# Patient Record
Sex: MALE | Race: WHITE | NOT HISPANIC OR LATINO | Employment: FULL TIME | URBAN - METROPOLITAN AREA
[De-identification: names, ages, dates, MRNs, and addresses within clinical notes are randomized per-mention and may not be internally consistent; named-entity substitution may affect disease eponyms.]

---

## 2017-03-01 RX ORDER — INDOMETHACIN 50 MG/1
50 CAPSULE ORAL 3 TIMES DAILY
Qty: 45 CAP | Refills: 1 | Status: SHIPPED | OUTPATIENT
Start: 2017-03-01 | End: 2017-05-23 | Stop reason: SDUPTHER

## 2017-03-02 NOTE — TELEPHONE ENCOUNTER
Requested Prescriptions     Pending Prescriptions Disp Refills   • indomethacin (INDOCIN) 50 MG Cap 45 Cap 1     Sig: Take 1 Cap by mouth 3 times a day. For gout, stop when symptoms decrease  Indications: Acute Joint Inflammation in Gout     DOE Finnegan.

## 2017-03-30 ENCOUNTER — OFFICE VISIT (OUTPATIENT)
Dept: MEDICAL GROUP | Facility: PHYSICIAN GROUP | Age: 52
End: 2017-03-30
Payer: COMMERCIAL

## 2017-03-30 ENCOUNTER — HOSPITAL ENCOUNTER (OUTPATIENT)
Dept: LAB | Facility: MEDICAL CENTER | Age: 52
End: 2017-03-30
Attending: NURSE PRACTITIONER
Payer: COMMERCIAL

## 2017-03-30 VITALS
TEMPERATURE: 97.9 F | HEIGHT: 72 IN | SYSTOLIC BLOOD PRESSURE: 136 MMHG | DIASTOLIC BLOOD PRESSURE: 88 MMHG | HEART RATE: 76 BPM | WEIGHT: 315 LBS | BODY MASS INDEX: 42.66 KG/M2 | OXYGEN SATURATION: 94 % | RESPIRATION RATE: 14 BRPM

## 2017-03-30 DIAGNOSIS — M1A.0720 IDIOPATHIC CHRONIC GOUT OF LEFT FOOT WITHOUT TOPHUS: ICD-10-CM

## 2017-03-30 DIAGNOSIS — E11.9 TYPE 2 DIABETES MELLITUS WITHOUT COMPLICATION, WITHOUT LONG-TERM CURRENT USE OF INSULIN (HCC): ICD-10-CM

## 2017-03-30 LAB
HBA1C MFR BLD: 7.9 % (ref ?–5.8)
INT CON NEG: NEGATIVE
INT CON POS: POSITIVE
URATE SERPL-MCNC: 4.4 MG/DL (ref 2.5–8.3)

## 2017-03-30 PROCEDURE — 99214 OFFICE O/P EST MOD 30 MIN: CPT | Performed by: NURSE PRACTITIONER

## 2017-03-30 PROCEDURE — 36415 COLL VENOUS BLD VENIPUNCTURE: CPT

## 2017-03-30 PROCEDURE — 84550 ASSAY OF BLOOD/URIC ACID: CPT

## 2017-03-30 PROCEDURE — 83036 HEMOGLOBIN GLYCOSYLATED A1C: CPT | Performed by: NURSE PRACTITIONER

## 2017-03-30 NOTE — ASSESSMENT & PLAN NOTE
Patient is currently working nights and this is making it difficult for him to manage sleep and food. He is taking his medications and reports almost 100% compliance. Today's A1c in the clinic is 7.9.

## 2017-03-30 NOTE — ASSESSMENT & PLAN NOTE
Having flares currently.  Taking indomethacin frequently.  Allopurinol daily.  No recent uric acid level.

## 2017-03-30 NOTE — ASSESSMENT & PLAN NOTE
Patient is down 4 pounds from our last visit in December. Patient is now working full-time nights and this is making it difficult to sleep and eat regular.

## 2017-03-30 NOTE — PROGRESS NOTES
Chief Complaint   Patient presents with   • Follow-Up   • Medication Refill       Subjective:   Moe Car is a 51 y.o. white male here today for evaluation and management of:    Chronic idiopathic gout of left foot  Having flares currently.  Taking indomethacin frequently.  Allopurinol daily.  No recent uric acid level.     Body mass index (BMI) of 45.0-49.9 in adult  Patient is down 4 pounds from our last visit in December. Patient is now working full-time nights and this is making it difficult to sleep and eat regular.    Type 2 diabetes mellitus without complication (CMS-HCC)  Patient is currently working nights and this is making it difficult for him to manage sleep and food. He is taking his medications and reports almost 100% compliance. Today's A1c in the clinic is 7.9.         Current medicines (including changes today)  Current Outpatient Prescriptions   Medication Sig Dispense Refill   • Empagliflozin 25 MG Tab Take 25 mg by mouth every day. 21 Tab 0   • Empagliflozin 25 MG Tab Take 25 mg by mouth every day. 90 Tab 2   • allopurinol (ZYLOPRIM) 300 MG Tab Take 1 Tab by mouth every day. 30 Tab 3   • Telmisartan-Amlodipine 40-10 MG Tab TAKE 1 TABLET EVERY DAY FOR BLOOD PRESSURE 90 Tab 3   • pravastatin (PRAVACHOL) 80 MG tablet TAKE 1 TAB BY MOUTH EVERY DAY. 90 Tab 3   • metformin (GLUCOPHAGE) 1000 MG tablet Take 1 Tab by mouth 2 times a day, with meals. 40 Tab 1   • hydrochlorothiazide (MICROZIDE) 12.5 MG capsule Take 1 Cap by mouth every day. 90 Cap 3   • metoprolol (TOPROL-XL) 200 MG XL tablet Take 1 Tab by mouth every day. TAKE 1 TABLET DAILY FOR HIGH BLOOD PRESSURE 90 Tab 3   • Cholecalciferol (VITAMIN D-3) 5000 UNITS TABS Take  by mouth.     • Coenzyme Q10 (CO Q 10 PO) Take  by mouth.     • indomethacin (INDOCIN) 50 MG Cap Take 1 Cap by mouth 3 times a day. For gout, stop when symptoms decrease  Indications: Acute Joint Inflammation in Gout 45 Cap 1   • pravastatin (PRAVACHOL) 80 MG tablet Take  1 Tab by mouth every day. 90 Tab 3   • Telmisartan-Amlodipine 40-10 MG Tab Take 1 Tab by mouth every day. TAKE 1 TABLET DAILY FOR HIGH BLOOD PRESSURE 90 Tab 3   • glucose blood (FREESTYLE INSULINX TEST) strip 1 Strip by Other route as needed. 100 Strip 3   • acyclovir (ZOVIRAX) 5 % OINT Apply  to affected area(s) every 3 hours. Apply after cleansing 60 g 3   • Blood Glucose Monitoring Suppl (FREESTYLE FREEDOM LITE) W/DEVICE KIT by Does not apply route. Test QD 1 Each prn   • glucose blood VI test strips (FREESTYLE LITE) strip by In Vitro route as needed. Test  Strip 3     No current facility-administered medications for this visit.     He  has a past medical history of Hypertension (10/21/2009); Hyperlipidemia (10/21/2009); and Obstructive sleep apnea (12/2/2009).    ROS as stated in history of present illness.  No chest pain, no shortness of breath, no abdominal pain       Objective:     Blood pressure 136/88, pulse 76, temperature 36.6 °C (97.9 °F), resp. rate 14, height 1.829 m (6'), weight 161.481 kg (356 lb), SpO2 94 %. Body mass index is 48.27 kg/(m^2). down 4 pounds from last visit.  Physical Exam:  Constitutional: Alert, no distress.  Skin: Warm, dry, good turgor,no cyanosis, no rashes in visible areas.  Eye: Equal, round and reactive, conjunctiva clear, lids normal.  Ears: No tenderness, no discharge.  External canals are without any significant edema or erythema.  Gross auditory acuity is intact.  Nose: symmetrical without tenderness, no discharge.  Mouth/Throat: lips without lesion.  Oropharynx clear.    Neck: Trachea midline, no masses, no obvious thyroid enlargement.. . Range of motion within normal limits.  Neuro: Cranial nerves 2-12 grossly intact.  No sensory deficit.  Respiratory: Unlabored respiratory effort, lungs clear to auscultation, no wheezes, no ronchi.  Cardiovascular: Normal S1, S2, no murmur, no edema.  Abdomen: Soft, morbidly obese non-tender, no masses, no guarding,  no  hepatosplenomegaly.  Monofilament testing with a 10 gram force: sensation intact: decreased on left  Visual Inspection: Feet without maceration, ulcers, fissures.  Pedal pulses: intact bilaterally  Psych: Alert and oriented x3, normal affect and mood and judgement.        Assessment and Plan:   The following treatment plan was discussed    1. Type 2 diabetes mellitus without complication, without long-term current use of insulin (CMS-HCC)  Chronic. Ongoing. Hemoglobin A1c today was 7.9 which is up from 6.8. We will add chart he has 25 mg by mouth daily. And recheck A1c in 3 months. We discussed continued efforts with his diet and exercise. He is down 4 pounds.  - POCT  A1C  - MICROALBUMIN CREAT RATIO URINE; Future  - COMP METABOLIC PANEL; Future  - LIPID PROFILE; Future  - HEMOGLOBIN A1C; Future    2. Idiopathic chronic gout of left foot without tophus  Chronic. Ongoing. Patient has had recent flares where he has had to take the I indomethacin daily. Patient states that over the last 6 weeks this has improved. We will check a uric acid level and monitor. Continue with allopurinol 300 mg daily. Discussed diet including red meat, nitrates, beer.  - URIC ACID; Future    3. Body mass index (BMI) of 45.0-49.9 in adult (CMS-HCC)  Chronic. Ongoing. Patient is down 4 pounds. We discussed the importance of keeping his gout under control in terms of helping him with walking and using a treadmill. Patient's erratic work schedule and working nights makes this a very difficult and challenging problem for him. We will recheck in 3 months.      Followup: Return in about 3 months (around 6/30/2017) for Diabetes, weight management.

## 2017-03-30 NOTE — MR AVS SNAPSHOT
Moe Car   3/30/2017 7:20 AM   Office Visit   MRN: [id number]    Department:  [facility]   Dept Phone:  [phone]    Description:  Male : 1965   Provider:  PATTI Finnegan           Reason for Visit     Follow-Up     Medication Refill           Allergies as of 3/30/2017     Allergen Noted Reactions    Crestor [Rosuvastatin Calcium] 10/21/2009       Muscle aches       You were diagnosed with     Type 2 diabetes mellitus without complication, without long-term current use of insulin (CMS-HCC)   [1293033]       Idiopathic chronic gout of left foot without tophus   [7735768]         Vital Signs     Blood Pressure Pulse Temperature Respirations Height Weight    136/88 mmHg 76 36.6 °C (97.9 °F) 14 1.829 m (6') 161.481 kg (356 lb)    Body Mass Index Oxygen Saturation Smoking Status             48.27 kg/m2 94% Never Smoker          Basic Information     Date Of Birth Sex Race Ethnicity Preferred Language    1965 Male White Non- English      Your appointments     2017  7:20 AM   Established Patient with PATTI Finnegan   [facility] ([place])    [address] [place] [address] [phone]           You will be receiving a confirmation call a few days before your appointment from our automated call confirmation system.              Problem List              ICD-10-CM Priority Class Noted - Resolved    Hyperlipidemia E78.5   10/21/2009 - Present    Obstructive sleep apnea G47.33   2009 - Present    Hyperglycemia R73.9   1/15/2010 - Present    Hypertension I10   3/2/2011 - Present    Vitamin D deficiency E55.9   2011 - Present    Body mass index (BMI) of 45.0-49.9 in adult (CMS-HCC) Z68.42   2012 - Present    Type 2 diabetes mellitus without complication (CMS-HCC) E11.9   10/13/2016 - Present    Chronic idiopathic gout of left foot M1A.0720   2016 - Present      Health Maintenance        Date Due Completion Dates    COLON CANCER SCREENING ANNUAL FIT 1965 ---    IMM HEP B VACCINE (1 of 3 - Primary Series) 1965 ---    DIABETES MONOFILAMENT / LE EXAM 1/7/1965 ---    RETINAL SCREENING 7/7/1982 ---    IMM PNEUMOCOCCAL 19-64 (ADULT) MEDIUM RISK SERIES (1 of 1 - PPSV23) 7/7/1983 ---    URINE ACR / MICROALBUMIN 5/27/2012 5/27/2011    A1C SCREENING 6/19/2017 12/19/2016, 10/8/2016, 5/6/2015, 11/18/2014, 3/8/2012, 10/14/2011, 7/29/2011, 3/17/2010    FASTING LIPID PROFILE 10/8/2017 10/8/2016, 12/17/2015, 5/6/2015, 11/18/2014, 9/10/2014, 3/11/2014, 6/27/2013, 5/2/2013, 7/2/2012, 3/8/2012, 10/14/2011, 7/29/2011, 5/27/2011, 3/2/2011, 3/17/2010, 10/8/2009, 3/26/2009, 11/7/2008, 7/3/2008, 5/15/2008, 6/7/2007, 3/29/2007, 7/19/2006    SERUM CREATININE 10/8/2017 10/8/2016, 12/17/2015, 5/6/2015, 11/18/2014, 9/10/2014, 3/11/2014, 6/27/2013, 5/2/2013, 7/2/2012, 3/8/2012, 10/14/2011, 7/29/2011, 5/27/2011, 3/2/2011, 3/17/2010, 10/8/2009, 3/26/2009, 11/7/2008, 7/3/2008, 5/15/2008, 6/7/2007, 3/29/2007, 7/19/2006    IMM DTaP/Tdap/Td Vaccine (2 - Td) 3/2/2021 3/2/2011            Results     POCT  A1C      Component    Glycohemoglobin    7.9    Internal Control Negative    Negative    Internal Control Positive    Positive                        Current Immunizations     Influenza Vaccine Pediatric 3/2/2011    Influenza Vaccine Quad Inj (Pf) 12/19/2016    Influenza Vaccine Quad Inj (Preserved) 11/18/2015, 11/18/2014    Tdap Vaccine 3/2/2011      Below and/or attached are the medications your provider expects you to take. Review all of your home medications and newly ordered medications with your provider and/or pharmacist. Follow medication instructions as directed by your provider and/or pharmacist. Please keep your medication list with you and share with your provider. Update the information when medications are discontinued, doses are changed, or new medications (including over-the-counter products) are added; and carry medication information at  all times in the event of emergency situations     Allergies:  CRESTOR - (reactions not documented)               Medications  Valid as of: March 30, 2017 -  7:50 AM    Generic Name Brand Name Tablet Size Instructions for use    Acyclovir (Ointment) ZOVIRAX 5 % Apply  to affected area(s) every 3 hours. Apply after cleansing        Allopurinol (Tab) ZYLOPRIM 300 MG Take 1 Tab by mouth every day.        Blood Glucose Monitoring Suppl (Kit) FREESTYLE FREEDOM LITE W/DEVICE by Does not apply route. Test QD        Cholecalciferol (Tab) Vitamin D-3 5000 UNITS Take  by mouth.        Coenzyme Q10   Take  by mouth.        Empagliflozin (Tab) Empagliflozin 25 MG Take 25 mg by mouth every day.        Empagliflozin (Tab) Empagliflozin 25 MG Take 25 mg by mouth every day.        Glucose Blood (Strip) glucose blood  by In Vitro route as needed. Test QD        Glucose Blood (Strip) glucose blood  1 Strip by Other route as needed.        HydroCHLOROthiazide (Cap) MICROZIDE 12.5 MG Take 1 Cap by mouth every day.        Indomethacin (Cap) INDOCIN 50 MG Take 1 Cap by mouth 3 times a day. For gout, stop when symptoms decrease  Indications: Acute Joint Inflammation in Gout        MetFORMIN HCl (Tab) GLUCOPHAGE 1000 MG Take 1 Tab by mouth 2 times a day, with meals.        Metoprolol Succinate (TABLET SR 24 HR) TOPROL- MG Take 1 Tab by mouth every day. TAKE 1 TABLET DAILY FOR HIGH BLOOD PRESSURE        Pravastatin Sodium (Tab) PRAVACHOL 80 MG Take 1 Tab by mouth every day.        Pravastatin Sodium (Tab) PRAVACHOL 80 MG TAKE 1 TAB BY MOUTH EVERY DAY.        Telmisartan-Amlodipine (Tab) Telmisartan-Amlodipine 40-10 MG Take 1 Tab by mouth every day. TAKE 1 TABLET DAILY FOR HIGH BLOOD PRESSURE        Telmisartan-Amlodipine (Tab) Telmisartan-Amlodipine 40-10 MG TAKE 1 TABLET EVERY DAY FOR BLOOD PRESSURE        .                 Medicines prescribed today were sent to:     [facility] - [facility] [place]  [address] [place] [address]  ROAD    [address] [facility] [address]    Phone: [phone] Fax: [phone]    Open 24 Hours?: No    [id number] [place] [place] [facility] [address] [place] [address] [address] [place] [address]    Phone: [phone] Fax: [phone]    Open 24 Hours?: No      Medication refill instructions:       If your prescription bottle indicates you have medication refills left, it is not necessary to call your provider’s office. Please contact your pharmacy and they will refill your medication.    If your prescription bottle indicates you do not have any refills left, you may request refills at any time through one of the following ways: The online [employer] system (except Urgent Care), by calling your provider’s office, or by asking your pharmacy to contact your provider’s office with a refill request. Medication refills are processed only during regular business hours and may not be available until the next business day. Your provider may request additional information or to have a follow-up visit with you prior to refilling your medication.   *Please Note: Medication refills are assigned a new Rx number when refilled electronically. Your pharmacy may indicate that no refills were authorized even though a new prescription for the same medication is available at the pharmacy. Please request the medicine by name with the pharmacy before contacting your provider for a refill.        Your To Do List     Future Labs/Procedures Complete By Expires    COMP METABOLIC PANEL  As directed 3/31/2018    HEMOGLOBIN A1C  As directed 3/31/2018    LIPID PROFILE  As directed 3/31/2018    MICROALBUMIN CREAT RATIO URINE  As directed 3/31/2018    URIC ACID  As directed 3/31/2018         [employer] Status: Patient Declined

## 2017-03-31 ENCOUNTER — TELEPHONE (OUTPATIENT)
Dept: MEDICAL GROUP | Facility: PHYSICIAN GROUP | Age: 52
End: 2017-03-31

## 2017-03-31 NOTE — TELEPHONE ENCOUNTER
----- Message from PATTI Finnegan sent at 3/30/2017  5:05 PM PDT -----  Please let Moe know that his Uric Acid level was in the normal range at 4.4.  Continue to take the Allopurinol daily.   Take care.  PATTI Finnegan

## 2017-04-06 RX ORDER — ALLOPURINOL 300 MG/1
300 TABLET ORAL DAILY
Qty: 90 TAB | Refills: 3 | Status: SHIPPED | OUTPATIENT
Start: 2017-04-06 | End: 2018-03-07 | Stop reason: SDUPTHER

## 2017-04-06 NOTE — TELEPHONE ENCOUNTER
Requested Prescriptions     Signed Prescriptions Disp Refills   • allopurinol (ZYLOPRIM) 300 MG Tab 90 Tab 3     Sig: Take 1 Tab by mouth every day.     Authorizing Provider: REGULO SLAUGHTER A.P.R.N.

## 2017-05-24 RX ORDER — INDOMETHACIN 50 MG/1
50 CAPSULE ORAL 3 TIMES DAILY
Qty: 270 CAP | Refills: 1 | Status: SHIPPED | OUTPATIENT
Start: 2017-05-24 | End: 2021-08-12 | Stop reason: SDUPTHER

## 2017-05-24 NOTE — TELEPHONE ENCOUNTER
Requested Prescriptions     Signed Prescriptions Disp Refills   • indomethacin (INDOCIN) 50 MG Cap 270 Cap 1     Sig: Take 1 Cap by mouth 3 times a day. For gout, stop when symptoms decrease  Indications: Acute Joint Inflammation in Gout     Authorizing Provider: REGULO SLAUGHTER A.P.R.N.

## 2017-07-18 ENCOUNTER — HOSPITAL ENCOUNTER (OUTPATIENT)
Dept: LAB | Facility: MEDICAL CENTER | Age: 52
End: 2017-07-18
Attending: NURSE PRACTITIONER
Payer: COMMERCIAL

## 2017-07-18 DIAGNOSIS — E11.9 TYPE 2 DIABETES MELLITUS WITHOUT COMPLICATION, WITHOUT LONG-TERM CURRENT USE OF INSULIN (HCC): ICD-10-CM

## 2017-07-18 LAB
ALBUMIN SERPL BCP-MCNC: 4.2 G/DL (ref 3.2–4.9)
ALBUMIN/GLOB SERPL: 1.5 G/DL
ALP SERPL-CCNC: 76 U/L (ref 30–99)
ALT SERPL-CCNC: 56 U/L (ref 2–50)
ANION GAP SERPL CALC-SCNC: 11 MMOL/L (ref 0–11.9)
AST SERPL-CCNC: 34 U/L (ref 12–45)
BILIRUB SERPL-MCNC: 0.8 MG/DL (ref 0.1–1.5)
BUN SERPL-MCNC: 14 MG/DL (ref 8–22)
CALCIUM SERPL-MCNC: 9.5 MG/DL (ref 8.5–10.5)
CHLORIDE SERPL-SCNC: 104 MMOL/L (ref 96–112)
CHOLEST SERPL-MCNC: 142 MG/DL (ref 100–199)
CO2 SERPL-SCNC: 23 MMOL/L (ref 20–33)
CREAT SERPL-MCNC: 0.77 MG/DL (ref 0.5–1.4)
EST. AVERAGE GLUCOSE BLD GHB EST-MCNC: 140 MG/DL
GFR SERPL CREATININE-BSD FRML MDRD: >60 ML/MIN/1.73 M 2
GLOBULIN SER CALC-MCNC: 2.8 G/DL (ref 1.9–3.5)
GLUCOSE SERPL-MCNC: 142 MG/DL (ref 65–99)
HBA1C MFR BLD: 6.5 % (ref 0–5.6)
HDLC SERPL-MCNC: 45 MG/DL
LDLC SERPL CALC-MCNC: 65 MG/DL
POTASSIUM SERPL-SCNC: 3.8 MMOL/L (ref 3.6–5.5)
PROT SERPL-MCNC: 7 G/DL (ref 6–8.2)
SODIUM SERPL-SCNC: 138 MMOL/L (ref 135–145)
TRIGL SERPL-MCNC: 159 MG/DL (ref 0–149)

## 2017-07-18 PROCEDURE — 83036 HEMOGLOBIN GLYCOSYLATED A1C: CPT

## 2017-07-18 PROCEDURE — 36415 COLL VENOUS BLD VENIPUNCTURE: CPT

## 2017-07-18 PROCEDURE — 80061 LIPID PANEL: CPT

## 2017-07-18 PROCEDURE — 80053 COMPREHEN METABOLIC PANEL: CPT

## 2017-07-19 ENCOUNTER — OFFICE VISIT (OUTPATIENT)
Dept: MEDICAL GROUP | Facility: PHYSICIAN GROUP | Age: 52
End: 2017-07-19
Payer: COMMERCIAL

## 2017-07-19 VITALS
WEIGHT: 315 LBS | DIASTOLIC BLOOD PRESSURE: 80 MMHG | OXYGEN SATURATION: 94 % | BODY MASS INDEX: 42.66 KG/M2 | HEIGHT: 72 IN | TEMPERATURE: 97.3 F | HEART RATE: 61 BPM | RESPIRATION RATE: 16 BRPM | SYSTOLIC BLOOD PRESSURE: 128 MMHG

## 2017-07-19 DIAGNOSIS — E11.9 TYPE 2 DIABETES MELLITUS WITHOUT COMPLICATION, WITHOUT LONG-TERM CURRENT USE OF INSULIN (HCC): ICD-10-CM

## 2017-07-19 DIAGNOSIS — I10 ESSENTIAL HYPERTENSION: ICD-10-CM

## 2017-07-19 DIAGNOSIS — E78.5 HYPERLIPIDEMIA, UNSPECIFIED HYPERLIPIDEMIA TYPE: ICD-10-CM

## 2017-07-19 DIAGNOSIS — Z12.11 SCREEN FOR COLON CANCER: ICD-10-CM

## 2017-07-19 DIAGNOSIS — M1A.0720 IDIOPATHIC CHRONIC GOUT OF LEFT FOOT WITHOUT TOPHUS: ICD-10-CM

## 2017-07-19 PROCEDURE — 99214 OFFICE O/P EST MOD 30 MIN: CPT | Performed by: NURSE PRACTITIONER

## 2017-07-19 NOTE — ASSESSMENT & PLAN NOTE
A1c 6.5 yesterday.  Is down 5 pounds from last visit. Taking metformin and Jardiance. States he feels much better since he started on the jardiance  Due for monofilament test today. Denies any changes to his feet lesions or sores numbness or tingling that is not associated with his gout symptoms.

## 2017-07-19 NOTE — MR AVS SNAPSHOT
Moe Car   2017 7:20 AM   Office Visit   MRN: [id number]    Department:  [facility]   Dept Phone:  [phone]    Description:  Male : 1965   Provider:  PATTI Finnegan           Reason for Visit     Follow-Up           Allergies as of 2017     Allergen Noted Reactions    Crestor [Rosuvastatin Calcium] 10/21/2009       Muscle aches       You were diagnosed with     Type 2 diabetes mellitus without complication, without long-term current use of insulin (CMS-HCC)   [5472068]       Screen for colon cancer   [910046]       Body mass index (BMI) of 45.0-49.9 in adult (CMS-HCC)   [283732]         Vital Signs     Blood Pressure Pulse Temperature Respirations Height Weight    128/80 mmHg 61 36.3 °C (97.3 °F) 16 1.829 m (6') 159.213 kg (351 lb)    Body Mass Index Oxygen Saturation Smoking Status             47.59 kg/m2 94% Never Smoker          Basic Information     Date Of Birth Sex Race Ethnicity Preferred Language    1965 Male White Non- English      Your appointments     2018  7:20 AM   Established Patient with PATTI Finnegan   [facility] ([address] [address] [phone]           You will be receiving a confirmation call a few days before your appointment from our automated call confirmation system.              Problem List              ICD-10-CM Priority Class Noted - Resolved    Hyperlipidemia E78.5   10/21/2009 - Present    Obstructive sleep apnea G47.33   2009 - Present    Hyperglycemia R73.9   1/15/2010 - Present    Hypertension I10   3/2/2011 - Present    Vitamin D deficiency E55.9   2011 - Present    Body mass index (BMI) of 45.0-49.9 in adult (CMS-HCC) Z68.42   2012 - Present    Type 2 diabetes mellitus without complication (CMS-HCC) E11.9   10/13/2016 - Present    Chronic idiopathic gout of left foot M1A.0720   2016 - Present      Health Maintenance        Date Due  Completion Dates    IMM HEP B VACCINE (1 of 3 - Primary Series) 1965 ---    DIABETES MONOFILAMENT / LE EXAM 1/7/1965 ---    RETINAL SCREENING 7/7/1982 ---    IMM PNEUMOCOCCAL 19-64 (ADULT) MEDIUM RISK SERIES (1 of 1 - PPSV23) 7/7/1983 ---    URINE ACR / MICROALBUMIN 5/27/2012 5/27/2011    COLON CANCER SCREENING ANNUAL FIT 7/7/2014 ---    COLONOSCOPY 7/7/2014 ---    IMM INFLUENZA (1) 9/1/2017 12/19/2016, 11/18/2015, 11/18/2014, 3/2/2011    A1C SCREENING 1/18/2018 7/18/2017, 3/30/2017, 12/19/2016, 10/8/2016, 5/6/2015, 11/18/2014, 3/8/2012, 10/14/2011, 7/29/2011, 3/17/2010    FASTING LIPID PROFILE 7/18/2018 7/18/2017, 10/8/2016, 12/17/2015, 5/6/2015, 11/18/2014, 9/10/2014, 3/11/2014, 6/27/2013, 5/2/2013, 7/2/2012, 3/8/2012, 10/14/2011, 7/29/2011, 5/27/2011, 3/2/2011, 3/17/2010, 10/8/2009, 3/26/2009, 11/7/2008, 7/3/2008, 5/15/2008, 6/7/2007, 3/29/2007, 7/19/2006    SERUM CREATININE 7/18/2018 7/18/2017, 10/8/2016, 12/17/2015, 5/6/2015, 11/18/2014, 9/10/2014, 3/11/2014, 6/27/2013, 5/2/2013, 7/2/2012, 3/8/2012, 10/14/2011, 7/29/2011, 5/27/2011, 3/2/2011, 3/17/2010, 10/8/2009, 3/26/2009, 11/7/2008, 7/3/2008, 5/15/2008, 6/7/2007, 3/29/2007, 7/19/2006    IMM DTaP/Tdap/Td Vaccine (2 - Td) 3/2/2021 3/2/2011            Current Immunizations     Influenza Vaccine Pediatric 3/2/2011    Influenza Vaccine Quad Inj (Pf) 12/19/2016    Influenza Vaccine Quad Inj (Preserved) 11/18/2015, 11/18/2014    Tdap Vaccine 3/2/2011      Below and/or attached are the medications your provider expects you to take. Review all of your home medications and newly ordered medications with your provider and/or pharmacist. Follow medication instructions as directed by your provider and/or pharmacist. Please keep your medication list with you and share with your provider. Update the information when medications are discontinued, doses are changed, or new medications (including over-the-counter products) are added; and carry medication information at  all times in the event of emergency situations     Allergies:  CRESTOR - (reactions not documented)               Medications  Valid as of: July 19, 2017 -  7:53 AM    Generic Name Brand Name Tablet Size Instructions for use    Acyclovir (Ointment) ZOVIRAX 5 % Apply  to affected area(s) every 3 hours. Apply after cleansing        Allopurinol (Tab) ZYLOPRIM 300 MG Take 1 Tab by mouth every day.        Blood Glucose Monitoring Suppl (Kit) FREESTYLE FREEDOM LITE W/DEVICE by Does not apply route. Test QD        Cholecalciferol (Tab) Vitamin D-3 5000 UNITS Take  by mouth.        Coenzyme Q10   Take  by mouth.        Empagliflozin (Tab) Empagliflozin 25 MG Take 25 mg by mouth every day.        Empagliflozin (Tab) Empagliflozin 25 MG Take 25 mg by mouth every day.        Glucose Blood (Strip) glucose blood  by In Vitro route as needed. Test QD        Glucose Blood (Strip) glucose blood  1 Strip by Other route as needed.        HydroCHLOROthiazide (Cap) MICROZIDE 12.5 MG Take 1 Cap by mouth every day.        Indomethacin (Cap) INDOCIN 50 MG Take 1 Cap by mouth 3 times a day. For gout, stop when symptoms decrease  Indications: Acute Joint Inflammation in Gout        MetFORMIN HCl (Tab) GLUCOPHAGE 1000 MG Take 1 Tab by mouth 2 times a day, with meals.        Metoprolol Succinate (TABLET SR 24 HR) TOPROL- MG Take 1 Tab by mouth every day. TAKE 1 TABLET DAILY FOR HIGH BLOOD PRESSURE        Pravastatin Sodium (Tab) PRAVACHOL 80 MG Take 1 Tab by mouth every day.        Pravastatin Sodium (Tab) PRAVACHOL 80 MG TAKE 1 TAB BY MOUTH EVERY DAY.        Telmisartan-Amlodipine (Tab) Telmisartan-Amlodipine 40-10 MG Take 1 Tab by mouth every day. TAKE 1 TABLET DAILY FOR HIGH BLOOD PRESSURE        Telmisartan-Amlodipine (Tab) Telmisartan-Amlodipine 40-10 MG TAKE 1 TABLET EVERY DAY FOR BLOOD PRESSURE        .                 Medicines prescribed today were sent to:     [facility] - [facility]  [address] [place] [address]  ROAD    [address] [facility] [address]    Phone: [phone] Fax: [phone]    Open 24 Hours?: No    [id number] - [place], [place] - [address] [place] [address] [place] [address]    Phone: [phone] Fax: [phone]    Open 24 Hours?: No      Medication refill instructions:       If your prescription bottle indicates you have medication refills left, it is not necessary to call your provider’s office. Please contact your pharmacy and they will refill your medication.    If your prescription bottle indicates you do not have any refills left, you may request refills at any time through one of the following ways: The online [employer] system (except Urgent Care), by calling your provider’s office, or by asking your pharmacy to contact your provider’s office with a refill request. Medication refills are processed only during regular business hours and may not be available until the next business day. Your provider may request additional information or to have a follow-up visit with you prior to refilling your medication.   *Please Note: Medication refills are assigned a new Rx number when refilled electronically. Your pharmacy may indicate that no refills were authorized even though a new prescription for the same medication is available at the pharmacy. Please request the medicine by name with the pharmacy before contacting your provider for a refill.        Your To Do List     Future Labs/Procedures Complete By Expires    OCCULT BLOOD FECES IMMUNOASSAY  As directed 7/20/2018         [employer] Status: Patient Declined

## 2017-07-19 NOTE — PROGRESS NOTES
Chief Complaint   Patient presents with   • Follow-Up       Subjective:   Israel Car is a 52 y.o. male here today for evaluation and management of:    Type 2 diabetes mellitus without complication (CMS-[facility])  A1c 6.5 yesterday.  Is down 5 pounds from last visit. Taking metformin and Jardiance. States he feels much better since he started on the jardiance  Due for monofilament test today. Denies any changes to his feet lesions or sores numbness or tingling that is not associated with his gout symptoms.    Body mass index (BMI) of 45.0-49.9 in adult  Down 5 pounds.  Watching diet. More movement.  Feeling better.    Chronic idiopathic gout of left foot   taking Alupent all daily. Taking occasional indomethacin for flares. Last flare 3 weeks ago ago.    Hypertension  Blood pressure 128/80 today. Denies chest pain shortness of breath headaches visual disturbances.    Hyperlipidemia  Last cholesterol was within normal limits. Triglycerides have decreased by over 100 points in the last 8 months.    Results for ISRAEL CAR (MRN [id number]) as of 7/19/2017 07:54   Ref. Range 7/18/2017 09:58   Cholesterol,Tot Latest Ref Range: 100-199 mg/dL 142   Triglycerides Latest Ref Range: 0-149 mg/dL 159 (H)   HDL Latest Ref Range: >=40 mg/dL 45   LDL Latest Ref Range: <100 mg/dL 65          Current medicines (including changes today)  Current Outpatient Prescriptions   Medication Sig Dispense Refill   • indomethacin (INDOCIN) 50 MG Cap Take 1 Cap by mouth 3 times a day. For gout, stop when symptoms decrease  Indications: Acute Joint Inflammation in Gout 270 Cap 1   • allopurinol (ZYLOPRIM) 300 MG Tab Take 1 Tab by mouth every day. 90 Tab 3   • Empagliflozin 25 MG Tab Take 25 mg by mouth every day. 90 Tab 2   • Telmisartan-Amlodipine 40-10 MG Tab TAKE 1 TABLET EVERY DAY FOR BLOOD PRESSURE 90 Tab 3   • pravastatin (PRAVACHOL) 80 MG tablet TAKE 1 TAB BY MOUTH EVERY DAY. 90 Tab 3   • metformin (GLUCOPHAGE) 1000 MG tablet Take 1  Tab by mouth 2 times a day, with meals. 40 Tab 1   • hydrochlorothiazide (MICROZIDE) 12.5 MG capsule Take 1 Cap by mouth every day. 90 Cap 3   • metoprolol (TOPROL-XL) 200 MG XL tablet Take 1 Tab by mouth every day. TAKE 1 TABLET DAILY FOR HIGH BLOOD PRESSURE 90 Tab 3   • Cholecalciferol (VITAMIN D-3) 5000 UNITS TABS Take  by mouth.     • Coenzyme Q10 (CO Q 10 PO) Take  by mouth.     • Empagliflozin 25 MG Tab Take 25 mg by mouth every day. 21 Tab 0   • pravastatin (PRAVACHOL) 80 MG tablet Take 1 Tab by mouth every day. 90 Tab 3   • Telmisartan-Amlodipine 40-10 MG Tab Take 1 Tab by mouth every day. TAKE 1 TABLET DAILY FOR HIGH BLOOD PRESSURE 90 Tab 3   • glucose blood (FREESTYLE INSULINX TEST) strip 1 Strip by Other route as needed. 100 Strip 3   • acyclovir (ZOVIRAX) 5 % OINT Apply  to affected area(s) every 3 hours. Apply after cleansing 60 g 3   • Blood Glucose Monitoring Suppl (FREESTYLE FREEDOM LITE) W/DEVICE KIT by Does not apply route. Test QD 1 Each prn   • glucose blood VI test strips (FREESTYLE LITE) strip by In Vitro route as needed. Test  Strip 3     No current facility-administered medications for this visit.     He  has a past medical history of Hypertension (10/21/2009); Hyperlipidemia (10/21/2009); and Obstructive sleep apnea (12/2/2009).    ROS as stated in history of present illness.  No chest pain, no shortness of breath, no abdominal pain       Objective:     Blood pressure 128/80, pulse 61, temperature 36.3 °C (97.3 °F), resp. rate 16, height 1.829 m (6'), weight 159.213 kg (351 lb), SpO2 94 %. Body mass index is 47.59 kg/(m^2). Down 5 pounds.  Physical Exam:  Constitutional: Alert, no distress.  Skin: Warm, dry, good turgor,no cyanosis, no rashes in visible areas.  Eye: Equal, round and reactive, conjunctiva clear, lids normal.  Ears: No tenderness, no discharge.  External canals are without any significant edema or erythema.  .  Gross auditory acuity is intact.  Nose: symmetrical without  tenderness, no discharge.  Mouth/Throat: lips without lesion.  Oropharynx clear.    Neck: Trachea midline, no masses, no obvious thyroid enlargement.. No cervical or supraclavicular lymphadenopathy. Range of motion within normal limits.  Neuro: Cranial nerves 2-12 grossly intact.  No sensory deficit.  Respiratory: Unlabored respiratory effort, lungs clear to auscultation, no wheezes, no ronchi.  Cardiovascular: Normal S1, S2, no murmur, no edema.  Abdomen: Soft, non-tender, no masses, no guarding,  no hepatosplenomegaly.  Monofilament testing with a 10 gram force: sensation intact: intact bilaterally  Visual Inspection: Feet without maceration, ulcers, fissures.  Pedal pulses: intact bilaterally    Psych: Alert and oriented x3, normal affect and mood and judgement.        Assessment and Plan:   The following treatment plan was discussed    1. Type 2 diabetes mellitus without complication, without long-term current use of insulin (CMS-HCC)  Chronic. Improving. A1c down to 6.5. Weight is down 5 pounds. Continue with metformin and Jardiance. Continue to watch diet continue to exercise. Follow-up in 6 months. Reviewed with patient the need for a retinal exam. Patient will make an appointment.  - Diabetic Monofilament LE Exam    2. Screen for colon cancer  Overdue for colon cancer screening. That test provided. We'll monitor results.  - OCCULT BLOOD FECES IMMUNOASSAY; Future    3. Body mass index (BMI) of 45.0-49.9 in adult (CMS-HCC)  Chronic. Improving. Patient down 5 pounds since our last visit. Continue with diet and exercise. Repeat check weight in 6 months.    4. Idiopathic chronic gout of left foot without tophus  Chronic. Stable. Continue with Alupent when necessary I'll and indomethacin when necessary. Continue to watch dietary issues especially red meat, PA or, nitrates.    5. Essential hypertension  Chronic. Stable. Blood pressure:. Continue with amlodipine hydrochlorothiazide and metoprolol. Monitor and  follow.    6. Hyperlipidemia, unspecified hyperlipidemia type  Chronic. Improving. Continue pravastatin. Continue diet and exercise modifications. Recheck in 6 months.      Followup: Return in about 6 months (around 1/19/2018) for Diabetes, weight management.

## 2017-07-19 NOTE — ASSESSMENT & PLAN NOTE
Last cholesterol was within normal limits. Triglycerides have decreased by over 100 points in the last 8 months.    Results for ISRAEL CALDERA (MRN [id number]) as of 7/19/2017 07:54   Ref. Range 7/18/2017 09:58   Cholesterol,Tot Latest Ref Range: 100-199 mg/dL 142   Triglycerides Latest Ref Range: 0-149 mg/dL 159 (H)   HDL Latest Ref Range: >=40 mg/dL 45   LDL Latest Ref Range: <100 mg/dL 65

## 2017-09-05 DIAGNOSIS — I10 ESSENTIAL HYPERTENSION: ICD-10-CM

## 2017-09-05 RX ORDER — TELMISARTAN AND AMLODIPINE 10; 40 MG/1; MG/1
1 TABLET ORAL DAILY
Qty: 90 TAB | Refills: 2 | Status: SHIPPED | OUTPATIENT
Start: 2017-09-05 | End: 2018-01-22

## 2017-09-05 RX ORDER — METOPROLOL SUCCINATE 200 MG/1
200 TABLET, EXTENDED RELEASE ORAL DAILY
Qty: 90 TAB | Refills: 2 | Status: SHIPPED | OUTPATIENT
Start: 2017-09-05 | End: 2018-06-14 | Stop reason: SDUPTHER

## 2017-09-05 RX ORDER — HYDROCHLOROTHIAZIDE 12.5 MG/1
12.5 CAPSULE, GELATIN COATED ORAL DAILY
Qty: 90 CAP | Refills: 2 | Status: SHIPPED | OUTPATIENT
Start: 2017-09-05 | End: 2018-06-14 | Stop reason: SDUPTHER

## 2017-09-05 RX ORDER — PRAVASTATIN SODIUM 80 MG/1
80 TABLET ORAL DAILY
Qty: 90 TAB | Refills: 2 | Status: SHIPPED | OUTPATIENT
Start: 2017-09-05 | End: 2018-01-22

## 2017-09-05 NOTE — TELEPHONE ENCOUNTER
Requested Prescriptions     Signed Prescriptions Disp Refills   • hydrochlorothiazide (MICROZIDE) 12.5 MG capsule 90 Cap 2     Sig: Take 1 Cap by mouth every day.     Authorizing Provider: REGULO SLAUGHTER   • metformin (GLUCOPHAGE) 1000 MG tablet 180 Tab 2     Sig: Take 1 Tab by mouth 2 times a day, with meals.     Authorizing Provider: REGULO SLAUGHTER   • metoprolol (TOPROL-XL) 200 MG XL tablet 90 Tab 2     Sig: Take 1 Tab by mouth every day. TAKE 1 TABLET DAILY FOR HIGH BLOOD PRESSURE     Authorizing Provider: REGULO SLAUGHTER   • pravastatin (PRAVACHOL) 80 MG tablet 90 Tab 2     Sig: Take 1 Tab by mouth every day.     Authorizing Provider: REGULO SLAUGHTER   • Telmisartan-Amlodipine 40-10 MG Tab 90 Tab 2     Sig: Take 1 Tab by mouth every day. TAKE 1 TABLET DAILY FOR HIGH BLOOD PRESSURE     Authorizing Provider: REGULO SLAUGHTER A.P.R.N.

## 2017-11-28 RX ORDER — EMPAGLIFLOZIN 25 MG/1
TABLET, FILM COATED ORAL
Qty: 90 TAB | Refills: 3 | Status: SHIPPED | OUTPATIENT
Start: 2017-11-28 | End: 2018-11-26 | Stop reason: SDUPTHER

## 2017-11-28 NOTE — TELEPHONE ENCOUNTER
Requested Prescriptions     Signed Prescriptions Disp Refills   • JARDIANCE 25 MG Tab 90 Tab 3     Sig: TAKE 1 TABLET DAILY     Authorizing Provider: REGULO SLAUGHTER A.P.R.N.

## 2018-01-22 ENCOUNTER — HOSPITAL ENCOUNTER (OUTPATIENT)
Dept: LAB | Facility: MEDICAL CENTER | Age: 53
End: 2018-01-22
Attending: NURSE PRACTITIONER
Payer: COMMERCIAL

## 2018-01-22 ENCOUNTER — OFFICE VISIT (OUTPATIENT)
Dept: MEDICAL GROUP | Facility: PHYSICIAN GROUP | Age: 53
End: 2018-01-22
Payer: COMMERCIAL

## 2018-01-22 VITALS
BODY MASS INDEX: 42.66 KG/M2 | HEIGHT: 72 IN | WEIGHT: 315 LBS | SYSTOLIC BLOOD PRESSURE: 122 MMHG | DIASTOLIC BLOOD PRESSURE: 84 MMHG | RESPIRATION RATE: 16 BRPM | HEART RATE: 66 BPM | TEMPERATURE: 97.5 F | OXYGEN SATURATION: 92 %

## 2018-01-22 DIAGNOSIS — E11.9 TYPE 2 DIABETES MELLITUS WITHOUT COMPLICATION, WITHOUT LONG-TERM CURRENT USE OF INSULIN (HCC): ICD-10-CM

## 2018-01-22 DIAGNOSIS — G47.33 OBSTRUCTIVE SLEEP APNEA: ICD-10-CM

## 2018-01-22 DIAGNOSIS — Z12.11 SCREEN FOR COLON CANCER: ICD-10-CM

## 2018-01-22 DIAGNOSIS — I10 ESSENTIAL HYPERTENSION: ICD-10-CM

## 2018-01-22 DIAGNOSIS — M1A.0720 IDIOPATHIC CHRONIC GOUT OF LEFT FOOT WITHOUT TOPHUS: ICD-10-CM

## 2018-01-22 DIAGNOSIS — E78.5 HYPERLIPIDEMIA, UNSPECIFIED HYPERLIPIDEMIA TYPE: ICD-10-CM

## 2018-01-22 LAB
ALBUMIN SERPL BCP-MCNC: 4.9 G/DL (ref 3.2–4.9)
ALBUMIN/GLOB SERPL: 1.8 G/DL
ALP SERPL-CCNC: 74 U/L (ref 30–99)
ALT SERPL-CCNC: 69 U/L (ref 2–50)
ANION GAP SERPL CALC-SCNC: 8 MMOL/L (ref 0–11.9)
AST SERPL-CCNC: 35 U/L (ref 12–45)
BILIRUB SERPL-MCNC: 0.7 MG/DL (ref 0.1–1.5)
BUN SERPL-MCNC: 11 MG/DL (ref 8–22)
CALCIUM SERPL-MCNC: 9.9 MG/DL (ref 8.5–10.5)
CHLORIDE SERPL-SCNC: 102 MMOL/L (ref 96–112)
CHOLEST SERPL-MCNC: 188 MG/DL (ref 100–199)
CO2 SERPL-SCNC: 29 MMOL/L (ref 20–33)
CREAT SERPL-MCNC: 0.81 MG/DL (ref 0.5–1.4)
CREAT UR-MCNC: 78.2 MG/DL
EST. AVERAGE GLUCOSE BLD GHB EST-MCNC: 151 MG/DL
GLOBULIN SER CALC-MCNC: 2.8 G/DL (ref 1.9–3.5)
GLUCOSE SERPL-MCNC: 117 MG/DL (ref 65–99)
HBA1C MFR BLD: 6.9 % (ref 0–5.6)
HDLC SERPL-MCNC: 42 MG/DL
LDLC SERPL CALC-MCNC: 93 MG/DL
MICROALBUMIN UR-MCNC: 1.1 MG/DL
MICROALBUMIN/CREAT UR: 14 MG/G (ref 0–30)
POTASSIUM SERPL-SCNC: 4.2 MMOL/L (ref 3.6–5.5)
PROT SERPL-MCNC: 7.7 G/DL (ref 6–8.2)
SODIUM SERPL-SCNC: 139 MMOL/L (ref 135–145)
TRIGL SERPL-MCNC: 263 MG/DL (ref 0–149)

## 2018-01-22 PROCEDURE — 83036 HEMOGLOBIN GLYCOSYLATED A1C: CPT

## 2018-01-22 PROCEDURE — 99214 OFFICE O/P EST MOD 30 MIN: CPT | Performed by: NURSE PRACTITIONER

## 2018-01-22 PROCEDURE — 80053 COMPREHEN METABOLIC PANEL: CPT

## 2018-01-22 PROCEDURE — 82043 UR ALBUMIN QUANTITATIVE: CPT

## 2018-01-22 PROCEDURE — 92250 FUNDUS PHOTOGRAPHY W/I&R: CPT | Mod: TC | Performed by: NURSE PRACTITIONER

## 2018-01-22 PROCEDURE — 82570 ASSAY OF URINE CREATININE: CPT

## 2018-01-22 PROCEDURE — 36415 COLL VENOUS BLD VENIPUNCTURE: CPT

## 2018-01-22 PROCEDURE — 80061 LIPID PANEL: CPT

## 2018-01-22 ASSESSMENT — PAIN SCALES - GENERAL: PAINLEVEL: NO PAIN

## 2018-01-22 ASSESSMENT — PATIENT HEALTH QUESTIONNAIRE - PHQ9: CLINICAL INTERPRETATION OF PHQ2 SCORE: 0

## 2018-01-22 NOTE — ASSESSMENT & PLAN NOTE
Needs referral to pulmonology for new CPAP.  States his work place is frustrating because there is some retatlitation around pulmonolgy.

## 2018-01-22 NOTE — PROGRESS NOTES
Chief Complaint   Patient presents with   • Diabetes Mellitus     6 month follow up       Subjective:   Moe Car is a 52 y.o. white male here today for evaluation and management of:    Body mass index (BMI) of 45.0-49.9 in adult  Weight is down 10 pounds this visit.   Watching what he is eating.     Hypertension  /84 . No chest pain, shortness of breath reported.     Chronic idiopathic gout of left foot  No recent flare ups.  Taking allopurinol daily without flares.     Type 2 diabetes mellitus without complication (CMS-[facility])  Taking Jardiance, metformin, daily    Obstructive sleep apnea  Needs referral to pulmonology for new CPAP.  States his work place is frustrating because there is some retatlitation around pulmonolgy.     Hyperlipidemia  Taking pravastatin daily.          Current medicines (including changes today)  Current Outpatient Prescriptions   Medication Sig Dispense Refill   • JARDIANCE 25 MG Tab TAKE 1 TABLET DAILY 90 Tab 3   • hydrochlorothiazide (MICROZIDE) 12.5 MG capsule Take 1 Cap by mouth every day. 90 Cap 2   • metformin (GLUCOPHAGE) 1000 MG tablet Take 1 Tab by mouth 2 times a day, with meals. 180 Tab 2   • metoprolol (TOPROL-XL) 200 MG XL tablet Take 1 Tab by mouth every day. TAKE 1 TABLET DAILY FOR HIGH BLOOD PRESSURE 90 Tab 2   • indomethacin (INDOCIN) 50 MG Cap Take 1 Cap by mouth 3 times a day. For gout, stop when symptoms decrease  Indications: Acute Joint Inflammation in Gout 270 Cap 1   • allopurinol (ZYLOPRIM) 300 MG Tab Take 1 Tab by mouth every day. 90 Tab 3   • Telmisartan-Amlodipine 40-10 MG Tab TAKE 1 TABLET EVERY DAY FOR BLOOD PRESSURE 90 Tab 3   • pravastatin (PRAVACHOL) 80 MG tablet TAKE 1 TAB BY MOUTH EVERY DAY. 90 Tab 3   • Cholecalciferol (VITAMIN D-3) 5000 UNITS TABS Take  by mouth.     • Coenzyme Q10 (CO Q 10 PO) Take  by mouth.     • glucose blood (FREESTYLE INSULINX TEST) strip 1 Strip by Other route as needed. 100 Strip 3   • Blood Glucose Monitoring  Suppl (FREESTYLE FREEDOM LITE) W/DEVICE KIT by Does not apply route. Test QD 1 Each prn   • glucose blood VI test strips (FREESTYLE LITE) strip by In Vitro route as needed. Test  Strip 3   • acyclovir (ZOVIRAX) 5 % OINT Apply  to affected area(s) every 3 hours. Apply after cleansing 60 g 3     No current facility-administered medications for this visit.      He  has a past medical history of Hyperlipidemia (10/21/2009); Hypertension (10/21/2009); and Obstructive sleep apnea (12/2/2009).    ROS as stated in hpi  No chest pain, no shortness of breath, no abdominal pain       Objective:     Blood pressure 122/84, pulse 66, temperature 36.4 °C (97.5 °F), resp. rate 16, height 1.829 m (6'), weight (!) 154.7 kg (341 lb), SpO2 92 %. Body mass index is 46.25 kg/m². down 10 pounds since last visit.   Physical Exam:  Constitutional: Alert, no distress.  Skin: Warm, dry, good turgor,no cyanosis, no rashes in visible areas.  Eye: Equal, round and reactive, conjunctiva clear, lids normal.  Ears: No tenderness, no discharge.  External canals are without any significant edema or erythema.    Gross auditory acuity is intact.  Nose: symmetrical without tenderness, no discharge.  Mouth/Throat: lips without lesion.  Oropharynx clear.   Neck: Trachea midline, no masses, no obvious thyroid enlargement.. No cervical or supraclavicular lymphadenopathy. Range of motion within normal limits.  Neuro: Cranial nerves 2-12 grossly intact.  No sensory deficit.  Respiratory: Unlabored respiratory effort, lungs clear to auscultation, no wheezes, no ronchi.  Cardiovascular: Normal S1, S2, no murmur, no edema.  Abdomen: Soft morbidly obese, non-tender, no masses, no guarding,  no hepatosplenomegaly.  Psych: Alert and oriented x3, normal affect and mood and judgement.        Assessment and Plan:   The following treatment plan was discussed    1. Body mass index (BMI) of 45.0-49.9 in adult (CMS-HCC)  Chronic issue, unstable.  Referral to [facility] for  weight management.  Monitor and follow  - REFERRAL TO [facility] (HIP) Services Requested: Medical Weight Management Program; Reason for Referral? BMI>30; Reason for Visit: Overweight/Obesity; Other: diabetic    2. Essential hypertension  Chronic ongoing ,stable.  Continue with medication plan.  Monitor BP 2-3 times/week.  Diet and exercise.     3. Type 2 diabetes mellitus without complication, without long-term current use of insulin (CMS-HCC)  Chronic ongoing issue. Patient is not checking blood sugars regularly. Last A1c was 6.5. He is due for A1c today. We'll monitor and follow his A1c also referral to health improvement programs for weight loss. Discussed diet and exercise. Monitor and follow.  - REFERRAL TO [facility] (HIP) Services Requested: Medical Weight Management Program; Reason for Referral? BMI>30; Reason for Visit: Overweight/Obesity; Other: diabetic  - HEMOGLOBIN A1C; Future  - MICROALBUMIN CREAT RATIO URINE; Future  - LIPID PROFILE; Future  - COMP METABOLIC PANEL; Future  - POCT Retinal Eye Exam    4. Screen for colon cancer  Patient overdue for colon cancer screening. Referral placed. Monitor.  - REFERRAL TO GASTROENTEROLOGY    5. Idiopathic chronic gout of left foot without tophus  Chronic ongoing issue. Improved with the alley Purinol. Continue daily use. Discussed diet.    6. Obstructive sleep apnea  Chronic. Unstable. Patient is not using CPAP. Referral to pulmonology. Discussed at length the risks of not treating obstructive sleep apnea. Patient verbalizes understanding. Monitor and follow.  - REFERRAL TO PULMONOLOGY    7. Hyperlipidemia, unspecified hyperlipidemia type  Chronic ongoing issue. Last cholesterol was within normal limits. Continue pravastatin daily. Monitor and follow.      Followup: Return in about 6 months (around 7/22/2018) for Diabetes, weight management.

## 2018-01-23 ENCOUNTER — TELEPHONE (OUTPATIENT)
Dept: MEDICAL GROUP | Facility: PHYSICIAN GROUP | Age: 53
End: 2018-01-23

## 2018-01-23 NOTE — TELEPHONE ENCOUNTER
----- Message from PATTI Finnegan sent at 1/23/2018  7:11 AM PST -----  Please let patient know that labs are back.  Thanks for getting these done so promptly.  A1c was 6.9 up a bit from 6 months ago (6.5).  Kidney function looks good.  Cholesterol good at 188.  Triglycerides elevated to 263 which is up from 159 last time.  Those are those sweets/treats, etc.  I know that the weight management program will be a good support for you.  Have a great day  PATTI Finnegan

## 2018-01-23 NOTE — LETTER
January 23, 2018         Moe Joey Car  [address] [place] [address]        Dear Moe:      Below are the results from your recent visit:    Please let patient know that labs are back.  Thanks for getting these done so promptly.   A1c was 6.9 up a bit from 6 months ago (6.5).  Kidney function looks good.  Cholesterol good at 188.  Triglycerides elevated to 263 which is up from 159 last time.  Those are those sweets/treats, etc.   I know that the weight management program will be a good support for you.  Have a great day   PATTI Finnegan     Resulted Orders   HEMOGLOBIN A1C   Result Value Ref Range    Glycohemoglobin 6.9 (H) 0.0 - 5.6 %      Comment:      Increased risk for diabetes:  5.7 -6.4%  Diabetes:  >6.4%  Glycemic control for adults with diabetes:  <7.0%  The above interpretations are per ADA guidelines.  Diagnosis  of diabetes mellitus on the basis of elevated Hemoglobin A1c  should be confirmed by repeating the Hb A1c test.      Est Avg Glucose 151 mg/dL      Comment:      The eAG calculation is based on the A1c-Derived Daily Glucose  (ADAG) study.  See the ADA's website for additional information.      Narrative    Request patient fasting?->Yes   MICROALBUMIN CREAT RATIO URINE   Result Value Ref Range    Creatinine, Urine 78.20 mg/dL    Microalbumin, Urine Random 1.1 mg/dL    Micro Alb Creat Ratio 14 0 - 30 mg/g   LIPID PROFILE   Result Value Ref Range    Cholesterol,Tot 188 100 - 199 mg/dL    Triglycerides 263 (H) 0 - 149 mg/dL    HDL 42 >=40 mg/dL    LDL 93 <100 mg/dL    Narrative    Request patient fasting?->Yes   COMP METABOLIC PANEL   Result Value Ref Range    Sodium 139 135 - 145 mmol/L    Potassium 4.2 3.6 - 5.5 mmol/L    Chloride 102 96 - 112 mmol/L    Co2 29 20 - 33 mmol/L    Anion Gap 8.0 0.0 - 11.9    Glucose 117 (H) 65 - 99 mg/dL    Bun 11 8 - 22 mg/dL    Creatinine 0.81 0.50 - 1.40 mg/dL    Calcium 9.9 8.5 - 10.5 mg/dL    AST(SGOT) 35 12 - 45 U/L    ALT(SGPT) 69 (H) 2 -  50 U/L    Alkaline Phosphatase 74 30 - 99 U/L    Total Bilirubin 0.7 0.1 - 1.5 mg/dL    Albumin 4.9 3.2 - 4.9 g/dL    Total Protein 7.7 6.0 - 8.2 g/dL    Globulin 2.8 1.9 - 3.5 g/dL    A-G Ratio 1.8 g/dL    Narrative    Request patient fasting?->Yes   ESTIMATED GFR   Result Value Ref Range    GFR If African American >60 >60 mL/min/1.73 m 2    GFR If Non African American >60 >60 mL/min/1.73 m 2    Narrative    Request patient fasting?->Yes     If you have any questions or concerns, please don't hesitate to call.    Electronically Signed

## 2018-01-31 LAB — RETINAL SCREEN: NEGATIVE

## 2018-03-07 RX ORDER — ALLOPURINOL 300 MG/1
TABLET ORAL
Qty: 90 TAB | Refills: 3 | Status: SHIPPED | OUTPATIENT
Start: 2018-03-07 | End: 2019-03-04 | Stop reason: SDUPTHER

## 2018-03-08 ENCOUNTER — OFFICE VISIT (OUTPATIENT)
Dept: HEALTH INFORMATION MANAGEMENT | Facility: MEDICAL CENTER | Age: 53
End: 2018-03-08
Payer: COMMERCIAL

## 2018-03-08 VITALS
SYSTOLIC BLOOD PRESSURE: 138 MMHG | TEMPERATURE: 98.3 F | HEART RATE: 65 BPM | WEIGHT: 315 LBS | DIASTOLIC BLOOD PRESSURE: 84 MMHG | BODY MASS INDEX: 42.66 KG/M2 | OXYGEN SATURATION: 94 % | HEIGHT: 72 IN

## 2018-03-08 VITALS — BODY MASS INDEX: 42.66 KG/M2 | HEIGHT: 72 IN | WEIGHT: 315 LBS

## 2018-03-08 DIAGNOSIS — E11.9 TYPE 2 DIABETES MELLITUS WITHOUT COMPLICATION, WITHOUT LONG-TERM CURRENT USE OF INSULIN (HCC): ICD-10-CM

## 2018-03-08 DIAGNOSIS — E78.5 HYPERLIPIDEMIA, UNSPECIFIED HYPERLIPIDEMIA TYPE: ICD-10-CM

## 2018-03-08 DIAGNOSIS — E55.9 VITAMIN D DEFICIENCY: ICD-10-CM

## 2018-03-08 DIAGNOSIS — G47.33 OBSTRUCTIVE SLEEP APNEA: ICD-10-CM

## 2018-03-08 PROCEDURE — 93000 ELECTROCARDIOGRAM COMPLETE: CPT | Performed by: INTERNAL MEDICINE

## 2018-03-08 PROCEDURE — 99205 OFFICE O/P NEW HI 60 MIN: CPT | Mod: 25 | Performed by: INTERNAL MEDICINE

## 2018-03-08 PROCEDURE — 97802 MEDICAL NUTRITION INDIV IN: CPT | Performed by: DIETITIAN, REGISTERED

## 2018-03-08 RX ORDER — ACETAMINOPHEN 500 MG
500-1000 TABLET ORAL EVERY 6 HOURS PRN
COMMUNITY
End: 2024-01-24

## 2018-03-08 NOTE — PATIENT INSTRUCTIONS
Reduce carbs and increase protein as directed  Avoid sweetened drinks  Nguyen MOJICA in am  64+ oz water per day  Cut milk intake by 50%, try for 1% instead of full milk  Plate method for portion size

## 2018-03-08 NOTE — PROGRESS NOTES
"3/8/2018   Referring Provider: PATTI Finnegan       Time in/out: 11:45-12:12    Anthropometrics/Objective  Today's weight: 336.4#  Height: 6'0\"  BMI: 45.6   Stated Goal Weight: 210#  See comprehensive patient history form for further information     Subjective:  Pt is here today for the initial screening visit for the medical weight management program.   - work schedule varies, on-call, works for [employer] system  - [place]  - drinks a gallon of milk a day, has tried to cut down in the past with smaller portions  - struggles with portion control  - family and events revolve around food  - skips meals sometimes because if he eats too much he's not hungry or will want to go back to sleep  - has started eliminating some carbohydrates, leaner protein and filling up on cabbage, has lost he thinks ~9#  - enjoys cooking    See Medical Questionnaire for more detailed diet history     Nutrition Diagnosis (PES Statement)  · Obesity related to excessive energy intake and inadequate energy expenditure as evidenced by BMI 45.6     Client history:  Condition(s) associated with a diagnosis or treatment (specify) including gout, type 2 diabetes mellitus,  hypertension, obstructive sleep apnea, hyperlipidemia.    Biochemical data, medical test and procedures  Lab Results   Component Value Date/Time    HBA1C 6.9 (H) 01/22/2018 09:28 AM   @  Lab Results   Component Value Date/Time    POCGLUCOSE 125 01/15/2010 08:03 AM     Lab Results   Component Value Date/Time    CHOLSTRLTOT 188 01/22/2018 09:28 AM    LDL 93 01/22/2018 09:28 AM    HDL 42 01/22/2018 09:28 AM    TRIGLYCERIDE 263 (H) 01/22/2018 09:28 AM         Nutrition Intervention  Nutrition Prescription  Recommended Daily Kcals: 2624 Kcal based on REE      Meal Plan Recommendation   Recommend 1 meal replacement (2 PB bars) with 2 grocery meals and 2 snacks per day    Comprehensive Nutrition Education Instruction or training leading to " in-depth nutrition related knowledge about:  Benefits to following meal plan, Meal timing and spacing, Menu Planning, Portion control, Increasing/Decreasing PO intake and Handouts provided regarding topics discussed     Monitoring & Evaluation Plan    Behavioral-Environmental:  Physical activity: Not discussed on today's visit     Food / Nutrient Intake:  Food intake: Follow meal plan as discussed (see above). Avoid concentrated sweets and processed carbs.  Follow plate guide for planning meals: 5-6 ounces protein, 1/2 plate non-starchy vegetables, 1 fist of carbohydrate per meal. Choose 1-2 oz protein + NS vegetable for snacks. Monitor portion of nuts.   Fluid intake: Consume at least 64 oz water per day. Avoid all unsweetened beverages. Limit coffee to 1 cup a day (ideally no cream or sugar). Avoid alcohol. Cut milk intake by 50% (1/2 gallon a day to start).     Physical Signs / Symptoms:  HbA1c profiles within ADA guidelines, Lipid profiles WNL and Weight change: 5% wt loss at one month after start (pt goal weight is 210 lb)    Assessment Notes:  Moe struggles most with portion control. He has already started making some changes to his meal patterns by limiting his bread and pasta intake and choosing leaner meats and more non-starchy vegetables. He consumes a gallon of milk a day which he is going to start by limiting to 1/2 gallon. He plans to focus on changing his idea of how plate should look by rearranging the carbohydrate as a side and not the main meal. He is going to use 2 of the [employer] peanut butter bars for a breakfast meal replacement and work on adjusting some of his snack portions. Pt was accompanied by his wife today who is also seeing Dr. Adkins for medical weight management. Pt may benefit from keeping a written food journal on his next visit. We will start with small goals and continue to work up from there.     Follow up 2 weeks

## 2018-03-08 NOTE — PROGRESS NOTES
Bariatric Medicine H&P  Chief Complaint   Patient presents with   • Hyperglycemia       Referred by: Pearl BUCIO    History of Present Illness:   Moe Car is a 52 y.o.  male who presents for weight management and to help address co-morbidities related to overweight, including gout, type 2 diabetes mellitus, vitamin D deficiency, hypertension, obstructive sleep apnea, hyperlipidemia.    The patient is having trouble keeping up with his grandkids. He notices on pictures that he has become very obese. He wants to improve his energy, health. Over last 10 years his weight has increased significantly. 5 weeks ago, when his doctor put in the referral to come here, he started eating better, including a lot of steamed vegetables and less fast food. He has lost 9 pounds. [place] so was used to large portion sizes. He works on a railroad, driving locomotive, eats a lot of fast food typically at work, but has cut active recently as above.    Blood glucose is uncontrolled on current Jardiance, metformin. Blood glucose is running about 120s to 130s before breakfast which he checks once a week. On vitamin D repletion. Fair blood pressure control on current antihypertensives including metoprolol and hydrochlorothiazide. On allopurinol for gout. Not using CPAP for WILBERT.    Was on Fen/Phen for two years, many years ago. Worked very well for him in the past, would consider another weight loss medication now.    The patient presents with his wife today, together they would like to start weight loss program.    Behavior-Related History:  Binge eating screen: Negative       Exercise:   None      Review of Systems   Positive for vision loss, skin dryness.  Sleep apnea screen: Positive, not using CPAP. Pending new device in the next 30 days.   All other ROS were reviewed with patient today and are negative.      PMH/PSH:  I have reviewed the patient's medical, social and family history, allergies,  and medications today.  Prior records reviewed.  FHx Obesity: Positive  Personal Hx of Bariatric Surgery: No  , used to be , grew up on a farm. .      Physical Exam:   /84   Pulse 65   Temp 36.8 °C (98.3 °F)   Ht 1.829 m (6')   Wt (!) 152.6 kg (336 lb 6.4 oz)   SpO2 94%   BMI 45.62 kg/m²   Waist: 94 in  Body fat % 43  REE 2624 kcal/day    Constitutional: Oriented to person, place, and time and well-developed, well-nourished, and in no distress.    HENT: Mild facial plethora.  No Cushingoid features.  No scalloped tongue.  No dental erosions.  No swollen parotids.  Head: Normocephalic.   Eyes: EOM are normal. Pupils are equal, round, and reactive to light. No periorbital edema.  No lateral thinning of eyebrows.  No vertical nystagmus.  Neck: Normal range of motion. Neck supple. No thyromegaly present. No buffalo hump.  Cardiovascular: Normal rate and regular rhythm.  No murmur heard.  Pulmonary/Chest: Effort normal and breath sounds normal. No wheezes.   Abdominal: Soft. Bowel sounds are normal. No pannus but marked distended abdomen.  No ascites.  No palpable hepatosplenomegaly but difficult exam.  No red striae.  Musculoskeletal: Normal range of motion. No edema.   Neurological: Alert and oriented to person, place, and time. Normal reflexes. No cranial nerve deficit. No muscle weakness.  Gait normal.   Skin: Warm and dry. Not diaphoretic. No hirsuitism.  No acanthosis nigricans.  Not excessively dry, scaly.  No acne.  No bruising/ecchymosis.  No hyperpigmentation.  No xanthomas or acrochordon.  No violaceous striae.  No keratosis pilaris.  Psychiatric: Mood, memory, affect and judgment normal.     Laboratory:   Prior labs reviewed. 1/22/18 glucose 117, ALT 69, glycohemoglobin 6.9, triglycerides 263  EKG: Sinus, rate 68, no significant ST-T abnormalities, corrected QT 0.413.  Ordered and reviewed by me today.    Dietitian Assessment: I have reviewed the Dietitian's  assessment related to this encounter.       ASSESSMENT/PLAN:  Body mass index is 45.62 kg/m².   Obesity Stage ([place]) 2; Class 3    1. Body mass index (BMI) of 45.0-49.9 in adult (CMS-HCC)     2. Type 2 diabetes mellitus without complication, without long-term current use of insulin (CMS-HCC)     3. Vitamin D deficiency     4. Obstructive sleep apnea     5. Hyperlipidemia, unspecified hyperlipidemia type       The patient's diabetes, obstructive sleep apnea and hyperlipidemia would improve significantly with reducing carbohydrates and portion sizes. Morning meal replacement should help, along with protein intake throughout the day. May need weight loss medications, pending course.    The patient and I have discussed at length and agree to the following recommendations, which are all addressing the above diagnoses:    Weight Goal: 5% wt loss at one month after start (pt goal weight is 210 lb)  Diet:   Reduce carbs and increase protein as directed  Avoid sweetened drinks  Nguyen colon in am  64+ oz water per day  Cut milk intake by 50%, try for 1% instead of full milk  Plate method for portion size  Physical Activity: Discuss further next visit, consider walking daily  Risk level for moderate/vigorous exercise program: Low  New Rx: None. Consider weight loss medication going forward.  Side Effects: Will review consent if applicable.  Behavior change: Better planning, mindful eating  Follow-up: 2 weeks    Face to face time spent 60 minutes,  with >50% of time devoted to one on one counseling on weight management issues, as documented above.      Thank you for your referral!

## 2018-04-04 ENCOUNTER — APPOINTMENT (OUTPATIENT)
Dept: HEALTH INFORMATION MANAGEMENT | Facility: MEDICAL CENTER | Age: 53
End: 2018-04-04
Payer: COMMERCIAL

## 2018-05-14 ENCOUNTER — APPOINTMENT (OUTPATIENT)
Dept: SLEEP MEDICINE | Facility: MEDICAL CENTER | Age: 53
End: 2018-05-14
Payer: COMMERCIAL

## 2018-06-14 DIAGNOSIS — I10 ESSENTIAL HYPERTENSION: ICD-10-CM

## 2018-06-14 RX ORDER — HYDROCHLOROTHIAZIDE 12.5 MG/1
CAPSULE, GELATIN COATED ORAL
Qty: 90 CAP | Refills: 2 | Status: SHIPPED | OUTPATIENT
Start: 2018-06-14 | End: 2019-03-04 | Stop reason: SDUPTHER

## 2018-06-14 RX ORDER — TELMISARTAN AND AMLODIPINE 10; 40 MG/1; MG/1
TABLET ORAL
Qty: 90 TAB | Refills: 2 | Status: SHIPPED | OUTPATIENT
Start: 2018-06-14 | End: 2019-03-04 | Stop reason: SDUPTHER

## 2018-06-14 RX ORDER — PRAVASTATIN SODIUM 80 MG/1
TABLET ORAL
Qty: 90 TAB | Refills: 2 | Status: SHIPPED | OUTPATIENT
Start: 2018-06-14 | End: 2019-03-04 | Stop reason: SDUPTHER

## 2018-06-14 RX ORDER — METOPROLOL SUCCINATE 200 MG/1
TABLET, EXTENDED RELEASE ORAL
Qty: 90 TAB | Refills: 2 | Status: SHIPPED | OUTPATIENT
Start: 2018-06-14 | End: 2019-03-04 | Stop reason: SDUPTHER

## 2018-06-14 NOTE — TELEPHONE ENCOUNTER
Requested Prescriptions     Signed Prescriptions Disp Refills   • Telmisartan-Amlodipine 40-10 MG Tab 90 Tab 2     Sig: TAKE 1 TABLET DAILY FOR HIGH BLOOD PRESSURE     Authorizing Provider: REGULO SLAUGHTER   • hydrochlorothiazide (MICROZIDE) 12.5 MG capsule 90 Cap 2     Sig: TAKE 1 CAPSULE DAILY     Authorizing Provider: ERGULO SLAUGHTER   • metoprolol (TOPROL-XL) 200 MG XL tablet 90 Tab 2     Sig: TAKE 1 TABLET DAILY FOR HIGH BLOOD PRESSURE     Authorizing Provider: REGULO SLAUGHTER   • pravastatin (PRAVACHOL) 80 MG tablet 90 Tab 2     Sig: TAKE 1 TABLET DAILY     Authorizing Provider: REGULO SLAUGHTER   • metformin (GLUCOPHAGE) 1000 MG tablet 180 Tab 2     Sig: TAKE 1 TABLET TWICE A DAY WITH MEALS     Authorizing Provider: REGULO SLAUGHTER A.P.R.N.

## 2018-07-23 ENCOUNTER — OFFICE VISIT (OUTPATIENT)
Dept: MEDICAL GROUP | Facility: PHYSICIAN GROUP | Age: 53
End: 2018-07-23
Payer: COMMERCIAL

## 2018-07-23 VITALS
RESPIRATION RATE: 14 BRPM | OXYGEN SATURATION: 96 % | BODY MASS INDEX: 42.66 KG/M2 | SYSTOLIC BLOOD PRESSURE: 134 MMHG | WEIGHT: 315 LBS | HEART RATE: 69 BPM | HEIGHT: 72 IN | TEMPERATURE: 97 F | DIASTOLIC BLOOD PRESSURE: 80 MMHG

## 2018-07-23 DIAGNOSIS — I10 ESSENTIAL HYPERTENSION: ICD-10-CM

## 2018-07-23 DIAGNOSIS — L03.115 CELLULITIS OF RIGHT LEG: ICD-10-CM

## 2018-07-23 DIAGNOSIS — E66.01 MORBID OBESITY WITH BMI OF 45.0-49.9, ADULT (HCC): ICD-10-CM

## 2018-07-23 PROCEDURE — 99214 OFFICE O/P EST MOD 30 MIN: CPT | Performed by: NURSE PRACTITIONER

## 2018-07-23 RX ORDER — SULFAMETHOXAZOLE AND TRIMETHOPRIM 800; 160 MG/1; MG/1
1 TABLET ORAL 2 TIMES DAILY
Qty: 20 TAB | Refills: 0 | Status: SHIPPED | OUTPATIENT
Start: 2018-07-23 | End: 2019-03-07

## 2018-07-23 RX ORDER — SULFAMETHOXAZOLE AND TRIMETHOPRIM 800; 160 MG/1; MG/1
1 TABLET ORAL 2 TIMES DAILY
Qty: 20 TAB | Refills: 0 | Status: SHIPPED | OUTPATIENT
Start: 2018-07-23 | End: 2018-07-23 | Stop reason: SDUPTHER

## 2018-07-23 NOTE — PROGRESS NOTES
Chief Complaint   Patient presents with   • Follow-Up   • Rash     right leg        Subjective:   Moe Car is a 53 y.o. male here today for evaluation and management of:    Cellulitis of right leg  Developed red, swollen, rash, on right lower leg.  Hot to touch.  No injury reports.  Old lesion noted, no fever, chills reported.     Body mass index (BMI) of 45.0-49.9 in adult  BMI 45.57 today.  Is working to do portion control with wife at home.      Hypertension  BP today 134/80.  Reports 1005 compliance with Telmisartan/amlodipine and HCTZ.  Also taking metoprolol daily.  No chest pain, shortness of breath reported.  No ankle edema           Current medicines (including changes today)  Current Outpatient Prescriptions   Medication Sig Dispense Refill   • sulfamethoxazole-trimethoprim (BACTRIM DS) 800-160 MG tablet Take 1 Tab by mouth 2 times a day. 20 Tab 0   • Telmisartan-Amlodipine 40-10 MG Tab TAKE 1 TABLET DAILY FOR HIGH BLOOD PRESSURE 90 Tab 2   • hydrochlorothiazide (MICROZIDE) 12.5 MG capsule TAKE 1 CAPSULE DAILY 90 Cap 2   • metoprolol (TOPROL-XL) 200 MG XL tablet TAKE 1 TABLET DAILY FOR HIGH BLOOD PRESSURE 90 Tab 2   • pravastatin (PRAVACHOL) 80 MG tablet TAKE 1 TABLET DAILY 90 Tab 2   • metformin (GLUCOPHAGE) 1000 MG tablet TAKE 1 TABLET TWICE A DAY WITH MEALS 180 Tab 2   • aspirin EC (ECOTRIN) 81 MG Tablet Delayed Response Take 81 mg by mouth every day.     • acetaminophen (TYLENOL) 500 MG Tab Take 500-1,000 mg by mouth every 6 hours as needed.     • allopurinol (ZYLOPRIM) 300 MG Tab TAKE 1 TABLET DAILY 90 Tab 3   • JARDIANCE 25 MG Tab TAKE 1 TABLET DAILY 90 Tab 3   • indomethacin (INDOCIN) 50 MG Cap Take 1 Cap by mouth 3 times a day. For gout, stop when symptoms decrease  Indications: Acute Joint Inflammation in Gout 270 Cap 1   • Cholecalciferol (VITAMIN D-3) 5000 UNITS TABS Take  by mouth.     • Coenzyme Q10 (CO Q 10 PO) Take  by mouth.     • glucose blood (FREESTYLE INSULINX TEST) strip 1  Strip by Other route as needed. 100 Strip 3   • Blood Glucose Monitoring Suppl (FREESTYLE FREEDOM LITE) W/DEVICE KIT by Does not apply route. Test QD 1 Each prn   • glucose blood VI test strips (FREESTYLE LITE) strip by In Vitro route as needed. Test  Strip 3   • acyclovir (ZOVIRAX) 5 % OINT Apply  to affected area(s) every 3 hours. Apply after cleansing 60 g 3     No current facility-administered medications for this visit.      He  has a past medical history of Hyperlipidemia (10/21/2009); Hypertension (10/21/2009); and Obstructive sleep apnea (12/2/2009).    ROS as   No chest pain, no shortness of breath, no abdominal pain       Objective:     Blood pressure 134/80, pulse 69, temperature 36.1 °C (97 °F), resp. rate 14, height 1.829 m (6'), weight (!) 152.4 kg (336 lb), SpO2 96 %. Body mass index is 45.57 kg/m². afebrile  Physical Exam:  Constitutional: Alert, no distress.  Skin: Warm, dry, good turgor,no cyanosis,Red, swollen right lower leg with multiple blisters noted over entire shin.  Warm to touch.  No exudate.   Eye: Equal, round and reactive, conjunctiva clear, lids normal.  Ears: No tenderness, no discharge.  External canals are without any significant edema or erythema.  Gross auditory acuity is intact.  Nose: symmetrical without tenderness, no discharge.  Mouth/Throat: lips without lesion.  Oropharynx clear.    Neck: Trachea midline, no masses, no obvious thyroid enlargement... Range of motion within normal limits.  Neuro: Cranial nerves 2-12 grossly intact.  No sensory deficit.  Respiratory: Unlabored respiratory effort, l  Psych: Alert and oriented x3, normal affect and mood and judgement.        Assessment and Plan:   The following treatment plan was discussed    1. Cellulitis of right leg  This is a new problem to me.  Acute.  Unstable.  Most likely represents a cellulitis, vs shingles.  Bactrim DS started with strict ER instructions reviewed.  Patient to return to clinic in 3days for  re-evaluation.  Discussed keeping leg elevated, keeping blood sugar in control as this heals.    - HEMOGLOBIN A1C; Future  - COMP METABOLIC PANEL; Future  - CBC WITH DIFFERENTIAL; Future    2. Body mass index (BMI) of 45.0-49.9 in adult (HCC)  Chronic, ongoing. Unstable.  Plate method/portion control reviewed. Monitor and follow.     3. Essential hypertension  Chronic, ongoing. BP in range.  Continue with medications.  Discussed the importance of weight loss in helping control his blood pressure.  Continue to watch salt.  Monitor and follow.     4. Morbid obesity with BMI of 45.0-49.9, adult (HCC)  Chronic, ongoing.  See #2  - Patient identified as having weight management issue.  Appropriate orders and counseling given.      Followup: Return in about 3 days (around 7/26/2018) for cellulitis.

## 2018-07-23 NOTE — ASSESSMENT & PLAN NOTE
BP today 134/80.  Reports 1005 compliance with Telmisartan/amlodipine and HCTZ.  Also taking metoprolol daily.  No chest pain, shortness of breath reported.  No ankle edema

## 2018-07-23 NOTE — ASSESSMENT & PLAN NOTE
Developed red, swollen, rash, on right lower leg.  Hot to touch.  No injury reports.  Old lesion noted, no fever, chills reported.

## 2018-07-30 ENCOUNTER — OFFICE VISIT (OUTPATIENT)
Dept: MEDICAL GROUP | Facility: PHYSICIAN GROUP | Age: 53
End: 2018-07-30
Payer: COMMERCIAL

## 2018-07-30 ENCOUNTER — TELEPHONE (OUTPATIENT)
Dept: MEDICAL GROUP | Facility: PHYSICIAN GROUP | Age: 53
End: 2018-07-30

## 2018-07-30 VITALS
DIASTOLIC BLOOD PRESSURE: 78 MMHG | BODY MASS INDEX: 42.66 KG/M2 | HEART RATE: 71 BPM | TEMPERATURE: 97.2 F | OXYGEN SATURATION: 95 % | SYSTOLIC BLOOD PRESSURE: 130 MMHG | WEIGHT: 315 LBS | HEIGHT: 72 IN

## 2018-07-30 DIAGNOSIS — L03.115 CELLULITIS OF RIGHT LEG: ICD-10-CM

## 2018-07-30 PROCEDURE — 99213 OFFICE O/P EST LOW 20 MIN: CPT | Performed by: NURSE PRACTITIONER

## 2018-07-30 ASSESSMENT — PAIN SCALES - GENERAL: PAINLEVEL: NO PAIN

## 2018-07-30 NOTE — TELEPHONE ENCOUNTER
VOICEMAIL  1. Caller Name: Moe Car                      Call Back Number: [phone] (home)     2. Message: Pt stated he still in [place] and would like for you to look at his leg if you can squeeze him in today.    3. Patient approves office to leave a detailed voicemail/MyChart message: yes

## 2018-07-30 NOTE — PROGRESS NOTES
Chief Complaint   Patient presents with   • Edema       Subjective:   Moe Car is a 53 y.o. male here today for evaluation and management of:    Cellulitis of right leg  Patient here for follow up on cellulitis.  Taking Bactrim.  Has 3 days left.  Right leg with less swelling and redness noted.  Raised lesion with clear fluid on right shin.  No fever reported.            Current medicines (including changes today)  Current Outpatient Prescriptions   Medication Sig Dispense Refill   • sulfamethoxazole-trimethoprim (BACTRIM DS) 800-160 MG tablet Take 1 Tab by mouth 2 times a day. 20 Tab 0   • Telmisartan-Amlodipine 40-10 MG Tab TAKE 1 TABLET DAILY FOR HIGH BLOOD PRESSURE 90 Tab 2   • hydrochlorothiazide (MICROZIDE) 12.5 MG capsule TAKE 1 CAPSULE DAILY 90 Cap 2   • metoprolol (TOPROL-XL) 200 MG XL tablet TAKE 1 TABLET DAILY FOR HIGH BLOOD PRESSURE 90 Tab 2   • pravastatin (PRAVACHOL) 80 MG tablet TAKE 1 TABLET DAILY 90 Tab 2   • metformin (GLUCOPHAGE) 1000 MG tablet TAKE 1 TABLET TWICE A DAY WITH MEALS 180 Tab 2   • aspirin EC (ECOTRIN) 81 MG Tablet Delayed Response Take 81 mg by mouth every day.     • acetaminophen (TYLENOL) 500 MG Tab Take 500-1,000 mg by mouth every 6 hours as needed.     • allopurinol (ZYLOPRIM) 300 MG Tab TAKE 1 TABLET DAILY 90 Tab 3   • JARDIANCE 25 MG Tab TAKE 1 TABLET DAILY 90 Tab 3   • indomethacin (INDOCIN) 50 MG Cap Take 1 Cap by mouth 3 times a day. For gout, stop when symptoms decrease  Indications: Acute Joint Inflammation in Gout 270 Cap 1   • Cholecalciferol (VITAMIN D-3) 5000 UNITS TABS Take  by mouth.     • Coenzyme Q10 (CO Q 10 PO) Take  by mouth.     • glucose blood (FREESTYLE INSULINX TEST) strip 1 Strip by Other route as needed. 100 Strip 3   • Blood Glucose Monitoring Suppl (FREESTYLE FREEDOM LITE) W/DEVICE KIT by Does not apply route. Test QD 1 Each prn   • glucose blood VI test strips (FREESTYLE LITE) strip by In Vitro route as needed. Test  Strip 3   •  acyclovir (ZOVIRAX) 5 % OINT Apply  to affected area(s) every 3 hours. Apply after cleansing 60 g 3     No current facility-administered medications for this visit.      He  has a past medical history of Hyperlipidemia (10/21/2009); Hypertension (10/21/2009); and Obstructive sleep apnea (12/2/2009).    ROS as stated in hpi  No chest pain, no shortness of breath, no abdominal pain       Objective:     Blood pressure 130/78, pulse 71, temperature 36.2 °C (97.2 °F), height 1.829 m (6'), weight (!) 153.8 kg (339 lb), SpO2 95 %. Body mass index is 45.98 kg/m². AFEBRILE  Physical Exam:  Constitutional: Alert, no distress.  Skin: Warm, dry, good turgor,no cyanosis, no rashes in visible areas. Right leg with redness, mild swelling and fluid filled lesion on shin.  Improved from last week.   Eye: Equal, round and reactive, conjunctiva clear, lids normal.  Ears: No tenderness, no discharge.  External canals are without any significant edema or erythema.  .  Gross auditory acuity is intact.  Nose: symmetrical without tenderness, no discharge.  Mouth/Throat: lips without lesion.  Oropharynx clear.    Neck: Trachea midline, no masses, no obvious thyroid enlargement... Range of motion within normal limits.  Neuro: Cranial nerves 2-12 grossly intact.  No sensory deficit.  Respiratory: Unlabored respiratory effort,   Psych: Alert and oriented x3, normal affect and mood and judgement.        Assessment and Plan:   The following treatment plan was discussed    1. Cellulitis of right leg  Acute. Improving after 7 days of antibiotics.  Finish course of Septra DS.  If redness is not continuing to resolve by Monday, patient to let me know.  Will consider a round of Keflex or augmentin at that point.  Monitor and follow closely.       Followup: Return if symptoms worsen or fail to improve.

## 2018-08-02 ENCOUNTER — TELEPHONE (OUTPATIENT)
Dept: MEDICAL GROUP | Facility: PHYSICIAN GROUP | Age: 53
End: 2018-08-02

## 2018-08-02 RX ORDER — AMOXICILLIN AND CLAVULANATE POTASSIUM 875; 125 MG/1; MG/1
1 TABLET, FILM COATED ORAL 2 TIMES DAILY
Qty: 14 TAB | Refills: 0 | Status: SHIPPED | OUTPATIENT
Start: 2018-08-02 | End: 2018-08-02 | Stop reason: SDUPTHER

## 2018-08-02 RX ORDER — AMOXICILLIN AND CLAVULANATE POTASSIUM 875; 125 MG/1; MG/1
1 TABLET, FILM COATED ORAL 2 TIMES DAILY
Qty: 14 TAB | Refills: 0 | Status: SHIPPED | OUTPATIENT
Start: 2018-08-02 | End: 2019-03-07

## 2018-08-02 NOTE — TELEPHONE ENCOUNTER
VOICEMAIL  1. Caller Name: Moe                      Call Back Number: [phone] (home)       2. Message: Patient finished 10 days of antibiotics and states you were maybe going to give him another round of a different antibiotic? He is wondering if he can get that, his leg looks more red today than it did yesterday. Please advise     3. Patient approves office to leave a detailed voicemail/MyChart message: N\A

## 2018-08-02 NOTE — TELEPHONE ENCOUNTER
Please let patient know that I am sending over augmentin for him to take for 7 days.  If no improvement after that antibiotic, please let me know.  DOE Finnegan.

## 2018-11-26 RX ORDER — EMPAGLIFLOZIN 25 MG/1
TABLET, FILM COATED ORAL
Qty: 90 TAB | Refills: 1 | Status: SHIPPED | OUTPATIENT
Start: 2018-11-26 | End: 2019-05-22 | Stop reason: SDUPTHER

## 2018-11-26 NOTE — TELEPHONE ENCOUNTER
Requested Prescriptions     Signed Prescriptions Disp Refills   • JARDIANCE 25 MG Tab 90 Tab 1     Sig: TAKE 1 TABLET DAILY     Authorizing Provider: REGULO SLAUGHTER A.P.R.N.

## 2018-11-26 NOTE — TELEPHONE ENCOUNTER
Was the patient seen in the last year in this department? YesLOV 0730/18    Does patient have an active prescription for medications requested? No     Received Request Via: Pharmacy

## 2019-03-04 DIAGNOSIS — M1A.0720 IDIOPATHIC CHRONIC GOUT, LEFT ANKLE AND FOOT, WITHOUT TOPHUS (TOPHI): ICD-10-CM

## 2019-03-04 DIAGNOSIS — I10 ESSENTIAL HYPERTENSION: ICD-10-CM

## 2019-03-04 DIAGNOSIS — E78.5 HYPERLIPIDEMIA, UNSPECIFIED HYPERLIPIDEMIA TYPE: ICD-10-CM

## 2019-03-04 RX ORDER — ALLOPURINOL 300 MG/1
TABLET ORAL
Qty: 90 TAB | Refills: 2 | Status: SHIPPED | OUTPATIENT
Start: 2019-03-04 | End: 2019-11-21 | Stop reason: SDUPTHER

## 2019-03-04 RX ORDER — PRAVASTATIN SODIUM 80 MG/1
TABLET ORAL
Qty: 90 TAB | Refills: 2 | Status: SHIPPED | OUTPATIENT
Start: 2019-03-04 | End: 2019-11-21 | Stop reason: SDUPTHER

## 2019-03-04 RX ORDER — TELMISARTAN AND AMLODIPINE 10; 40 MG/1; MG/1
TABLET ORAL
Qty: 90 TAB | Refills: 2 | Status: SHIPPED | OUTPATIENT
Start: 2019-03-04 | End: 2019-11-21 | Stop reason: SDUPTHER

## 2019-03-04 RX ORDER — HYDROCHLOROTHIAZIDE 12.5 MG/1
CAPSULE, GELATIN COATED ORAL
Qty: 90 CAP | Refills: 2 | Status: SHIPPED | OUTPATIENT
Start: 2019-03-04 | End: 2019-11-21 | Stop reason: SDUPTHER

## 2019-03-04 RX ORDER — METOPROLOL SUCCINATE 200 MG/1
TABLET, EXTENDED RELEASE ORAL
Qty: 90 TAB | Refills: 2 | Status: SHIPPED | OUTPATIENT
Start: 2019-03-04 | End: 2019-11-21 | Stop reason: SDUPTHER

## 2019-03-04 NOTE — TELEPHONE ENCOUNTER
Was the patient seen in the last year in this department? Yes LOV 07/30/18 up coming 03/07/19 LABS 01/22/18    Does patient have an active prescription for medications requested? No     Received Request Via: Pharmacy

## 2019-03-04 NOTE — TELEPHONE ENCOUNTER
Requested Prescriptions     Signed Prescriptions Disp Refills   • Telmisartan-Amlodipine 40-10 MG Tab 90 Tab 2     Sig: TAKE 1 TABLET DAILY FOR HIGH BLOOD PRESSURE     Authorizing Provider: REGULO SLAUGHTER   • metoprolol (TOPROL-XL) 200 MG XL tablet 90 Tab 2     Sig: TAKE 1 TABLET DAILY FOR HIGH BLOOD PRESSURE     Authorizing Provider: REGULO SLAUGHTER   • hydrochlorothiazide (MICROZIDE) 12.5 MG capsule 90 Cap 2     Sig: TAKE 1 CAPSULE DAILY     Authorizing Provider: REGULO SLAUGHTER   • pravastatin (PRAVACHOL) 80 MG tablet 90 Tab 2     Sig: TAKE 1 TABLET DAILY     Authorizing Provider: REGULO SLAUGHTER   • allopurinol (ZYLOPRIM) 300 MG Tab 90 Tab 2     Sig: TAKE 1 TABLET DAILY     Authorizing Provider: REGULO SLAUGHTER A.P.R.N.

## 2019-03-07 ENCOUNTER — OFFICE VISIT (OUTPATIENT)
Dept: MEDICAL GROUP | Facility: PHYSICIAN GROUP | Age: 54
End: 2019-03-07
Payer: COMMERCIAL

## 2019-03-07 VITALS
DIASTOLIC BLOOD PRESSURE: 80 MMHG | SYSTOLIC BLOOD PRESSURE: 138 MMHG | TEMPERATURE: 97.9 F | WEIGHT: 315 LBS | HEART RATE: 87 BPM | BODY MASS INDEX: 42.66 KG/M2 | HEIGHT: 72 IN | OXYGEN SATURATION: 97 %

## 2019-03-07 DIAGNOSIS — E11.9 TYPE 2 DIABETES MELLITUS WITHOUT COMPLICATION, WITHOUT LONG-TERM CURRENT USE OF INSULIN (HCC): ICD-10-CM

## 2019-03-07 DIAGNOSIS — I10 ESSENTIAL HYPERTENSION: ICD-10-CM

## 2019-03-07 DIAGNOSIS — G47.33 OBSTRUCTIVE SLEEP APNEA: ICD-10-CM

## 2019-03-07 DIAGNOSIS — Z23 INFLUENZA VACCINE NEEDED: ICD-10-CM

## 2019-03-07 DIAGNOSIS — M10.9 GOUT, UNSPECIFIED CAUSE, UNSPECIFIED CHRONICITY, UNSPECIFIED SITE: ICD-10-CM

## 2019-03-07 DIAGNOSIS — M1A.0720 IDIOPATHIC CHRONIC GOUT OF LEFT FOOT WITHOUT TOPHUS: ICD-10-CM

## 2019-03-07 DIAGNOSIS — Z00.00 PREVENTATIVE HEALTH CARE: ICD-10-CM

## 2019-03-07 DIAGNOSIS — Z12.11 ENCOUNTER FOR SCREENING FECAL OCCULT BLOOD TESTING: ICD-10-CM

## 2019-03-07 DIAGNOSIS — L03.115 CELLULITIS OF RIGHT LEG: ICD-10-CM

## 2019-03-07 PROBLEM — E66.01 MORBID OBESITY WITH BMI OF 45.0-49.9, ADULT (HCC): Status: RESOLVED | Noted: 2018-07-23 | Resolved: 2019-03-07

## 2019-03-07 PROCEDURE — 99214 OFFICE O/P EST MOD 30 MIN: CPT | Mod: 25 | Performed by: NURSE PRACTITIONER

## 2019-03-07 PROCEDURE — 90471 IMMUNIZATION ADMIN: CPT | Performed by: NURSE PRACTITIONER

## 2019-03-07 PROCEDURE — 90686 IIV4 VACC NO PRSV 0.5 ML IM: CPT | Performed by: NURSE PRACTITIONER

## 2019-03-07 RX ORDER — PHENTERMINE HYDROCHLORIDE 37.5 MG/1
37.5 CAPSULE ORAL EVERY MORNING
Qty: 30 CAP | Refills: 0 | Status: SHIPPED | OUTPATIENT
Start: 2019-03-07 | End: 2019-04-06

## 2019-03-07 ASSESSMENT — PATIENT HEALTH QUESTIONNAIRE - PHQ9: CLINICAL INTERPRETATION OF PHQ2 SCORE: 0

## 2019-03-07 NOTE — PROGRESS NOTES
"Chief Complaint   Patient presents with   • Medication Management   • Medication Refill       Subjective:   Moe Car is a 53 y.o. male here today for evaluation and management of:    Cellulitis of right leg  Issue has resolved at this point.     Body mass index (BMI) of 45.0-49.9 in adult  BMI 46.79.  Reports that he is \"on-call\" at work and having a hard time with food/portion management.  Is off the next three weeks and would like to try phentermine.     Type 2 diabetes mellitus without complication (CMS-[facility])  Reports jardiance and only one metformin daily.  Work schedule is making it difficult to take metformin bid.     Hypertension  Blood pressure today is 128/80.  Weight is up. Telamsarting/hctzand metoprolol.  No chest pain reported.     Chronic idiopathic gout of left foot  Allopurinol daily.  No recent outbreaks.     Obstructive sleep apnea  Using cpap most nights.           Current medicines (including changes today)  Current Outpatient Prescriptions   Medication Sig Dispense Refill   • Multiple Vitamins-Minerals (MULTIVITAMIN ADULT PO) Take  by mouth.     • phentermine 37.5 MG capsule Take 1 Cap by mouth every morning for 30 days. 30 Cap 0   • Telmisartan-Amlodipine 40-10 MG Tab TAKE 1 TABLET DAILY FOR HIGH BLOOD PRESSURE 90 Tab 2   • metoprolol (TOPROL-XL) 200 MG XL tablet TAKE 1 TABLET DAILY FOR HIGH BLOOD PRESSURE 90 Tab 2   • hydrochlorothiazide (MICROZIDE) 12.5 MG capsule TAKE 1 CAPSULE DAILY 90 Cap 2   • pravastatin (PRAVACHOL) 80 MG tablet TAKE 1 TABLET DAILY 90 Tab 2   • allopurinol (ZYLOPRIM) 300 MG Tab TAKE 1 TABLET DAILY 90 Tab 2   • JARDIANCE 25 MG Tab TAKE 1 TABLET DAILY 90 Tab 1   • metformin (GLUCOPHAGE) 1000 MG tablet TAKE 1 TABLET TWICE A DAY WITH MEALS 180 Tab 2   • aspirin EC (ECOTRIN) 81 MG Tablet Delayed Response Take 81 mg by mouth every day.     • Cholecalciferol (VITAMIN D-3) 5000 UNITS TABS Take  by mouth.     • Coenzyme Q10 (CO Q 10 PO) Take  by mouth.     • " acetaminophen (TYLENOL) 500 MG Tab Take 500-1,000 mg by mouth every 6 hours as needed.     • indomethacin (INDOCIN) 50 MG Cap Take 1 Cap by mouth 3 times a day. For gout, stop when symptoms decrease  Indications: Acute Joint Inflammation in Gout 270 Cap 1   • glucose blood (FREESTYLE INSULINX TEST) strip 1 Strip by Other route as needed. (Patient not taking: Reported on 3/7/2019) 100 Strip 3   • acyclovir (ZOVIRAX) 5 % OINT Apply  to affected area(s) every 3 hours. Apply after cleansing 60 g 3   • Blood Glucose Monitoring Suppl (FREESTYLE FREEDOM LITE) W/DEVICE KIT by Does not apply route. Test QD (Patient not taking: Reported on 3/7/2019) 1 Each prn   • glucose blood VI test strips (FREESTYLE LITE) strip by In Vitro route as needed. Test QD (Patient not taking: Reported on 3/7/2019) 100 Strip 3     No current facility-administered medications for this visit.      He  has a past medical history of Hyperlipidemia (10/21/2009); Hypertension (10/21/2009); and Obstructive sleep apnea (12/2/2009).    ROS as stated in hpi  No chest pain, no shortness of breath, no abdominal pain       Objective:     Blood pressure 138/80, pulse 87, temperature 36.6 °C (97.9 °F), temperature source Temporal, height 1.829 m (6'), weight (!) 156.5 kg (345 lb), SpO2 97 %. Body mass index is 46.79 kg/m².   Physical Exam:  Constitutional: Alert, no distress.  Skin: Warm, dry, good turgor,no cyanosis, no rashes in visible areas.  Eye: Equal, round and reactive, conjunctiva clear, lids normal.  Ears: No tenderness, no discharge.  External canals are without any significant edema or erythema.  Tympanic membranes are without any inflammation, no effusion.  Gross auditory acuity is intact.  Nose: symmetrical without tenderness, no discharge.  Mouth/Throat: lips without lesion.  Oropharynx clear.  Throat without erythema, exudates or tonsillar enlargement.  Neck: Trachea midline, no masses, no obvious thyroid enlargement.. No cervical or  supraclavicular lymphadenopathy. Range of motion within normal limits.  Neuro: Cranial nerves 2-12 grossly intact.  No sensory deficit.  Respiratory: Unlabored respiratory effort, lungs clear to auscultation, no wheezes, no ronchi.  Cardiovascular: Normal S1, S2, no murmur, no edema.  Abdomen: Soft, non-tender, no masses, no guarding,  no hepatosplenomegaly.  Psych: Alert and oriented x3, normal affect and mood and judgement.        Assessment and Plan:   The following treatment plan was discussed    1. Influenza vaccine needed  Due for vaccine, no acute illness  I have placed the below orders and discussed them with an approved delegating provider.  The MA is performing the below orders under the direction of Dr. Mario MD.    - Influenza Vaccine Quad Injection >3Y (PF)    2. Cellulitis of right leg  Issue has resolved.  Discussed that he is at increased risk due to diabetes and obesity.     3. Body mass index (BMI) of 45.0-49.9 in adult (HCC)  Chronic,ongoing, worsening.  Will try phentermine. Discussed portion control and exercise.  Goal to lose 10 pounds in the next 6 months. Monitor and follow  - phentermine 37.5 MG capsule; Take 1 Cap by mouth every morning for 30 days.  Dispense: 30 Cap; Refill: 0    4. Type 2 diabetes mellitus without complication, without long-term current use of insulin (HCC)  Chronic, ongoing. Due for labs.  Continue with jardiance and metformin.  May consider changing to XR metformin as patient is having a difficult time with bid dosing due to work schedule.  Monitor A1c, adjust as needed.   - HEMOGLOBIN A1C; Future  - MICROALBUMIN CREAT RATIO URINE; Future  - Lipid Profile; Future    5. Essential hypertension  Chronic, opngoing. Stable.  Refills provided.  Discussed lifestyle changes to help improve his control.   - CBC WITH DIFFERENTIAL; Future  - Comp Metabolic Panel; Future    6. Preventative health care  Preventative health screening.  Monitor PSA.   - PROSTATE SPECIFIC AG  DIAGNOSTIC; Future    7. Encounter for screening fecal occult blood testing  Due for FIT.  Order  And kit provided.  Monitor.   - OCCULT BLOOD FECES IMMUNOASSAY; Future    8. Gout, unspecified cause, unspecified chronicity, unspecified site  Chronic, ongoing. Stable.  Continue with allopurinol daily.  Hydration.  Weight loss  - URIC ACID; Future    9. Idiopathic chronic gout of left foot without tophus  See #8    10. Obstructive sleep apnea  Chronic, ongoing. Treated with CPAP.  Reports compliance.  Monitor and follow.       Followup: Return in about 6 months (around 9/7/2019) for weight management.         Educated in proper administration of medication(s) ordered today including safety, possible SE, risks, benefits, rationale and alternatives to therapy.     Please note that this dictation was created using voice recognition software. I have made every reasonable attempt to correct obvious errors, but I expect that there are errors of grammar and possibly content that I did not discover before finalizing the note.

## 2019-03-07 NOTE — ASSESSMENT & PLAN NOTE
Blood pressure today is 128/80.  Weight is up. Telamsarting/hctzand metoprolol.  No chest pain reported.

## 2019-03-07 NOTE — ASSESSMENT & PLAN NOTE
"BMI 46.79.  Reports that he is \"on-call\" at work and having a hard time with food/portion management.  Is off the next three weeks and would like to try phentermine.   "

## 2019-03-07 NOTE — ASSESSMENT & PLAN NOTE
Reports jardiance and only one metformin daily.  Work schedule is making it difficult to take metformin bid.

## 2019-04-25 ENCOUNTER — HOSPITAL ENCOUNTER (OUTPATIENT)
Facility: MEDICAL CENTER | Age: 54
End: 2019-04-25
Attending: NURSE PRACTITIONER
Payer: COMMERCIAL

## 2019-04-25 PROCEDURE — 82274 ASSAY TEST FOR BLOOD FECAL: CPT

## 2019-04-29 DIAGNOSIS — Z12.11 ENCOUNTER FOR SCREENING FECAL OCCULT BLOOD TESTING: ICD-10-CM

## 2019-04-30 LAB — HEMOCCULT STL QL IA: NEGATIVE

## 2019-05-22 RX ORDER — METFORMIN HYDROCHLORIDE 750 MG/1
750 TABLET, EXTENDED RELEASE ORAL DAILY
Qty: 90 TAB | Refills: 3 | Status: SHIPPED | OUTPATIENT
Start: 2019-05-22 | End: 2020-02-06

## 2019-05-22 RX ORDER — EMPAGLIFLOZIN 25 MG/1
TABLET, FILM COATED ORAL
Qty: 90 TAB | Refills: 2 | Status: SHIPPED | OUTPATIENT
Start: 2019-05-22 | End: 2020-03-03

## 2019-05-22 NOTE — TELEPHONE ENCOUNTER
Was the patient seen in the last year in this department? Yes    Does patient have an active prescription for medications requested? No     Received Request Via: Pharmacy     PATIENT STATES YOU WERE MAYBE GOING TO CHANGE HIS METFORMIN TO AN EXTENDED RELEASE??

## 2019-05-23 NOTE — TELEPHONE ENCOUNTER
Requested Prescriptions     Signed Prescriptions Disp Refills   • JARDIANCE 25 MG Tab 90 Tab 2     Sig: TAKE 1 TABLET DAILY     Authorizing Provider: REGULO SLAUGHTER   • metFORMIN ER (GLUCOPHAGE XR) 750 MG TABLET SR 24 HR 90 Tab 3     Sig: Take 1 Tab by mouth every day.     Authorizing Provider: REGULO SLAUGHTER     Refused Prescriptions Disp Refills   • metformin (GLUCOPHAGE) 1000 MG tablet [Pharmacy Med Name: METFORMIN HCL TABS 1000MG] 180 Tab      Sig: TAKE 1 TABLET TWICE A DAY WITH MEALS     Refused By: REGULO SLAUGHTER     Reason for Refusal: Request already responded to by other means (e.g. phone or fax)     Added daily metformin 750 mg extended release.  Discontinue bid dosing of the 1000 mg.  Please check blood sugars in am to get a new baseline on the new medication.  DOE Finnegan.

## 2019-05-23 NOTE — TELEPHONE ENCOUNTER
Phone Number Called: [phone] (home)       Call outcome: left message for patient to call back regarding message below    Message:

## 2019-11-21 DIAGNOSIS — M1A.0720 IDIOPATHIC CHRONIC GOUT, LEFT ANKLE AND FOOT, WITHOUT TOPHUS (TOPHI): ICD-10-CM

## 2019-11-21 DIAGNOSIS — I10 ESSENTIAL HYPERTENSION: ICD-10-CM

## 2019-11-21 DIAGNOSIS — E78.5 HYPERLIPIDEMIA, UNSPECIFIED HYPERLIPIDEMIA TYPE: ICD-10-CM

## 2019-11-21 RX ORDER — TELMISARTAN AND AMLODIPINE 10; 40 MG/1; MG/1
TABLET ORAL
Qty: 90 TAB | Refills: 3 | Status: SHIPPED | OUTPATIENT
Start: 2019-11-21 | End: 2020-11-19 | Stop reason: SDUPTHER

## 2019-11-21 RX ORDER — HYDROCHLOROTHIAZIDE 12.5 MG/1
CAPSULE, GELATIN COATED ORAL
Qty: 90 CAP | Refills: 3 | Status: SHIPPED | OUTPATIENT
Start: 2019-11-21 | End: 2020-11-19 | Stop reason: SDUPTHER

## 2019-11-21 RX ORDER — ALLOPURINOL 300 MG/1
TABLET ORAL
Qty: 90 TAB | Refills: 3 | Status: SHIPPED | OUTPATIENT
Start: 2019-11-21 | End: 2020-11-19 | Stop reason: SDUPTHER

## 2019-11-21 RX ORDER — METOPROLOL SUCCINATE 200 MG/1
TABLET, EXTENDED RELEASE ORAL
Qty: 90 TAB | Refills: 3 | Status: SHIPPED | OUTPATIENT
Start: 2019-11-21 | End: 2020-11-19 | Stop reason: SDUPTHER

## 2019-11-21 RX ORDER — PRAVASTATIN SODIUM 80 MG/1
TABLET ORAL
Qty: 90 TAB | Refills: 3 | Status: SHIPPED | OUTPATIENT
Start: 2019-11-21 | End: 2020-11-19 | Stop reason: SDUPTHER

## 2019-11-21 NOTE — TELEPHONE ENCOUNTER
Was the patient seen in the last year in this department? Yes LOV 03/07/19    Does patient have an active prescription for medications requested? No     Received Request Via: Pharmacy

## 2019-11-21 NOTE — TELEPHONE ENCOUNTER
Requested Prescriptions     Signed Prescriptions Disp Refills   • allopurinol (ZYLOPRIM) 300 MG Tab 90 Tab 3     Sig: TAKE 1 TABLET DAILY     Authorizing Provider: REGULO SLAUGHTER   • Telmisartan-amLODIPine 40-10 MG Tab 90 Tab 3     Sig: TAKE 1 TABLET DAILY FOR HIGH BLOOD PRESSURE     Authorizing Provider: REGULO SLAUGHTER   • pravastatin (PRAVACHOL) 80 MG tablet 90 Tab 3     Sig: TAKE 1 TABLET DAILY     Authorizing Provider: REGULO SLAUGHTER   • metoprolol (TOPROL-XL) 200 MG XL tablet 90 Tab 3     Sig: TAKE 1 TABLET DAILY FOR HIGH BLOOD PRESSURE     Authorizing Provider: REGULO SLAUGHTER   • hydrochlorothiazide (MICROZIDE) 12.5 MG capsule 90 Cap 3     Sig: TAKE 1 CAPSULE DAILY     Authorizing Provider: REGULO SLAUGHTER A.P.R.N.

## 2020-02-06 ENCOUNTER — OFFICE VISIT (OUTPATIENT)
Dept: MEDICAL GROUP | Facility: PHYSICIAN GROUP | Age: 55
End: 2020-02-06
Payer: COMMERCIAL

## 2020-02-06 VITALS
OXYGEN SATURATION: 99 % | DIASTOLIC BLOOD PRESSURE: 80 MMHG | RESPIRATION RATE: 16 BRPM | SYSTOLIC BLOOD PRESSURE: 132 MMHG | BODY MASS INDEX: 42.66 KG/M2 | HEART RATE: 80 BPM | WEIGHT: 315 LBS | TEMPERATURE: 98.1 F | HEIGHT: 72 IN

## 2020-02-06 DIAGNOSIS — E78.5 HYPERLIPIDEMIA, UNSPECIFIED HYPERLIPIDEMIA TYPE: ICD-10-CM

## 2020-02-06 DIAGNOSIS — E11.9 TYPE 2 DIABETES MELLITUS WITHOUT COMPLICATION, WITHOUT LONG-TERM CURRENT USE OF INSULIN (HCC): ICD-10-CM

## 2020-02-06 PROCEDURE — 99214 OFFICE O/P EST MOD 30 MIN: CPT | Performed by: NURSE PRACTITIONER

## 2020-02-06 RX ORDER — METFORMIN HYDROCHLORIDE 750 MG/1
750 TABLET, EXTENDED RELEASE ORAL 2 TIMES DAILY
Qty: 180 TAB | Refills: 3
Start: 2020-02-06 | End: 2020-03-11 | Stop reason: SDUPTHER

## 2020-02-07 ENCOUNTER — HOSPITAL ENCOUNTER (OUTPATIENT)
Dept: LAB | Facility: MEDICAL CENTER | Age: 55
End: 2020-02-07
Attending: NURSE PRACTITIONER
Payer: COMMERCIAL

## 2020-02-07 DIAGNOSIS — E78.5 HYPERLIPIDEMIA, UNSPECIFIED HYPERLIPIDEMIA TYPE: ICD-10-CM

## 2020-02-07 DIAGNOSIS — E11.9 TYPE 2 DIABETES MELLITUS WITHOUT COMPLICATION, WITHOUT LONG-TERM CURRENT USE OF INSULIN (HCC): ICD-10-CM

## 2020-02-07 LAB
ALBUMIN SERPL BCP-MCNC: 4.7 G/DL (ref 3.2–4.9)
ALBUMIN/GLOB SERPL: 1.9 G/DL
ALP SERPL-CCNC: 71 U/L (ref 30–99)
ALT SERPL-CCNC: 48 U/L (ref 2–50)
ANION GAP SERPL CALC-SCNC: 8 MMOL/L (ref 0–11.9)
AST SERPL-CCNC: 27 U/L (ref 12–45)
BASOPHILS # BLD AUTO: 0.6 % (ref 0–1.8)
BASOPHILS # BLD: 0.04 K/UL (ref 0–0.12)
BILIRUB SERPL-MCNC: 0.7 MG/DL (ref 0.1–1.5)
BUN SERPL-MCNC: 20 MG/DL (ref 8–22)
CALCIUM SERPL-MCNC: 9.7 MG/DL (ref 8.5–10.5)
CHLORIDE SERPL-SCNC: 101 MMOL/L (ref 96–112)
CHOLEST SERPL-MCNC: 227 MG/DL (ref 100–199)
CO2 SERPL-SCNC: 29 MMOL/L (ref 20–33)
CREAT SERPL-MCNC: 0.78 MG/DL (ref 0.5–1.4)
CREAT UR-MCNC: 71.5 MG/DL
EOSINOPHIL # BLD AUTO: 0.14 K/UL (ref 0–0.51)
EOSINOPHIL NFR BLD: 1.9 % (ref 0–6.9)
ERYTHROCYTE [DISTWIDTH] IN BLOOD BY AUTOMATED COUNT: 43.9 FL (ref 35.9–50)
EST. AVERAGE GLUCOSE BLD GHB EST-MCNC: 203 MG/DL
FASTING STATUS PATIENT QL REPORTED: NORMAL
GLOBULIN SER CALC-MCNC: 2.5 G/DL (ref 1.9–3.5)
GLUCOSE SERPL-MCNC: 157 MG/DL (ref 65–99)
HBA1C MFR BLD: 8.7 % (ref 0–5.6)
HCT VFR BLD AUTO: 52.1 % (ref 42–52)
HDLC SERPL-MCNC: 41 MG/DL
HGB BLD-MCNC: 17.2 G/DL (ref 14–18)
IMM GRANULOCYTES # BLD AUTO: 0.02 K/UL (ref 0–0.11)
IMM GRANULOCYTES NFR BLD AUTO: 0.3 % (ref 0–0.9)
LDLC SERPL CALC-MCNC: ABNORMAL MG/DL
LYMPHOCYTES # BLD AUTO: 2.41 K/UL (ref 1–4.8)
LYMPHOCYTES NFR BLD: 33.6 % (ref 22–41)
MCH RBC QN AUTO: 31.7 PG (ref 27–33)
MCHC RBC AUTO-ENTMCNC: 33 G/DL (ref 33.7–35.3)
MCV RBC AUTO: 96.1 FL (ref 81.4–97.8)
MICROALBUMIN UR-MCNC: 2.3 MG/DL
MICROALBUMIN/CREAT UR: 32 MG/G (ref 0–30)
MONOCYTES # BLD AUTO: 0.76 K/UL (ref 0–0.85)
MONOCYTES NFR BLD AUTO: 10.6 % (ref 0–13.4)
NEUTROPHILS # BLD AUTO: 3.81 K/UL (ref 1.82–7.42)
NEUTROPHILS NFR BLD: 53 % (ref 44–72)
NRBC # BLD AUTO: 0 K/UL
NRBC BLD-RTO: 0 /100 WBC
PLATELET # BLD AUTO: 265 K/UL (ref 164–446)
PMV BLD AUTO: 10.6 FL (ref 9–12.9)
POTASSIUM SERPL-SCNC: 4.5 MMOL/L (ref 3.6–5.5)
PROT SERPL-MCNC: 7.2 G/DL (ref 6–8.2)
RBC # BLD AUTO: 5.42 M/UL (ref 4.7–6.1)
SODIUM SERPL-SCNC: 138 MMOL/L (ref 135–145)
TRIGL SERPL-MCNC: 474 MG/DL (ref 0–149)
WBC # BLD AUTO: 7.2 K/UL (ref 4.8–10.8)

## 2020-02-07 PROCEDURE — 82570 ASSAY OF URINE CREATININE: CPT

## 2020-02-07 PROCEDURE — 80061 LIPID PANEL: CPT

## 2020-02-07 PROCEDURE — 80053 COMPREHEN METABOLIC PANEL: CPT

## 2020-02-07 PROCEDURE — 82043 UR ALBUMIN QUANTITATIVE: CPT

## 2020-02-07 PROCEDURE — 85025 COMPLETE CBC W/AUTO DIFF WBC: CPT

## 2020-02-07 PROCEDURE — 83036 HEMOGLOBIN GLYCOSYLATED A1C: CPT

## 2020-02-07 PROCEDURE — 36415 COLL VENOUS BLD VENIPUNCTURE: CPT

## 2020-02-07 NOTE — ASSESSMENT & PLAN NOTE
Presents with concerns over increasing blood sugars, over 200.  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  Patient is not following diabetic diet.  He is taking metformin 750 XR one daily and jardiance 25 mg daily.  Taking statin and telmisartan/amlodipine with fair control of blood pressure.  He is overdue for all lab work, including a1c.  Patient denies chest pain shortness of breath, headaches, numbness or tingling in extremities.

## 2020-02-07 NOTE — PROGRESS NOTES
Chief Complaint   Patient presents with   • Follow-Up   • Diabetes   • Hypertension       Subjective:   Israel Car is a 54 y.o. male here today for evaluation and management of:    Type 2 diabetes mellitus without complication (CMS-[facility])  Presents with concerns over increasing blood sugars, over 200.  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  Patient is not following diabetic diet.  He is taking metformin 750 XR one daily and jardiance 25 mg daily.  Taking statin and telmisartan/amlodipine with fair control of blood pressure.  He is overdue for all lab work, including a1c.  Patient denies chest pain shortness of breath, headaches, numbness or tingling in extremities.      Body mass index (BMI) of 45.0-49.9 in adult  BMI up to 48.15.  Requesting referral to go back to weight doctor.  Does not want to consider gastric sleeve surgery.  Referral placed.      Hyperlipidemia  Overdue for labs  Last labs showed elevated triglycerides consistent with his diabetes not well controlled.  Results for ISRAEL CAR (MRN [id number]) as of 2/6/2020 17:35   Ref. Range 1/22/2018 09:28   Cholesterol,Tot Latest Ref Range: 100 - 199 mg/dL 188   Triglycerides Latest Ref Range: 0 - 149 mg/dL 263 (H)   HDL Latest Ref Range: >=40 mg/dL 42   LDL Latest Ref Range: <100 mg/dL 93            Current medicines (including changes today)  Current Outpatient Medications   Medication Sig Dispense Refill   • metFORMIN ER (GLUCOPHAGE XR) 750 MG TABLET SR 24 HR Take 1 Tab by mouth 2 times a day. 180 Tab 3   • Semaglutide, 1 MG/DOSE, 2 MG/1.5ML Solution Pen-injector Inject 1 Dose as instructed every 7 days. 4 PEN 6   • allopurinol (ZYLOPRIM) 300 MG Tab TAKE 1 TABLET DAILY 90 Tab 3   • Telmisartan-amLODIPine 40-10 MG Tab TAKE 1 TABLET DAILY FOR HIGH BLOOD PRESSURE 90 Tab 3   • pravastatin (PRAVACHOL) 80 MG tablet TAKE 1 TABLET DAILY 90 Tab 3   • metoprolol (TOPROL-XL) 200  MG XL tablet TAKE 1 TABLET DAILY FOR HIGH BLOOD PRESSURE 90 Tab 3   • hydrochlorothiazide (MICROZIDE) 12.5 MG capsule TAKE 1 CAPSULE DAILY 90 Cap 3   • JARDIANCE 25 MG Tab TAKE 1 TABLET DAILY 90 Tab 2   • Multiple Vitamins-Minerals (MULTIVITAMIN ADULT PO) Take  by mouth.     • aspirin EC (ECOTRIN) 81 MG Tablet Delayed Response Take 81 mg by mouth every day.     • acetaminophen (TYLENOL) 500 MG Tab Take 500-1,000 mg by mouth every 6 hours as needed.     • indomethacin (INDOCIN) 50 MG Cap Take 1 Cap by mouth 3 times a day. For gout, stop when symptoms decrease  Indications: Acute Joint Inflammation in Gout 270 Cap 1   • Cholecalciferol (VITAMIN D-3) 5000 UNITS TABS Take  by mouth.     • Coenzyme Q10 (CO Q 10 PO) Take  by mouth.     • glucose blood (FREESTYLE INSULINX TEST) strip 1 Strip by Other route as needed. (Patient not taking: Reported on 3/7/2019) 100 Strip 3   • acyclovir (ZOVIRAX) 5 % OINT Apply  to affected area(s) every 3 hours. Apply after cleansing 60 g 3   • Blood Glucose Monitoring Suppl (FREESTYLE FREEDOM LITE) W/DEVICE KIT by Does not apply route. Test QD (Patient not taking: Reported on 3/7/2019) 1 Each prn   • glucose blood VI test strips (FREESTYLE LITE) strip by In Vitro route as needed. Test QD (Patient not taking: Reported on 3/7/2019) 100 Strip 3     No current facility-administered medications for this visit.      He  has a past medical history of Hyperlipidemia (10/21/2009), Hypertension (10/21/2009), and Obstructive sleep apnea (12/2/2009).    ROS as stated in hpi  No chest pain, no shortness of breath, no abdominal pain       Objective:     /80   Pulse 80   Temp 36.7 °C (98.1 °F) (Temporal)   Resp 16   Ht 1.829 m (6')   Wt (!) 161 kg (355 lb)   SpO2 99%  Body mass index is 48.15 kg/m². weight up 10 pounds.   Physical Exam:  Constitutional: Alert, no distress.  Skin: Warm, dry, good turgor,no cyanosis, no rashes in visible areas.  Eye: Equal, round and reactive, conjunctiva  clear, lids normal.  Ears: No tenderness, no discharge.  External canals are without any significant edema or erythema.  Tympanic membranes are without any inflammation, no effusion.  Gross auditory acuity is intact.  Nose: symmetrical without tenderness, no discharge.  Mouth/Throat: lips without lesion.  Oropharynx clear.  Throat without erythema, exudates or tonsillar enlargement.  Neck: Trachea midline, no masses, no obvious thyroid enlargement.. No cervical or supraclavicular lymphadenopathy. Range of motion within normal limits.  Neuro: Cranial nerves 2-12 grossly intact.  No sensory deficit.  Respiratory: Unlabored respiratory effort, lungs clear to auscultation, no wheezes, no ronchi.  Cardiovascular: Normal S1, S2, no murmur, no edema.  Abdomen: Soft, morbidly obese non-tender, no masses, no guarding,  no hepatosplenomegaly.  Psych: Alert and oriented x3, normal affect and mood and judgement.        Assessment and Plan:   The following treatment plan was discussed    1. Hyperlipidemia, unspecified hyperlipidemia type  Chronic, ongoing. Overdue for labs.  Orders provided.  Continue with statin nightly.  Encouraged weight loss.  Referral to weight management.   - Lipid Profile; Future    2. Type 2 diabetes mellitus without complication, without long-term current use of insulin (HCC)  Chronic, ongoing. Uncontrolled.  Increase metformin to bid, add ozempic injection weekly.  Continue with januvia.  Urgent referral to endocrinology as patient is in need of closer control and help with weight loss.  Referral to weight management.  Overdue for labs.  Orders provided.  Monitor and follow.   - CBC WITH DIFFERENTIAL; Future  - Comp Metabolic Panel; Future  - HEMOGLOBIN A1C; Future  - MICROALBUMIN CREAT RATIO URINE; Future  - REFERRAL TO ENDOCRINOLOGY    3. Body mass index (BMI) of 45.0-49.9 in adult (HCC)  Chronic, ongoing. Worsening.  Morbidly obese contributing to his uncontrolled diabetes.  Urgent referral placed.   Monitor and follow.   - REFERRAL TO [facility] (HIP) Services Requested: [facility] Weight Management Program; Reason for Referral? BMI>30; Reason for Visit: Overweight/Obesity, Management of Chronic Condition      Followup: Return in about 2 months (around 4/6/2020).         Educated in proper administration of medication(s) ordered today including safety, possible SE, risks, benefits, rationale and alternatives to therapy.     Please note that this dictation was created using voice recognition software. I have made every reasonable attempt to correct obvious errors, but I expect that there are errors of grammar and possibly content that I did not discover before finalizing the note.

## 2020-02-07 NOTE — ASSESSMENT & PLAN NOTE
BMI up to 48.15.  Requesting referral to go back to weight doctor.  Does not want to consider gastric sleeve surgery.  Referral placed.

## 2020-02-07 NOTE — ASSESSMENT & PLAN NOTE
Overdue for labs  Last labs showed elevated triglycerides consistent with his diabetes not well controlled.  Results for ISRAEL CALDERA (MRN [id number]) as of 2/6/2020 17:35   Ref. Range 1/22/2018 09:28   Cholesterol,Tot Latest Ref Range: 100 - 199 mg/dL 188   Triglycerides Latest Ref Range: 0 - 149 mg/dL 263 (H)   HDL Latest Ref Range: >=40 mg/dL 42   LDL Latest Ref Range: <100 mg/dL 93

## 2020-02-10 ENCOUNTER — TELEPHONE (OUTPATIENT)
Dept: MEDICAL GROUP | Facility: PHYSICIAN GROUP | Age: 55
End: 2020-02-10

## 2020-02-10 NOTE — TELEPHONE ENCOUNTER
----- Message from PATTI Finnegan sent at 2/10/2020  8:32 AM PST -----  Moe  Labs are back.  Cholesterol has spiked up a bit, but your triglycerides have really shot up at 474.  This is diet and weight related.  .  When triglycerides are that elevated, we can get into trouble with pancreatitis, which is a serious complication.  Please decrease sugars and fats in your diet immediately.    A1c came in at 8.7, up from 6.9 two years ago.  We have made the changes at our office visit and we will move forward with those new medication/dosages along with the weight loss consultation and referral to endocrinology.  Call me if you have questions or concerns.  PATTI Finnegan

## 2020-02-10 NOTE — LETTER
February 10, 2020         Moe Joey Josep  [address] [place] [address]        Dear Moe:      Below are the results from your recent visit:    Labs are back.  Cholesterol has spiked up a bit, but your triglycerides have really shot up at 474.  This is diet and weight related.  .  When triglycerides are that elevated, we can get into trouble with pancreatitis, which is a serious complication.  Please decrease sugars and fats in your diet immediately.    A1c came in at 8.7, up from 6.9 two years ago.  We have made the changes at our office visit and we will move forward with those new medication/dosages along with the weight loss consultation and referral to endocrinology.   Call me if you have questions or concerns.   PATTI Finnegan     Resulted Orders   Lipid Profile   Result Value Ref Range    Cholesterol,Tot 227 (H) 100 - 199 mg/dL    Triglycerides 474 (H) 0 - 149 mg/dL    HDL 41 >=40 mg/dL    LDL see below <100 mg/dL      Comment:      The calculated LDL value is invalid due to the triglyceride  value of >400 mg/dL.      Narrative    Fast 8-10 hours, OK to drink water as needed during fast,  take medications per your provider's instructions.  Request patient fasting?->Yes  Fasting Instructions:->Fast 8-10 hours, OK to drink water as  needed during fast, take medications per your provider's  instruction.   MICROALBUMIN CREAT RATIO URINE   Result Value Ref Range    Creatinine, Urine 71.50 mg/dL    Microalbumin, Urine Random 2.3 mg/dL    Micro Alb Creat Ratio 32 (H) 0 - 30 mg/g   HEMOGLOBIN A1C   Result Value Ref Range    Glycohemoglobin 8.7 (H) 0.0 - 5.6 %      Comment:      Increased risk for diabetes:  5.7 -6.4%  Diabetes:  >6.4%  Glycemic control for adults with diabetes:  <7.0%  The above interpretations are per ADA guidelines.  Diagnosis  of diabetes mellitus on the basis of elevated Hemoglobin A1c  should be confirmed by repeating the Hb A1c test.      Est Avg Glucose 203 mg/dL    Comment:      The eAG calculation is based on the A1c-Derived Daily Glucose  (ADAG) study.  See the ADA's website for additional information.      Narrative    Fast 8-10 hours, OK to drink water as needed during fast,  take medications per your provider's instructions.  Request patient fasting?->Yes  Fasting Instructions:->Fast 8-10 hours, OK to drink water as  needed during fast, take medications per your provider's  instruction.   Comp Metabolic Panel   Result Value Ref Range    Sodium 138 135 - 145 mmol/L    Potassium 4.5 3.6 - 5.5 mmol/L    Chloride 101 96 - 112 mmol/L    Co2 29 20 - 33 mmol/L    Anion Gap 8.0 0.0 - 11.9    Glucose 157 (H) 65 - 99 mg/dL    Bun 20 8 - 22 mg/dL    Creatinine 0.78 0.50 - 1.40 mg/dL    Calcium 9.7 8.5 - 10.5 mg/dL    AST(SGOT) 27 12 - 45 U/L    ALT(SGPT) 48 2 - 50 U/L    Alkaline Phosphatase 71 30 - 99 U/L    Total Bilirubin 0.7 0.1 - 1.5 mg/dL    Albumin 4.7 3.2 - 4.9 g/dL    Total Protein 7.2 6.0 - 8.2 g/dL    Globulin 2.5 1.9 - 3.5 g/dL    A-G Ratio 1.9 g/dL    Narrative    Fast 8-10 hours, OK to drink water as needed during fast,  take medications per your provider's instructions.  Request patient fasting?->Yes  Fasting Instructions:->Fast 8-10 hours, OK to drink water as  needed during fast, take medications per your provider's  instruction.   CBC WITH DIFFERENTIAL   Result Value Ref Range    WBC 7.2 4.8 - 10.8 K/uL    RBC 5.42 4.70 - 6.10 M/uL    Hemoglobin 17.2 14.0 - 18.0 g/dL    Hematocrit 52.1 (H) 42.0 - 52.0 %    MCV 96.1 81.4 - 97.8 fL    MCH 31.7 27.0 - 33.0 pg    MCHC 33.0 (L) 33.7 - 35.3 g/dL    RDW 43.9 35.9 - 50.0 fL    Platelet Count 265 164 - 446 K/uL    MPV 10.6 9.0 - 12.9 fL    Neutrophils-Polys 53.00 44.00 - 72.00 %    Lymphocytes 33.60 22.00 - 41.00 %    Monocytes 10.60 0.00 - 13.40 %    Eosinophils 1.90 0.00 - 6.90 %    Basophils 0.60 0.00 - 1.80 %    Immature Granulocytes 0.30 0.00 - 0.90 %    Nucleated RBC 0.00 /100 WBC    Neutrophils (Absolute) 3.81 1.82 -  7.42 K/uL      Comment:      Includes immature neutrophils, if present.    Lymphs (Absolute) 2.41 1.00 - 4.80 K/uL    Monos (Absolute) 0.76 0.00 - 0.85 K/uL    Eos (Absolute) 0.14 0.00 - 0.51 K/uL    Baso (Absolute) 0.04 0.00 - 0.12 K/uL    Immature Granulocytes (abs) 0.02 0.00 - 0.11 K/uL    NRBC (Absolute) 0.00 K/uL    Narrative    Fast 8-10 hours, OK to drink water as needed during fast,  take medications per your provider's instructions.  Request patient fasting?->Yes  Fasting Instructions:->Fast 8-10 hours, OK to drink water as  needed during fast, take medications per your provider's  instruction.   FASTING STATUS   Result Value Ref Range    Fasting Status Fasting     Narrative    Fast 8-10 hours, OK to drink water as needed during fast,  take medications per your provider's instructions.  Request patient fasting?->Yes  Fasting Instructions:->Fast 8-10 hours, OK to drink water as  needed during fast, take medications per your provider's  instruction.   ESTIMATED GFR   Result Value Ref Range    GFR If African American >60 >60 mL/min/1.73 m 2    GFR If Non African American >60 >60 mL/min/1.73 m 2    Narrative    Fast 8-10 hours, OK to drink water as needed during fast,  take medications per your provider's instructions.  Request patient fasting?->Yes  Fasting Instructions:->Fast 8-10 hours, OK to drink water as  needed during fast, take medications per your provider's  instruction.     If you have any questions or concerns, please don't hesitate to call.    Electronically Signed

## 2020-03-03 RX ORDER — EMPAGLIFLOZIN 25 MG/1
TABLET, FILM COATED ORAL
Qty: 90 TAB | Refills: 4 | Status: SHIPPED | OUTPATIENT
Start: 2020-03-03 | End: 2021-01-27 | Stop reason: SDUPTHER

## 2020-03-03 NOTE — TELEPHONE ENCOUNTER
Requested Prescriptions     Signed Prescriptions Disp Refills   • JARDIANCE 25 MG Tab 90 Tab 4     Sig: TAKE 1 TABLET DAILY     Authorizing Provider: REGULO SLAUGHTER A.P.R.N.

## 2020-03-11 ENCOUNTER — TELEPHONE (OUTPATIENT)
Dept: MEDICAL GROUP | Facility: PHYSICIAN GROUP | Age: 55
End: 2020-03-11

## 2020-03-11 RX ORDER — METFORMIN HYDROCHLORIDE 750 MG/1
750 TABLET, EXTENDED RELEASE ORAL DAILY
Qty: 90 TAB | Refills: 3 | Status: SHIPPED | OUTPATIENT
Start: 2020-03-11 | End: 2021-01-27 | Stop reason: SDUPTHER

## 2020-03-11 NOTE — TELEPHONE ENCOUNTER
New RX sent in for 750 XR once daily.  Please advise patient to be checking daily blood sugars and to let me know where they are running sometime early April .  Thanks  PATTI Finnegan

## 2020-03-11 NOTE — TELEPHONE ENCOUNTER
VOICEMAIL  1. Caller Name: Moe                      Call Back Number: [phone] (home)       2. Message: Patient states he was having heart palpitations at night on the metformin 750 mg BID so he cut back to once a day and they went away. He needs a refill. Please advise     3. Patient approves office to leave a detailed voicemail/MyChart message: N\A

## 2020-05-29 ENCOUNTER — OFFICE VISIT (OUTPATIENT)
Dept: URGENT CARE | Facility: PHYSICIAN GROUP | Age: 55
End: 2020-05-29

## 2020-05-29 ENCOUNTER — OFFICE VISIT (OUTPATIENT)
Dept: ENDOCRINOLOGY | Facility: MEDICAL CENTER | Age: 55
End: 2020-05-29
Payer: COMMERCIAL

## 2020-05-29 VITALS
HEIGHT: 72 IN | WEIGHT: 314 LBS | DIASTOLIC BLOOD PRESSURE: 80 MMHG | OXYGEN SATURATION: 95 % | SYSTOLIC BLOOD PRESSURE: 116 MMHG | HEART RATE: 77 BPM | BODY MASS INDEX: 42.53 KG/M2

## 2020-05-29 VITALS
DIASTOLIC BLOOD PRESSURE: 84 MMHG | HEART RATE: 71 BPM | WEIGHT: 313 LBS | OXYGEN SATURATION: 94 % | BODY MASS INDEX: 42.39 KG/M2 | RESPIRATION RATE: 18 BRPM | SYSTOLIC BLOOD PRESSURE: 130 MMHG | HEIGHT: 72 IN | TEMPERATURE: 97.8 F

## 2020-05-29 DIAGNOSIS — Z79.84 LONG TERM (CURRENT) USE OF ORAL HYPOGLYCEMIC DRUGS: ICD-10-CM

## 2020-05-29 DIAGNOSIS — Z02.4 ENCOUNTER FOR CDL (COMMERCIAL DRIVING LICENSE) EXAM: ICD-10-CM

## 2020-05-29 DIAGNOSIS — I10 ESSENTIAL HYPERTENSION: ICD-10-CM

## 2020-05-29 DIAGNOSIS — E78.5 HYPERLIPIDEMIA, UNSPECIFIED HYPERLIPIDEMIA TYPE: ICD-10-CM

## 2020-05-29 DIAGNOSIS — E11.9 TYPE 2 DIABETES MELLITUS WITHOUT COMPLICATION, WITHOUT LONG-TERM CURRENT USE OF INSULIN (HCC): ICD-10-CM

## 2020-05-29 LAB
HBA1C MFR BLD: 5.3 % (ref 0–5.6)
INT CON NEG: NORMAL
INT CON POS: NORMAL

## 2020-05-29 PROCEDURE — 83036 HEMOGLOBIN GLYCOSYLATED A1C: CPT | Performed by: INTERNAL MEDICINE

## 2020-05-29 PROCEDURE — 99204 OFFICE O/P NEW MOD 45 MIN: CPT | Performed by: INTERNAL MEDICINE

## 2020-05-29 PROCEDURE — 7100 PR DOT PHYSICAL: Performed by: PHYSICIAN ASSISTANT

## 2020-05-29 ASSESSMENT — FIBROSIS 4 INDEX
FIB4 SCORE: 0.81
FIB4 SCORE: 0.81

## 2020-05-29 NOTE — PROGRESS NOTES
"Chief Complaint:  Consult requested by PATTI Finnegan for initial evaluation of Type 2 Diabetes Mellitus    HPI:   Moe Car is a 54 y.o. male with Type 2 Diabetes Mellitus diagnosed in 2000.  He denies hospitalizations for DKA in the past.    His a1c was 8.7% on Feb but has now improved to an a1c of 5.3% on May 2020.  He used to have hyperglycemic symptoms such as polyuria and polydipsia.  But now these symptoms have resolved after being placed on Jardiance 25mg QD and Ozempic 1 mg weekly.  He is also on Metformin 750mg bid.   He has WILBERT but he cant use his CPAP due to policies of his [employer] insurance.  He reports fatigue and snoring.  He has lost 50 lbs though.     He monitors blood glucose  1 times per week. Blood glucose values range:Breakfast: 90, 100, 120           He denies hypoglycemic episodes.   He  denies hypoglycemic unawareness. He denies episodes of severe hypoglycemia requiring third party assistance.  He  is not wearing a medical alert bracelet or necklace.  He does not a glucagon emergency kit.    He denies attending diabetes education classes.  Diet: \"healthy\" diet  in general.    Diabetes Complications   He  denies history of retinopathy.  He denies laser eye surgery. Last eye exam: January 2018 (POCT exam in office).  He denies history of peripheral sensory neuropathy.  He denies numbness, tingling in both feet.  He denies history of foot sores.   He denies history of kidney damage.  He is on ACE inhibitor or ARB.   He denies history of coronary artery disease.  He  denies history of stroke and denies TIA.  He denies history of PAD.  He reports history of hyperlipidemia. He is on Pravastatin, he is tolerating it well      ROS:     CONS:     No fever, no chills, no weight loss, reports fatigue   EYES:      No diplopia, no blurry vision, no redness of eyes, no swelling of eyelids   ENT:    No hearing loss, No ear pain, No sore throat, no dysphagia, no neck swelling   CV:     " No chest pain, no palpitations, no claudication, no orthopnea, no PND   PULM:    No SOB, no cough, no hemoptysis, no wheezing    GI:   No nausea, no vomiting, no diarrhea, no constipation, no bloody stools   :  Passing urine well, no dysuria, no hematuria   ENDO:   No polyuria, no polydipsia, no heat intolerance, no cold intolerance   NEURO: No headaches, no dizziness, no convulsions, no tremors   MUSC:  No joint swellings, no arthralgias, no myalgias, no weakness   SKIN:   No rash, no ulcers, no dry skin   PSYCH:   No depression, no anxiety, no difficulty sleeping       Past Medical History:  Patient Active Problem List    Diagnosis Date Noted   • Cellulitis of right leg 07/23/2018   • Chronic idiopathic gout of left foot 12/19/2016   • Type 2 diabetes mellitus without complication ([facility]) 10/13/2016   • Body mass index (BMI) of 45.0-49.9 in adult ([facility]) 05/23/2012   • Vitamin D deficiency 07/29/2011   • Hypertension 03/02/2011   • Obstructive sleep apnea 12/02/2009   • Hyperlipidemia 10/21/2009       Past Surgical History:  Past Surgical History:   Procedure Laterality Date   • VASECTOMY          Allergies:  Crestor [rosuvastatin calcium]     Current Medications:    Current Outpatient Medications:   •  metFORMIN ER (GLUCOPHAGE XR) 750 MG TABLET SR 24 HR, Take 1 Tab by mouth every day., Disp: 90 Tab, Rfl: 3  •  JARDIANCE 25 MG Tab, TAKE 1 TABLET DAILY, Disp: 90 Tab, Rfl: 4  •  Semaglutide, 1 MG/DOSE, 2 MG/1.5ML Solution Pen-injector, Inject 1 Dose as instructed every 7 days., Disp: 4 PEN, Rfl: 6  •  allopurinol (ZYLOPRIM) 300 MG Tab, TAKE 1 TABLET DAILY, Disp: 90 Tab, Rfl: 3  •  Telmisartan-amLODIPine 40-10 MG Tab, TAKE 1 TABLET DAILY FOR HIGH BLOOD PRESSURE, Disp: 90 Tab, Rfl: 3  •  pravastatin (PRAVACHOL) 80 MG tablet, TAKE 1 TABLET DAILY, Disp: 90 Tab, Rfl: 3  •  metoprolol (TOPROL-XL) 200 MG XL tablet, TAKE 1 TABLET DAILY FOR HIGH BLOOD PRESSURE, Disp: 90 Tab, Rfl: 3  •  hydrochlorothiazide (MICROZIDE) 12.5 MG  capsule, TAKE 1 CAPSULE DAILY, Disp: 90 Cap, Rfl: 3  •  Multiple Vitamins-Minerals (MULTIVITAMIN ADULT PO), Take  by mouth., Disp: , Rfl:   •  aspirin EC (ECOTRIN) 81 MG Tablet Delayed Response, Take 81 mg by mouth every day., Disp: , Rfl:   •  acetaminophen (TYLENOL) 500 MG Tab, Take 500-1,000 mg by mouth every 6 hours as needed., Disp: , Rfl:   •  indomethacin (INDOCIN) 50 MG Cap, Take 1 Cap by mouth 3 times a day. For gout, stop when symptoms decrease  Indications: Acute Joint Inflammation in Gout, Disp: 270 Cap, Rfl: 1  •  Cholecalciferol (VITAMIN D-3) 5000 UNITS TABS, Take  by mouth., Disp: , Rfl:   •  Coenzyme Q10 (CO Q 10 PO), Take  by mouth., Disp: , Rfl:   •  acyclovir (ZOVIRAX) 5 % OINT, Apply  to affected area(s) every 3 hours. Apply after cleansing, Disp: 60 g, Rfl: 3    Social History:  Social History     Socioeconomic History   • Marital status:      Spouse name: Not on file   • Number of children: Not on file   • Years of education: Not on file   • Highest education level: Not on file   Occupational History   • Not on file   Social Needs   • Financial resource strain: Not on file   • Food insecurity     Worry: Not on file     Inability: Not on file   • Transportation needs     Medical: Not on file     Non-medical: Not on file   Tobacco Use   • Smoking status: Never Smoker   • Smokeless tobacco: Current User     Types: Chew   Substance and Sexual Activity   • Alcohol use: Yes     Alcohol/week: 1.5 oz     Types: 3 Cans of beer per week     Comment: day   • Drug use: No   • Sexual activity: Yes     Partners: Female     Birth control/protection: Surgical   Lifestyle   • Physical activity     Days per week: Not on file     Minutes per session: Not on file   • Stress: Not on file   Relationships   • Social connections     Talks on phone: Not on file     Gets together: Not on file     Attends Je[employer]'s witness service: Not on file     Active member of club or organization: Not on file     Attends meetings of  clubs or organizations: Not on file     Relationship status: Not on file   • Intimate partner violence     Fear of current or ex partner: Not on file     Emotionally abused: Not on file     Physically abused: Not on file     Forced sexual activity: Not on file   Other Topics Concern   • Not on file   Social History Narrative   • Not on file        Family History:   Family History   Problem Relation Age of Onset   • Hypertension Mother    • Heart Disease Mother         cad  at 60   • Hypertension Father    • Heart Disease Paternal Grandfather        PHYSICAL EXAM:   Vital signs: /80   Pulse 77   Ht 1.829 m (6')   Wt (!) 142.4 kg (314 lb)   SpO2 95%   BMI 42.59 kg/m²   GENERAL: Very obese gentleman  in no apparent distress.   EYE: No ocular and eyelid asymmetry, Anicteric sclerae,  PERRL, No exophthalmos or lidlag  HENT: Hearing grossly intact, Normocephalic, atraumatic. Pink, moist mucous membranes, No exudate  NECK: Supple. Trachea midline. thyroid is normal in size without nodules or tenderness  CARDIOVASCULAR: Regular rate and rhythm. No murmurs, rubs, or gallops.   LUNGS: Clear to auscultation bilaterally   ABDOMEN: Soft, nontender with positive bowel sounds.   EXTREMITIES: No clubbing, cyanosis, or edema.   NEUROLOGICAL: Cranial nerves II-XII are grossly intact   Symmetric reflexes at the patella no proximal muscle weakness, No visible tremor with both outstretched hands  LYMPH: No cervical, supraclavicular,  adenopathy palpated.   SKIN: No rash, few non infected excoriated lesions on both legs. Turgor is normal.  FOOT: Normal sensation to monofilament testing, normal pulses, no ulcers.  Normal Vibration quantitative sensation test.    Labs:  Lab Results   Component Value Date/Time    HBA1C 5.3 2020 0729    AVGLUC 203 2020 0619       Lab Results   Component Value Date/Time    WBC 7.2 2020 06:19 AM    RBC 5.42 2020 06:19 AM    HEMOGLOBIN 17.2 2020 06:19 AM    MCV 96.1  02/07/2020 06:19 AM    MCH 31.7 02/07/2020 06:19 AM    MCHC 33.0 (L) 02/07/2020 06:19 AM    RDW 43.9 02/07/2020 06:19 AM    MPV 10.6 02/07/2020 06:19 AM       Lab Results   Component Value Date/Time    SODIUM 138 02/07/2020 06:19 AM    POTASSIUM 4.5 02/07/2020 06:19 AM    CHLORIDE 101 02/07/2020 06:19 AM    CO2 29 02/07/2020 06:19 AM    ANION 8.0 02/07/2020 06:19 AM    GLUCOSE 157 (H) 02/07/2020 06:19 AM    BUN 20 02/07/2020 06:19 AM    CREATININE 0.78 02/07/2020 06:19 AM    CREATININE 1.0 03/26/2009 08:55 AM    CALCIUM 9.7 02/07/2020 06:19 AM    ASTSGOT 27 02/07/2020 06:19 AM    ALTSGPT 48 02/07/2020 06:19 AM    TBILIRUBIN 0.7 02/07/2020 06:19 AM    ALBUMIN 4.7 02/07/2020 06:19 AM    TOTPROTEIN 7.2 02/07/2020 06:19 AM    GLOBULIN 2.5 02/07/2020 06:19 AM    AGRATIO 1.9 02/07/2020 06:19 AM       Lab Results   Component Value Date/Time    CHOLSTRLTOT 227 (H) 02/07/2020 0619    TRIGLYCERIDE 474 (H) 02/07/2020 0619    HDL 41 02/07/2020 0619    LDL see below 02/07/2020 0619       Lab Results   Component Value Date/Time    MALBCRT 32 (H) 02/07/2020 06:19 AM    MICROALBUR 2.3 02/07/2020 06:19 AM        Lab Results   Component Value Date/Time    TSHULTRASEN 1.840 03/02/2011 1143     No results found for: FREEDIR  No results found for: FREET3  No results found for: THYSTIMIG      ASSESSMENT/PLAN:     1. Type 2 diabetes mellitus without complication, without long-term current use of insulin (HCC)  Previously uncontrolled now with significantly improved control.  Reviewed pathogenesis of type 2 diabetes and the importance of good glycemic control and prevention of long-term vascular complications  Continue Metformin, Synjardy and Ozempic at present or current doses  Reviewed the side effects of SGLT2 and GLP-1 analog therapy  Advised patient to continue with diet and exercise and plate method of eating  Encouraged him to use his CPAP  I am referring him to ophthalmology for a dilated eye exam  Foot exam was completed today  We  will plan for follow-up in 3 months with a repeat of his A1c at [facility] based on his insurance      2. Hyperlipidemia, unspecified hyperlipidemia type  Uncontrolled at baseline  I expect his lipids to be better  Continue current pravastatin  Follow low-fat diet  We will repeat fasting lipids at [facility] in 3 months    3. Essential hypertension  Well-controlled  Continue current ARB calcium channel blocker combo and Toprol-XL and HCTZ  We will check urine microalbumin with next labs    4. Long term (current) use of oral hypoglycemic drugs  Patient is on multiple oral agents and a GLP-1 for type 2 diabetes management      Return in about 3 months (around 8/29/2020).      Thank you kindly for allowing me to participate in the diabetes care plan for this patient.    Edgar Sánchez MD, FACE, ECNU  05/29/20    CC:   PATTI Finnegan

## 2020-05-29 NOTE — PROGRESS NOTES
Subjective:   Moe Car is a 54 y.o. 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   Patient endorses history of snoring and having a sleep study several years ago where he says only finding was that he snored but not requiring CPAP recently  and does not have one at home currently. Was treated for it before and last sleep study over 10 years ago, notes much improvement since then. Patient denies any daytime drowsiness or fatigue while driving or any other associated symptoms.  He is in close follow-up with his endocrinologist and has lost 50 pounds over the last 3 months and has gotten his A1c under well control with most recent being 5.3.  Patient reports he feels excellent after losing 50 pounds.  PMH:  has a past medical history of Hyperlipidemia (10/21/2009), Hypertension (10/21/2009), and Obstructive sleep apnea (12/2/2009).  MEDS:   Current Outpatient Medications:   •  metFORMIN ER (GLUCOPHAGE XR) 750 MG TABLET SR 24 HR, Take 1 Tab by mouth every day., Disp: 90 Tab, Rfl: 3  •  JARDIANCE 25 MG Tab, TAKE 1 TABLET DAILY, Disp: 90 Tab, Rfl: 4  •  Semaglutide, 1 MG/DOSE, 2 MG/1.5ML Solution Pen-injector, Inject 1 Dose as instructed every 7 days., Disp: 4 PEN, Rfl: 6  •  allopurinol (ZYLOPRIM) 300 MG Tab, TAKE 1 TABLET DAILY, Disp: 90 Tab, Rfl: 3  •  Telmisartan-amLODIPine 40-10 MG Tab, TAKE 1 TABLET DAILY FOR HIGH BLOOD PRESSURE, Disp: 90 Tab, Rfl: 3  •  pravastatin (PRAVACHOL) 80 MG tablet, TAKE 1 TABLET DAILY, Disp: 90 Tab, Rfl: 3  •  metoprolol (TOPROL-XL) 200 MG XL tablet, TAKE 1 TABLET DAILY FOR HIGH BLOOD PRESSURE, Disp: 90 Tab, Rfl: 3  •  hydrochlorothiazide (MICROZIDE) 12.5 MG capsule, TAKE 1 CAPSULE DAILY, Disp: 90 Cap, Rfl: 3  •  Multiple Vitamins-Minerals (MULTIVITAMIN ADULT PO), Take  by mouth., Disp: , Rfl:   •  aspirin EC (ECOTRIN) 81 MG Tablet Delayed Response, Take 81 mg by mouth  every day., Disp: , Rfl:   •  acetaminophen (TYLENOL) 500 MG Tab, Take 500-1,000 mg by mouth every 6 hours as needed., Disp: , Rfl:   •  indomethacin (INDOCIN) 50 MG Cap, Take 1 Cap by mouth 3 times a day. For gout, stop when symptoms decrease  Indications: Acute Joint Inflammation in Gout, Disp: 270 Cap, Rfl: 1  •  Cholecalciferol (VITAMIN D-3) 5000 UNITS TABS, Take  by mouth., Disp: , Rfl:   •  Coenzyme Q10 (CO Q 10 PO), Take  by mouth., Disp: , Rfl:   •  acyclovir (ZOVIRAX) 5 % OINT, Apply  to affected area(s) every 3 hours. Apply after cleansing, Disp: 60 g, Rfl: 3  ALLERGIES:   Allergies   Allergen Reactions   • Crestor [Rosuvastatin Calcium]      Heart palpitations     SURGHX:   Past Surgical History:   Procedure Laterality Date   • VASECTOMY       SOCHX:  reports that he has never smoked. His smokeless tobacco use includes chew. He reports current alcohol use of about 1.5 oz of alcohol per week. He reports that he does not use drugs.  FH: Reviewed with patient, not pertinent to this visit.       ROS  Please see scanned documentation   Objective:   /84   Pulse 71   Temp 36.6 °C (97.8 °F) (Temporal)   Resp 18   Ht 1.829 m (6')   Wt (!) 142 kg (313 lb)   SpO2 94%   BMI 42.45 kg/m²   Physical Exam  Please see scanned documentation    Assessment/Plan:   Assessment    1. Encounter for CDL (commercial driving license) exam  Patient certified for 1 year at this time given that he is well controlled on diabetic medication as well as hypertensive medication.  Encourage close follow-up with his primary care.    Please note that this dictation was created using voice recognition software. I have made every reasonable attempt to correct obvious errors, but I expect that there are errors of grammar and possibly content that I did not discover before finalizing the note.    Kurt Alexandre PA-C

## 2020-06-29 ENCOUNTER — TELEPHONE (OUTPATIENT)
Dept: MEDICAL GROUP | Facility: PHYSICIAN GROUP | Age: 55
End: 2020-06-29

## 2020-06-30 NOTE — TELEPHONE ENCOUNTER
Due to the recall, we will need to switch back to regular release metformin.  We would take 1000 mg twice daily.  Where would you like me to send the prescription to?  Are you currently seeing Dr. Sánchez?  If so, he should be taking care of any diabetes meds.  Let me know.  DOE Finnegan.

## 2020-06-30 NOTE — TELEPHONE ENCOUNTER
Phone Number Called: [phone] (home)       Call outcome: Spoke to patient regarding message below.    Message: He will get in contact with Dr Sánchez's office

## 2020-06-30 NOTE — TELEPHONE ENCOUNTER
VOICEMAIL  1. Caller Name: Moe                      Call Back Number: [phone] (home)       2. Message: Patient is out of his Metformin 750 ER because you had him double up at one time but then he was having palpitations so he went back to one a day. Also his medication is one that was recalled so he will need something different.  Please advise     3. Patient approves office to leave a detailed voicemail/MyChart message: N\A

## 2020-11-13 ENCOUNTER — TELEPHONE (OUTPATIENT)
Dept: MEDICAL GROUP | Facility: PHYSICIAN GROUP | Age: 55
End: 2020-11-13

## 2020-11-13 NOTE — TELEPHONE ENCOUNTER
VOICEMAIL  1. Caller Name: Moe Car                      Call Back Number: [phone] (home)     2. Message: Pt stated the infection has returned on his leg and would like to get another rond of Antibiotics.  Please advise    3. Patient approves office to leave a detailed voicemail/MyChart message: N\A

## 2020-11-14 RX ORDER — SULFAMETHOXAZOLE AND TRIMETHOPRIM 800; 160 MG/1; MG/1
1 TABLET ORAL 2 TIMES DAILY
Qty: 20 TAB | Refills: 0 | Status: SHIPPED | OUTPATIENT
Start: 2020-11-14 | End: 2021-01-27

## 2020-11-14 NOTE — TELEPHONE ENCOUNTER
Please let patient know that I have sent an antibiotic that he will take for 10 days twice/day to [facility] on [facility].  Please have patient do a virtual update visit in the next 14 days so I know it is improving.  DOE Finnegan.

## 2020-11-16 NOTE — TELEPHONE ENCOUNTER
Phone Number Called: [phone] (home)       Call outcome: Did not leave a detailed message. Requested patient to call back.    Message: Please let patient know that I have sent an antibiotic that he will take for 10 days twice/day to [facility] on [address].  Please have patient do a virtual update visit in the next 14 days so I know it is improving.  DOE Finnegan.

## 2020-11-17 NOTE — TELEPHONE ENCOUNTER
Pt has been advised of Pearl's message and has scheduled a visit on 12/01/2020. Unable to do a virtual visit no [email or web address] or smart phone or computer.

## 2020-11-19 DIAGNOSIS — I10 ESSENTIAL HYPERTENSION: ICD-10-CM

## 2020-11-19 DIAGNOSIS — E78.5 HYPERLIPIDEMIA, UNSPECIFIED HYPERLIPIDEMIA TYPE: ICD-10-CM

## 2020-11-19 DIAGNOSIS — M1A.0720 IDIOPATHIC CHRONIC GOUT, LEFT ANKLE AND FOOT, WITHOUT TOPHUS (TOPHI): ICD-10-CM

## 2020-11-19 RX ORDER — TELMISARTAN AND AMLODIPINE 10; 40 MG/1; MG/1
1 TABLET ORAL DAILY
Qty: 90 TAB | Refills: 0 | Status: SHIPPED | OUTPATIENT
Start: 2020-11-19 | End: 2021-01-27 | Stop reason: SDUPTHER

## 2020-11-19 RX ORDER — METOPROLOL SUCCINATE 200 MG/1
200 TABLET, EXTENDED RELEASE ORAL DAILY
Qty: 90 TAB | Refills: 0 | Status: SHIPPED | OUTPATIENT
Start: 2020-11-19 | End: 2021-01-27 | Stop reason: SDUPTHER

## 2020-11-19 RX ORDER — ALLOPURINOL 300 MG/1
TABLET ORAL
Qty: 90 TAB | Refills: 0 | Status: SHIPPED | OUTPATIENT
Start: 2020-11-19 | End: 2021-01-27 | Stop reason: SDUPTHER

## 2020-11-19 RX ORDER — HYDROCHLOROTHIAZIDE 12.5 MG/1
CAPSULE, GELATIN COATED ORAL
Qty: 90 CAP | Refills: 0 | Status: SHIPPED | OUTPATIENT
Start: 2020-11-19 | End: 2021-01-27 | Stop reason: SDUPTHER

## 2020-11-19 RX ORDER — PRAVASTATIN SODIUM 80 MG/1
TABLET ORAL
Qty: 90 TAB | Refills: 0 | Status: SHIPPED | OUTPATIENT
Start: 2020-11-19 | End: 2021-01-27 | Stop reason: SDUPTHER

## 2020-11-19 NOTE — TELEPHONE ENCOUNTER
Requested Prescriptions     Pending Prescriptions Disp Refills   • Telmisartan-amLODIPine 40-10 MG Tab 90 Tab 0     Sig: Take 1 Tab by mouth every day. TAKE 1 TABLET DAILY FOR HIGH BLOOD PRESSURE     Signed Prescriptions Disp Refills   • allopurinol (ZYLOPRIM) 300 MG Tab 90 Tab 0     Sig: TAKE 1 TABLET DAILY     Authorizing Provider: REGULO SLAUGHTER   • pravastatin (PRAVACHOL) 80 MG tablet 90 Tab 0     Sig: TAKE 1 TABLET DAILY     Authorizing Provider: REGULO SLAUGHTER   • metoprolol (TOPROL-XL) 200 MG XL tablet 90 Tab 0     Sig: Take 1 Tab by mouth every day. TAKE 1 TABLET DAILY FOR HIGH BLOOD PRESSURE     Authorizing Provider: REGULO SLAUGHTER   • hydrochlorothiazide (MICROZIDE) 12.5 MG capsule 90 Cap 0     Sig: TAKE 1 CAPSULE DAILY     Authorizing Provider: REGULO SLAUGHTER     Upcoming appointment December.  PATTI Finnegan

## 2021-01-11 NOTE — TELEPHONE ENCOUNTER
Requested Prescriptions     Signed Prescriptions Disp Refills   • allopurinol (ZYLOPRIM) 300 MG Tab 90 Tab 3     Sig: TAKE 1 TABLET DAILY     Authorizing Provider: REGULO SLAUGHTER A.P.R.N.    
Was the patient seen in the last year in this department? Yes     Does patient have an active prescription for medications requested? No     Received Request Via: Pharmacy  
Self

## 2021-01-27 ENCOUNTER — OFFICE VISIT (OUTPATIENT)
Dept: MEDICAL GROUP | Facility: PHYSICIAN GROUP | Age: 56
End: 2021-01-27
Payer: COMMERCIAL

## 2021-01-27 VITALS
SYSTOLIC BLOOD PRESSURE: 130 MMHG | HEIGHT: 72 IN | WEIGHT: 315 LBS | DIASTOLIC BLOOD PRESSURE: 70 MMHG | BODY MASS INDEX: 42.66 KG/M2 | OXYGEN SATURATION: 93 % | HEART RATE: 84 BPM | RESPIRATION RATE: 18 BRPM | TEMPERATURE: 97 F

## 2021-01-27 DIAGNOSIS — E55.9 VITAMIN D DEFICIENCY: ICD-10-CM

## 2021-01-27 DIAGNOSIS — E11.9 TYPE 2 DIABETES MELLITUS WITHOUT COMPLICATION, WITHOUT LONG-TERM CURRENT USE OF INSULIN (HCC): ICD-10-CM

## 2021-01-27 DIAGNOSIS — Z12.11 ENCOUNTER FOR SCREENING FECAL OCCULT BLOOD TESTING: ICD-10-CM

## 2021-01-27 DIAGNOSIS — E78.5 HYPERLIPIDEMIA, UNSPECIFIED HYPERLIPIDEMIA TYPE: ICD-10-CM

## 2021-01-27 DIAGNOSIS — M1A.0720 IDIOPATHIC CHRONIC GOUT OF LEFT FOOT WITHOUT TOPHUS: ICD-10-CM

## 2021-01-27 DIAGNOSIS — M1A.0720 IDIOPATHIC CHRONIC GOUT, LEFT ANKLE AND FOOT, WITHOUT TOPHUS (TOPHI): ICD-10-CM

## 2021-01-27 DIAGNOSIS — I10 ESSENTIAL HYPERTENSION: ICD-10-CM

## 2021-01-27 PROBLEM — L03.115 CELLULITIS OF RIGHT LEG: Status: RESOLVED | Noted: 2018-07-23 | Resolved: 2021-01-27

## 2021-01-27 PROCEDURE — 99396 PREV VISIT EST AGE 40-64: CPT | Performed by: NURSE PRACTITIONER

## 2021-01-27 RX ORDER — EMPAGLIFLOZIN 25 MG/1
1 TABLET, FILM COATED ORAL DAILY
Qty: 90 TAB | Refills: 4 | Status: SHIPPED | OUTPATIENT
Start: 2021-01-27 | End: 2021-08-12 | Stop reason: SDUPTHER

## 2021-01-27 RX ORDER — METOPROLOL SUCCINATE 200 MG/1
200 TABLET, EXTENDED RELEASE ORAL DAILY
Qty: 90 TAB | Refills: 0 | Status: SHIPPED | OUTPATIENT
Start: 2021-01-27 | End: 2021-05-19 | Stop reason: SDUPTHER

## 2021-01-27 RX ORDER — ACYCLOVIR 50 MG/G
OINTMENT TOPICAL
Qty: 60 G | Refills: 3 | Status: SHIPPED | OUTPATIENT
Start: 2021-01-27 | End: 2024-01-24

## 2021-01-27 RX ORDER — ALLOPURINOL 300 MG/1
TABLET ORAL
Qty: 90 TAB | Refills: 0 | Status: SHIPPED | OUTPATIENT
Start: 2021-01-27 | End: 2021-05-19 | Stop reason: SDUPTHER

## 2021-01-27 RX ORDER — PRAVASTATIN SODIUM 80 MG/1
TABLET ORAL
Qty: 90 TAB | Refills: 0 | Status: SHIPPED | OUTPATIENT
Start: 2021-01-27 | End: 2021-05-19 | Stop reason: SDUPTHER

## 2021-01-27 RX ORDER — METFORMIN HYDROCHLORIDE 750 MG/1
750 TABLET, EXTENDED RELEASE ORAL DAILY
Qty: 90 TAB | Refills: 3 | Status: SHIPPED | OUTPATIENT
Start: 2021-01-27 | End: 2021-08-12 | Stop reason: SDUPTHER

## 2021-01-27 RX ORDER — HYDROCHLOROTHIAZIDE 12.5 MG/1
CAPSULE, GELATIN COATED ORAL
Qty: 90 CAP | Refills: 0 | Status: SHIPPED | OUTPATIENT
Start: 2021-01-27 | End: 2021-05-19 | Stop reason: SDUPTHER

## 2021-01-27 RX ORDER — TELMISARTAN AND AMLODIPINE 10; 40 MG/1; MG/1
1 TABLET ORAL DAILY
Qty: 90 TAB | Refills: 0 | Status: SHIPPED | OUTPATIENT
Start: 2021-01-27 | End: 2021-02-09 | Stop reason: SDUPTHER

## 2021-01-27 ASSESSMENT — PATIENT HEALTH QUESTIONNAIRE - PHQ9: CLINICAL INTERPRETATION OF PHQ2 SCORE: 0

## 2021-01-27 ASSESSMENT — FIBROSIS 4 INDEX: FIB4 SCORE: 0.82

## 2021-01-27 NOTE — PROGRESS NOTES
Chief Complaint   Patient presents with   • Annual Exam   • Medication Refill       Subjective:   Moe Car is a 55 y.o. male here today for annual preventative exam.    Body mass index (BMI) of 45.0-49.9 in adult  BMI 44.76  No exercise, reports covid stress.     Type 2 diabetes mellitus without complication (CMS-HCC)  Taking metformin, jardiance, semaglutide, ozempic. Due for labs.  Not checking blood sugars regularly. Weight is up, 100% compliance on meds.  Will check labs.   Due for retinal exam .      Chronic idiopathic gout of left foot  No reported gout flares.  Taking allopurinol daily at 300 mg.     Hyperlipidemia  Pravastatin daily.  Due for labs. Weight is up.  No exercise reported.     Vitamin D deficiency  takiing 3000 vitamin D daily.     Hypertension  bp 130/70 today.  Taking telmisartan/amlodipine, metoprolol daily. Also on HCTZ.  Denies chest pain, shortness of breath, visual changes, ankle edema.  No stroke like symptoms reported.      Due for retinal exam, colon cancer screening:  FIT kit given today    Current medicines (including changes today)  Current Outpatient Medications   Medication Sig Dispense Refill   • Semaglutide, 1 MG/DOSE, 2 MG/1.5ML Solution Pen-injector Inject 1 mg under the skin every 7 days. 4 Each 6   • metFORMIN ER (GLUCOPHAGE XR) 750 MG TABLET SR 24 HR Take 1 Tab by mouth every day. 90 Tab 3   • Empagliflozin (JARDIANCE) 25 MG Tab Take 1 Tab by mouth every day. 90 Tab 4   • allopurinol (ZYLOPRIM) 300 MG Tab TAKE 1 TABLET DAILY 90 Tab 0   • hydrochlorothiazide (MICROZIDE) 12.5 MG capsule TAKE 1 CAPSULE DAILY 90 Cap 0   • metoprolol (TOPROL-XL) 200 MG XL tablet Take 1 Tab by mouth every day. TAKE 1 TABLET DAILY FOR HIGH BLOOD PRESSURE 90 Tab 0   • pravastatin (PRAVACHOL) 80 MG tablet TAKE 1 TABLET DAILY 90 Tab 0   • Telmisartan-amLODIPine 40-10 MG Tab Take 1 Tab by mouth every day. TAKE 1 TABLET DAILY FOR HIGH BLOOD PRESSURE 90 Tab 0   • acyclovir (ZOVIRAX) 5 %  Ointment Apply to skin as directed qid 60 g 3   • Multiple Vitamins-Minerals (MULTIVITAMIN ADULT PO) Take  by mouth.     • aspirin EC (ECOTRIN) 81 MG Tablet Delayed Response Take 81 mg by mouth every day.     • acetaminophen (TYLENOL) 500 MG Tab Take 500-1,000 mg by mouth every 6 hours as needed.     • indomethacin (INDOCIN) 50 MG Cap Take 1 Cap by mouth 3 times a day. For gout, stop when symptoms decrease  Indications: Acute Joint Inflammation in Gout 270 Cap 1   • Cholecalciferol (VITAMIN D-3) 5000 UNITS TABS Take  by mouth.     • Coenzyme Q10 (CO Q 10 PO) Take  by mouth.       No current facility-administered medications for this visit.      He  has a past medical history of Hyperlipidemia (10/21/2009), Hypertension (10/21/2009), and Obstructive sleep apnea (12/2/2009).    ROS as stated in hpi  No chest pain, no shortness of breath, no abdominal pain       Objective:     /70 (BP Location: Left arm, Patient Position: Sitting, BP Cuff Size: Adult)   Pulse 84   Temp 36.1 °C (97 °F) (Temporal)   Resp 18   Ht 1.829 m (6')   Wt (!) 149.7 kg (330 lb)   SpO2 93%  Body mass index is 44.76 kg/m². weight up 18 pounds.   Physical Exam:  Constitutional: Alert, no distress.  Skin: Warm, dry, good turgor,no cyanosis, no rashes in visible areas.  Eye: Equal, round and reactive, conjunctiva clear, lids normal.  Neck: Trachea midline, no masses, no obvious thyroid enlargement.. No cervical or supraclavicular lymphadenopathy. Range of motion within normal limits.  Neuro: Cranial nerves 2-12 grossly intact.  No sensory deficit.  Respiratory: Unlabored respiratory effort, lungs clear to auscultation, no wheezes, no ronchi.  Cardiovascular: Normal S1, S2, no murmur, no edema.no carotid bruit appreciated  Psych: Alert and oriented x3, normal affect and mood and judgement.        Assessment and Plan:   The following treatment plan was discussed    1. Idiopathic chronic gout, left ankle and foot, without tophus  (tophi)  Chronic, ongoing. Stable.  Refill today.   - allopurinol (ZYLOPRIM) 300 MG Tab; TAKE 1 TABLET DAILY  Dispense: 90 Tab; Refill: 0  - URIC ACID; Future    2. Essential hypertension  Chronic, ongoing. At goal.  Continue meds.  Refills today.  Encouraged daily walking and weight loss   - hydrochlorothiazide (MICROZIDE) 12.5 MG capsule; TAKE 1 CAPSULE DAILY  Dispense: 90 Cap; Refill: 0  - metoprolol (TOPROL-XL) 200 MG XL tablet; Take 1 Tab by mouth every day. TAKE 1 TABLET DAILY FOR HIGH BLOOD PRESSURE  Dispense: 90 Tab; Refill: 0  - Telmisartan-amLODIPine 40-10 MG Tab; Take 1 Tab by mouth every day. TAKE 1 TABLET DAILY FOR HIGH BLOOD PRESSURE  Dispense: 90 Tab; Refill: 0  - CBC WITH DIFFERENTIAL; Future  - Comp Metabolic Panel; Future    3. Hyperlipidemia, unspecified hyperlipidemia type  Chornic, ongoing. Due for labs.  Refill today.  Encouraged weight loss, diet and exercise.   - pravastatin (PRAVACHOL) 80 MG tablet; TAKE 1 TABLET DAILY  Dispense: 90 Tab; Refill: 0  - Lipid Profile; Future    4. Body mass index (BMI) of 45.0-49.9 in adult (HCC)  Chronic, ongoing. Worsening.  Reviewed dietary, and lifestyle management.  Monitor and follow.     5. Type 2 diabetes mellitus without complication, without long-term current use of insulin (HCC)  Chronic, ongoing. Due for labs.  Continue with current meds.  Continue to reach out to endocrinology.  Return in 3 months for A1c check in office.  Encouraged improved lifestyle controls.  Monitor and follow.   - HEMOGLOBIN A1C; Future  - MICROALBUMIN CREAT RATIO URINE; Future    6. Idiopathic chronic gout of left foot without tophus  See #1    7. Vitamin D deficiency  Chronic, ongoing. Continue with supplementation. Labs ordered.   - VITAMIN D,25 HYDROXY; Future    8. Encounter for screening fecal occult blood testing  Due for screening.  Kit provided.  Monitor and follow.   - OCCULT BLOOD FECES IMMUNOASSAY; Future  Discussion today about general wellness and lifestyle  habits:   *engage in regular physical and social activities   *prevent falls and reduce trip hazards, using ambulatory aids, hearing and vision testing if appropriate   *skin care, including sunscreen      Followup: Return in about 3 months (around 4/27/2021) for a1c check, weight management.         Educated in proper administration of medication(s) ordered today including safety, possible SE, risks, benefits, rationale and alternatives to therapy.     Please note that this dictation was created using voice recognition software. I have made every reasonable attempt to correct obvious errors, but I expect that there are errors of grammar and possibly content that I did not discover before finalizing the note.

## 2021-01-27 NOTE — ASSESSMENT & PLAN NOTE
Taking metformin, jardiance, semaglutide, ozempic. Due for labs.  Not checking blood sugars regularly. Weight is up, 100% compliance on meds.  Will check labs.   Due for retinal exam .

## 2021-01-27 NOTE — ASSESSMENT & PLAN NOTE
bp 130/70 today.  Taking telmisartan/amlodipine, metoprolol daily. Also on HCTZ.  Denies chest pain, shortness of breath, visual changes, ankle edema.  No stroke like symptoms reported.

## 2021-02-04 ENCOUNTER — HOSPITAL ENCOUNTER (OUTPATIENT)
Facility: MEDICAL CENTER | Age: 56
End: 2021-02-04
Attending: NURSE PRACTITIONER
Payer: COMMERCIAL

## 2021-02-04 ENCOUNTER — HOSPITAL ENCOUNTER (OUTPATIENT)
Dept: LAB | Facility: MEDICAL CENTER | Age: 56
End: 2021-02-04
Attending: NURSE PRACTITIONER
Payer: COMMERCIAL

## 2021-02-04 DIAGNOSIS — E11.9 TYPE 2 DIABETES MELLITUS WITHOUT COMPLICATION, WITHOUT LONG-TERM CURRENT USE OF INSULIN (HCC): ICD-10-CM

## 2021-02-04 DIAGNOSIS — M1A.0720 IDIOPATHIC CHRONIC GOUT, LEFT ANKLE AND FOOT, WITHOUT TOPHUS (TOPHI): ICD-10-CM

## 2021-02-04 DIAGNOSIS — E55.9 VITAMIN D DEFICIENCY: ICD-10-CM

## 2021-02-04 DIAGNOSIS — I10 ESSENTIAL HYPERTENSION: ICD-10-CM

## 2021-02-04 DIAGNOSIS — E78.5 HYPERLIPIDEMIA, UNSPECIFIED HYPERLIPIDEMIA TYPE: ICD-10-CM

## 2021-02-04 LAB
BASOPHILS # BLD AUTO: 0.3 % (ref 0–1.8)
BASOPHILS # BLD: 0.02 K/UL (ref 0–0.12)
EOSINOPHIL # BLD AUTO: 0.15 K/UL (ref 0–0.51)
EOSINOPHIL NFR BLD: 2.1 % (ref 0–6.9)
ERYTHROCYTE [DISTWIDTH] IN BLOOD BY AUTOMATED COUNT: 43.1 FL (ref 35.9–50)
EST. AVERAGE GLUCOSE BLD GHB EST-MCNC: 123 MG/DL
HBA1C MFR BLD: 5.9 % (ref 0–5.6)
HCT VFR BLD AUTO: 51.2 % (ref 42–52)
HGB BLD-MCNC: 17.3 G/DL (ref 14–18)
IMM GRANULOCYTES # BLD AUTO: 0.02 K/UL (ref 0–0.11)
IMM GRANULOCYTES NFR BLD AUTO: 0.3 % (ref 0–0.9)
LYMPHOCYTES # BLD AUTO: 2.69 K/UL (ref 1–4.8)
LYMPHOCYTES NFR BLD: 37.8 % (ref 22–41)
MCH RBC QN AUTO: 32 PG (ref 27–33)
MCHC RBC AUTO-ENTMCNC: 33.8 G/DL (ref 33.7–35.3)
MCV RBC AUTO: 94.6 FL (ref 81.4–97.8)
MONOCYTES # BLD AUTO: 0.88 K/UL (ref 0–0.85)
MONOCYTES NFR BLD AUTO: 12.4 % (ref 0–13.4)
NEUTROPHILS # BLD AUTO: 3.36 K/UL (ref 1.82–7.42)
NEUTROPHILS NFR BLD: 47.1 % (ref 44–72)
NRBC # BLD AUTO: 0 K/UL
NRBC BLD-RTO: 0 /100 WBC
PLATELET # BLD AUTO: 286 K/UL (ref 164–446)
PMV BLD AUTO: 10.2 FL (ref 9–12.9)
RBC # BLD AUTO: 5.41 M/UL (ref 4.7–6.1)
WBC # BLD AUTO: 7.1 K/UL (ref 4.8–10.8)

## 2021-02-04 PROCEDURE — 80061 LIPID PANEL: CPT

## 2021-02-04 PROCEDURE — 36415 COLL VENOUS BLD VENIPUNCTURE: CPT

## 2021-02-04 PROCEDURE — 83036 HEMOGLOBIN GLYCOSYLATED A1C: CPT

## 2021-02-04 PROCEDURE — 80053 COMPREHEN METABOLIC PANEL: CPT

## 2021-02-04 PROCEDURE — 85025 COMPLETE CBC W/AUTO DIFF WBC: CPT

## 2021-02-04 PROCEDURE — 82043 UR ALBUMIN QUANTITATIVE: CPT

## 2021-02-04 PROCEDURE — 82570 ASSAY OF URINE CREATININE: CPT

## 2021-02-04 PROCEDURE — 84550 ASSAY OF BLOOD/URIC ACID: CPT

## 2021-02-04 PROCEDURE — 82274 ASSAY TEST FOR BLOOD FECAL: CPT

## 2021-02-04 PROCEDURE — 82306 VITAMIN D 25 HYDROXY: CPT

## 2021-02-05 DIAGNOSIS — Z12.11 ENCOUNTER FOR SCREENING FECAL OCCULT BLOOD TESTING: ICD-10-CM

## 2021-02-05 LAB
25(OH)D3 SERPL-MCNC: 55 NG/ML (ref 30–100)
ALBUMIN SERPL BCP-MCNC: 4.6 G/DL (ref 3.2–4.9)
ALBUMIN/GLOB SERPL: 1.5 G/DL
ALP SERPL-CCNC: 81 U/L (ref 30–99)
ALT SERPL-CCNC: 43 U/L (ref 2–50)
ANION GAP SERPL CALC-SCNC: 13 MMOL/L (ref 7–16)
AST SERPL-CCNC: 29 U/L (ref 12–45)
BILIRUB SERPL-MCNC: 0.6 MG/DL (ref 0.1–1.5)
BUN SERPL-MCNC: 18 MG/DL (ref 8–22)
CALCIUM SERPL-MCNC: 9.7 MG/DL (ref 8.5–10.5)
CHLORIDE SERPL-SCNC: 100 MMOL/L (ref 96–112)
CHOLEST SERPL-MCNC: 207 MG/DL (ref 100–199)
CO2 SERPL-SCNC: 23 MMOL/L (ref 20–33)
CREAT SERPL-MCNC: 0.71 MG/DL (ref 0.5–1.4)
CREAT UR-MCNC: 91.54 MG/DL
FASTING STATUS PATIENT QL REPORTED: NORMAL
GLOBULIN SER CALC-MCNC: 3 G/DL (ref 1.9–3.5)
GLUCOSE SERPL-MCNC: 107 MG/DL (ref 65–99)
HDLC SERPL-MCNC: 48 MG/DL
LDLC SERPL CALC-MCNC: 116 MG/DL
MICROALBUMIN UR-MCNC: 3 MG/DL
MICROALBUMIN/CREAT UR: 33 MG/G (ref 0–30)
POTASSIUM SERPL-SCNC: 4 MMOL/L (ref 3.6–5.5)
PROT SERPL-MCNC: 7.6 G/DL (ref 6–8.2)
SODIUM SERPL-SCNC: 136 MMOL/L (ref 135–145)
TRIGL SERPL-MCNC: 214 MG/DL (ref 0–149)
URATE SERPL-MCNC: 5.1 MG/DL (ref 2.5–8.3)

## 2021-02-06 LAB — HEMOCCULT STL QL IA: NEGATIVE

## 2021-02-08 ENCOUNTER — TELEPHONE (OUTPATIENT)
Dept: MEDICAL GROUP | Facility: PHYSICIAN GROUP | Age: 56
End: 2021-02-08

## 2021-02-08 NOTE — LETTER
February 8, 2021         Moe Joey Josep  [address] [place] [address]        Dear Moe:      Below are the results from your recent visit:    Thanks for getting labs done.  Vitamin D is now in the normal range, keep up the suppliments.  Uric acid, kidney and liver function all normal.  Your cholesterol is still elevated at 207, down from 227 last year.  Your triglycerides have come down from 474 to 214.  Working on the diet and weight loss, even 10-15 pounds will help make a difference there.   Your A1c is looking good at 5.9.  Keep working hard.  Take care   PATTI Finnegan     Resulted Orders   URIC ACID   Result Value Ref Range    Uric Acid 5.1 2.5 - 8.3 mg/dL    Narrative    Request patient fasting?->Yes  Fasting Instructions:->Fast 8-10 hours, OK to drink water as  needed during fast, take medications per your provider's  instruction.   VITAMIN D,25 HYDROXY   Result Value Ref Range    25-Hydroxy   Vitamin D 25 55 30 - 100 ng/mL      Comment:      Adult Ranges:   <20 ng/mL - Deficiency  20-29 ng/mL - Insufficiency   ng/mL - Sufficiency  Effective 3/18/2020, this electrochemiluminescence binding assay is  performed using Roche dennis e immunoassay analyzer.  The [employer] Vitamin D  total II assay is intended for the quantitative determination of total 25  hydroxyvitamin D in human serum and plasma. This assay is to be used as an  aid in the assessment of vitamin D sufficiency in adults.      Narrative    Request patient fasting?->Yes  Fasting Instructions:->Fast 8-10 hours, OK to drink water as  needed during fast, take medications per your provider's  instruction.   Lipid Profile   Result Value Ref Range    Cholesterol,Tot 207 (H) 100 - 199 mg/dL    Triglycerides 214 (H) 0 - 149 mg/dL    HDL 48 >=40 mg/dL     (H) <100 mg/dL    Narrative    Request patient fasting?->Yes  Fasting Instructions:->Fast 8-10 hours, OK to drink water as  needed during fast, take medications per your  provider's  instruction.   MICROALBUMIN CREAT RATIO URINE   Result Value Ref Range    Creatinine, Urine 91.54 mg/dL    Microalbumin, Urine Random 3.0 mg/dL    Micro Alb Creat Ratio 33 (H) 0 - 30 mg/g   HEMOGLOBIN A1C   Result Value Ref Range    Glycohemoglobin 5.9 (H) 0.0 - 5.6 %      Comment:      Increased risk for diabetes:  5.7 -6.4%  Diabetes:  >6.4%  Glycemic control for adults with diabetes:  <7.0%  The above interpretations are per ADA guidelines.  Diagnosis  of diabetes mellitus on the basis of elevated Hemoglobin A1c  should be confirmed by repeating the Hb A1c test.      Est Avg Glucose 123 mg/dL      Comment:      The eAG calculation is based on the A1c-Derived Daily Glucose  (ADAG) study.  See the ADA's website for additional information.      Narrative    Request patient fasting?->Yes  Fasting Instructions:->Fast 8-10 hours, OK to drink water as  needed during fast, take medications per your provider's  instruction.   Comp Metabolic Panel   Result Value Ref Range    Sodium 136 135 - 145 mmol/L    Potassium 4.0 3.6 - 5.5 mmol/L    Chloride 100 96 - 112 mmol/L    Co2 23 20 - 33 mmol/L    Anion Gap 13.0 7.0 - 16.0    Glucose 107 (H) 65 - 99 mg/dL    Bun 18 8 - 22 mg/dL    Creatinine 0.71 0.50 - 1.40 mg/dL    Calcium 9.7 8.5 - 10.5 mg/dL    AST(SGOT) 29 12 - 45 U/L    ALT(SGPT) 43 2 - 50 U/L    Alkaline Phosphatase 81 30 - 99 U/L    Total Bilirubin 0.6 0.1 - 1.5 mg/dL    Albumin 4.6 3.2 - 4.9 g/dL    Total Protein 7.6 6.0 - 8.2 g/dL    Globulin 3.0 1.9 - 3.5 g/dL    A-G Ratio 1.5 g/dL    Narrative    Request patient fasting?->Yes  Fasting Instructions:->Fast 8-10 hours, OK to drink water as  needed during fast, take medications per your provider's  instruction.   CBC WITH DIFFERENTIAL   Result Value Ref Range    WBC 7.1 4.8 - 10.8 K/uL    RBC 5.41 4.70 - 6.10 M/uL    Hemoglobin 17.3 14.0 - 18.0 g/dL    Hematocrit 51.2 42.0 - 52.0 %    MCV 94.6 81.4 - 97.8 fL    MCH 32.0 27.0 - 33.0 pg    MCHC 33.8 33.7 -  35.3 g/dL    RDW 43.1 35.9 - 50.0 fL    Platelet Count 286 164 - 446 K/uL    MPV 10.2 9.0 - 12.9 fL    Neutrophils-Polys 47.10 44.00 - 72.00 %    Lymphocytes 37.80 22.00 - 41.00 %    Monocytes 12.40 0.00 - 13.40 %    Eosinophils 2.10 0.00 - 6.90 %    Basophils 0.30 0.00 - 1.80 %    Immature Granulocytes 0.30 0.00 - 0.90 %    Nucleated RBC 0.00 /100 WBC    Neutrophils (Absolute) 3.36 1.82 - 7.42 K/uL      Comment:      Includes immature neutrophils, if present.    Lymphs (Absolute) 2.69 1.00 - 4.80 K/uL    Monos (Absolute) 0.88 (H) 0.00 - 0.85 K/uL    Eos (Absolute) 0.15 0.00 - 0.51 K/uL    Baso (Absolute) 0.02 0.00 - 0.12 K/uL    Immature Granulocytes (abs) 0.02 0.00 - 0.11 K/uL    NRBC (Absolute) 0.00 K/uL    Narrative    Request patient fasting?->Yes  Fasting Instructions:->Fast 8-10 hours, OK to drink water as  needed during fast, take medications per your provider's  instruction.   FASTING STATUS   Result Value Ref Range    Fasting Status Fasting     Narrative    Request patient fasting?->Yes  Fasting Instructions:->Fast 8-10 hours, OK to drink water as  needed during fast, take medications per your provider's  instruction.   ESTIMATED GFR   Result Value Ref Range    GFR If African American >60 >60 mL/min/1.73 m 2    GFR If Non African American >60 >60 mL/min/1.73 m 2    Narrative    Request patient fasting?->Yes  Fasting Instructions:->Fast 8-10 hours, OK to drink water as  needed during fast, take medications per your provider's  instruction.     If you have any questions or concerns, please don't hesitate to call.    Electronically Signed

## 2021-02-09 DIAGNOSIS — I10 ESSENTIAL HYPERTENSION: ICD-10-CM

## 2021-02-09 NOTE — TELEPHONE ENCOUNTER
----- Message from PATTI Finnegan sent at 2/7/2021  2:37 PM PST -----  Moe  Thanks for getting labs done.  Vitamin D is now in the normal range, keep up the suppliments.  Uric acid, kidney and liver function all normal.  Your cholesterol is still elevated at 207, down from 227 last year.  Your triglycerides have come down from 474 to 214.  Working on the diet and weight loss, even 10-15 pounds will help make a difference there.  Your A1c is looking good at 5.9.  Keep working hard.  Take care  PATTI Finnegan

## 2021-02-10 RX ORDER — TELMISARTAN AND AMLODIPINE 10; 40 MG/1; MG/1
1 TABLET ORAL DAILY
Qty: 21 TABLET | Refills: 14 | Status: SHIPPED | OUTPATIENT
Start: 2021-02-10 | End: 2021-04-20

## 2021-02-10 NOTE — TELEPHONE ENCOUNTER
Requested Prescriptions     Signed Prescriptions Disp Refills   • Telmisartan-amLODIPine 40-10 MG Tab 21 tablet 14     Sig: Take 1 tablet by mouth every day. TAKE 1 TABLET DAILY FOR HIGH BLOOD PRESSURE     Authorizing Provider: REGULO SLAUGHTER A.P.R.N.

## 2021-02-10 NOTE — TELEPHONE ENCOUNTER
Received request via: Patient    Was the patient seen in the last year in this department? Yes    Does the patient have an active prescription (recently filled or refills available) for medication(s) requested? Mail order RX is lost in the mail and patient needs a 21 day supply sent locally per the mail order pharmacy.

## 2021-03-15 DIAGNOSIS — Z23 NEED FOR VACCINATION: ICD-10-CM

## 2021-04-20 DIAGNOSIS — I10 ESSENTIAL HYPERTENSION: ICD-10-CM

## 2021-04-20 RX ORDER — TELMISARTAN AND AMLODIPINE 10; 40 MG/1; MG/1
TABLET ORAL
Qty: 90 TABLET | Refills: 4 | Status: SHIPPED | OUTPATIENT
Start: 2021-04-20 | End: 2021-05-19 | Stop reason: SDUPTHER

## 2021-04-20 NOTE — TELEPHONE ENCOUNTER
Requested Prescriptions     Signed Prescriptions Disp Refills   • Telmisartan-amLODIPine 40-10 MG Tab 90 tablet 4     Sig: TAKE 1 TABLET BY MOUTH EVERY DAY FOR HIGH BLOOD BRESSURE     Authorizing Provider: REGULO SLAUGHTER A.P.R.N.

## 2021-04-20 NOTE — TELEPHONE ENCOUNTER
Received request via: Pharmacy    Was the patient seen in the last year in this department? Yes    Does the patient have an active prescription (recently filled or refills available) for medication(s) requested? REQUESTING 90 DAY SUPPLY

## 2021-05-11 ENCOUNTER — OFFICE VISIT (OUTPATIENT)
Dept: URGENT CARE | Facility: PHYSICIAN GROUP | Age: 56
End: 2021-05-11

## 2021-05-11 VITALS
OXYGEN SATURATION: 95 % | SYSTOLIC BLOOD PRESSURE: 134 MMHG | RESPIRATION RATE: 16 BRPM | HEART RATE: 85 BPM | HEIGHT: 72 IN | BODY MASS INDEX: 42.66 KG/M2 | WEIGHT: 315 LBS | DIASTOLIC BLOOD PRESSURE: 74 MMHG | TEMPERATURE: 97.5 F

## 2021-05-11 DIAGNOSIS — Z02.4 ENCOUNTER FOR COMMERCIAL DRIVER MEDICAL EXAMINATION (CDME): ICD-10-CM

## 2021-05-11 PROCEDURE — 7100 PR DOT PHYSICAL: Performed by: NURSE PRACTITIONER

## 2021-05-11 ASSESSMENT — FIBROSIS 4 INDEX: FIB4 SCORE: 0.87

## 2021-05-11 NOTE — PROGRESS NOTES
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   S) Reviewed History with pt.        No complaints    O) See dot physical exam forms and diagnostics attached to visit today.       A)   1. Encounter for  medical examination (CDME)           P) as documented on physical exam forms.   Qualified for 1 year certificate

## 2021-05-19 DIAGNOSIS — I10 ESSENTIAL HYPERTENSION: ICD-10-CM

## 2021-05-19 DIAGNOSIS — E78.5 HYPERLIPIDEMIA, UNSPECIFIED HYPERLIPIDEMIA TYPE: ICD-10-CM

## 2021-05-19 DIAGNOSIS — M1A.0720 IDIOPATHIC CHRONIC GOUT, LEFT ANKLE AND FOOT, WITHOUT TOPHUS (TOPHI): ICD-10-CM

## 2021-05-20 RX ORDER — HYDROCHLOROTHIAZIDE 12.5 MG/1
CAPSULE, GELATIN COATED ORAL
Qty: 90 CAPSULE | Refills: 0 | Status: SHIPPED | OUTPATIENT
Start: 2021-05-20 | End: 2021-08-12 | Stop reason: SDUPTHER

## 2021-05-20 RX ORDER — ALLOPURINOL 300 MG/1
TABLET ORAL
Qty: 90 TABLET | Refills: 0 | Status: SHIPPED | OUTPATIENT
Start: 2021-05-20 | End: 2021-08-12 | Stop reason: SDUPTHER

## 2021-05-20 RX ORDER — PRAVASTATIN SODIUM 80 MG/1
TABLET ORAL
Qty: 90 TABLET | Refills: 0 | Status: SHIPPED | OUTPATIENT
Start: 2021-05-20 | End: 2021-08-12 | Stop reason: SDUPTHER

## 2021-05-20 RX ORDER — METOPROLOL SUCCINATE 200 MG/1
200 TABLET, EXTENDED RELEASE ORAL DAILY
Qty: 90 TABLET | Refills: 0 | Status: SHIPPED | OUTPATIENT
Start: 2021-05-20 | End: 2021-08-12 | Stop reason: SDUPTHER

## 2021-05-20 RX ORDER — TELMISARTAN AND AMLODIPINE 10; 40 MG/1; MG/1
1 TABLET ORAL DAILY
Qty: 90 TABLET | Refills: 0 | Status: SHIPPED | OUTPATIENT
Start: 2021-05-20 | End: 2021-08-12 | Stop reason: SDUPTHER

## 2021-05-20 NOTE — TELEPHONE ENCOUNTER
Received request via: Patient    Was the patient seen in the last year in this department? No LAST SEEN 2/6/2020    Does the patient have an active prescription (recently filled or refills available) for medication(s) requested? MAIL ORDER PHARMACY REQUEST

## 2021-05-20 NOTE — TELEPHONE ENCOUNTER
Requested Prescriptions     Signed Prescriptions Disp Refills   • allopurinol (ZYLOPRIM) 300 MG Tab 90 tablet 0     Sig: TAKE 1 TABLET DAILY     Authorizing Provider: REGULO SLAUGHTER   • hydrochlorothiazide (MICROZIDE) 12.5 MG capsule 90 capsule 0     Sig: TAKE 1 CAPSULE DAILY     Authorizing Provider: REGULO SLAUGHTER   • metoprolol (TOPROL-XL) 200 MG XL tablet 90 tablet 0     Sig: Take 1 tablet by mouth every day. TAKE 1 TABLET DAILY FOR HIGH BLOOD PRESSURE     Authorizing Provider: REGULO SLAUGHTER   • pravastatin (PRAVACHOL) 80 MG tablet 90 tablet 0     Sig: TAKE 1 TABLET DAILY     Authorizing Provider: REGULO SLAUGHTER   • Telmisartan-amLODIPine 40-10 MG Tab 90 tablet 0     Sig: Take 1 tablet by mouth every day.     Authorizing Provider: REGULO SLAUGHTER     Due for yearly visit with pcp  PATTI Finnegan

## 2021-05-24 NOTE — TELEPHONE ENCOUNTER
Phone Number Called: [phone] (home)       Call outcome: Spoke to patient regarding message below.    Message: Due for yearly visit with pcp  PATTI Finnegan    PATIENT HAS [facility] AND WILL CALL WHEN THEY FIGURE OUT THE INSURANCE DEAL.

## 2021-06-24 DIAGNOSIS — Z00.6 RESEARCH STUDY PATIENT: ICD-10-CM

## 2021-06-28 ENCOUNTER — HOSPITAL ENCOUNTER (OUTPATIENT)
Facility: MEDICAL CENTER | Age: 56
End: 2021-06-28
Attending: PATHOLOGY
Payer: COMMERCIAL

## 2021-06-28 DIAGNOSIS — Z00.6 RESEARCH STUDY PATIENT: ICD-10-CM

## 2021-07-08 LAB
ELF SCORE: 10.3
RELATIVE RISK: ABNORMAL
RISK GROUP: ABNORMAL
RISK: 23.6 %

## 2021-08-12 ENCOUNTER — OFFICE VISIT (OUTPATIENT)
Dept: MEDICAL GROUP | Facility: PHYSICIAN GROUP | Age: 56
End: 2021-08-12
Payer: COMMERCIAL

## 2021-08-12 VITALS
OXYGEN SATURATION: 94 % | HEART RATE: 80 BPM | BODY MASS INDEX: 42.66 KG/M2 | HEIGHT: 72 IN | DIASTOLIC BLOOD PRESSURE: 80 MMHG | SYSTOLIC BLOOD PRESSURE: 122 MMHG | WEIGHT: 315 LBS | RESPIRATION RATE: 18 BRPM | TEMPERATURE: 97.2 F

## 2021-08-12 DIAGNOSIS — I10 ESSENTIAL HYPERTENSION: ICD-10-CM

## 2021-08-12 DIAGNOSIS — E11.9 TYPE 2 DIABETES MELLITUS WITHOUT COMPLICATION, WITHOUT LONG-TERM CURRENT USE OF INSULIN (HCC): ICD-10-CM

## 2021-08-12 DIAGNOSIS — M1A.0720 IDIOPATHIC CHRONIC GOUT, LEFT ANKLE AND FOOT, WITHOUT TOPHUS (TOPHI): ICD-10-CM

## 2021-08-12 DIAGNOSIS — E78.5 HYPERLIPIDEMIA, UNSPECIFIED HYPERLIPIDEMIA TYPE: ICD-10-CM

## 2021-08-12 DIAGNOSIS — M1A.0720 IDIOPATHIC CHRONIC GOUT OF LEFT FOOT WITHOUT TOPHUS: ICD-10-CM

## 2021-08-12 PROBLEM — Z79.84 LONG TERM (CURRENT) USE OF ORAL HYPOGLYCEMIC DRUGS: Status: RESOLVED | Noted: 2020-05-29 | Resolved: 2021-08-12

## 2021-08-12 LAB
HBA1C MFR BLD: 6.1 % (ref 0–5.6)
INT CON NEG: NEGATIVE
INT CON POS: POSITIVE

## 2021-08-12 PROCEDURE — 83036 HEMOGLOBIN GLYCOSYLATED A1C: CPT | Performed by: NURSE PRACTITIONER

## 2021-08-12 PROCEDURE — 99213 OFFICE O/P EST LOW 20 MIN: CPT | Performed by: NURSE PRACTITIONER

## 2021-08-12 RX ORDER — METFORMIN HYDROCHLORIDE 750 MG/1
750 TABLET, EXTENDED RELEASE ORAL DAILY
Qty: 90 TABLET | Refills: 3 | Status: SHIPPED | OUTPATIENT
Start: 2021-08-12

## 2021-08-12 RX ORDER — ALLOPURINOL 300 MG/1
TABLET ORAL
Qty: 90 TABLET | Refills: 3 | Status: SHIPPED | OUTPATIENT
Start: 2021-08-12

## 2021-08-12 RX ORDER — HYDROCHLOROTHIAZIDE 12.5 MG/1
CAPSULE, GELATIN COATED ORAL
Qty: 90 CAPSULE | Refills: 3 | Status: SHIPPED | OUTPATIENT
Start: 2021-08-12

## 2021-08-12 RX ORDER — TELMISARTAN AND AMLODIPINE 10; 40 MG/1; MG/1
1 TABLET ORAL DAILY
Qty: 90 TABLET | Refills: 3 | Status: SHIPPED | OUTPATIENT
Start: 2021-08-12

## 2021-08-12 RX ORDER — METOPROLOL SUCCINATE 200 MG/1
200 TABLET, EXTENDED RELEASE ORAL DAILY
Qty: 90 TABLET | Refills: 3 | Status: SHIPPED | OUTPATIENT
Start: 2021-08-12

## 2021-08-12 RX ORDER — EMPAGLIFLOZIN 25 MG/1
1 TABLET, FILM COATED ORAL DAILY
Qty: 90 TABLET | Refills: 4 | Status: SHIPPED | OUTPATIENT
Start: 2021-08-12

## 2021-08-12 RX ORDER — INDOMETHACIN 50 MG/1
50 CAPSULE ORAL 3 TIMES DAILY
Qty: 270 CAPSULE | Refills: 1 | Status: SHIPPED | OUTPATIENT
Start: 2021-08-12 | End: 2024-03-06

## 2021-08-12 RX ORDER — PRAVASTATIN SODIUM 80 MG/1
TABLET ORAL
Qty: 90 TABLET | Refills: 3 | Status: SHIPPED | OUTPATIENT
Start: 2021-08-12

## 2021-08-12 ASSESSMENT — FIBROSIS 4 INDEX: FIB4 SCORE: 0.88

## 2021-08-12 NOTE — ASSESSMENT & PLAN NOTE
Here for follow up to diabetes.  Monofilament done.  Has not been able to get reintal exam due to working 6-7 days/week.  No numbness or tingling noted.  No open sores. A1c at 6.1.  Reports such overwhelming tiredness due to his work schedule.

## 2021-08-12 NOTE — PROGRESS NOTES
Chief Complaint   Patient presents with   • Follow-Up   • Medication Management       Subjective:   Moe Car is a 56 y.o. male here today for evaluation and management of:    Type 2 diabetes mellitus without complication (CMS-[facility])  Here for follow up to diabetes.  Monofilament done.  Has not been able to get reintal exam due to working 6-7 days/week.  No numbness or tingling noted.  No open sores. A1c at 6.1.  Reports such overwhelming tiredness due to his work schedule.     Body mass index (BMI) of 45.0-49.9 in adult  BMI 45.3.  No exercise reported.      Hypertension  bp 122/80 no chest pain, shortness of breath or headaches reported.     Hyperlipidemia  Continues with pravastatin daily.  Refills today.  No exercise reported.  Weight is BMI 45.    Chronic idiopathic gout of left foot  Reports good control.  Taking allopurinol daily.  No flares. Does report high amount of processed foods, hotdogs.          Current medicines (including changes today)  Current Outpatient Medications   Medication Sig Dispense Refill   • allopurinol (ZYLOPRIM) 300 MG Tab TAKE 1 TABLET DAILY 90 Tablet 3   • hydrochlorothiazide (MICROZIDE) 12.5 MG capsule TAKE 1 CAPSULE DAILY 90 Capsule 3   • metoprolol (TOPROL-XL) 200 MG XL tablet Take 1 Tablet by mouth every day. TAKE 1 TABLET DAILY FOR HIGH BLOOD PRESSURE 90 Tablet 3   • pravastatin (PRAVACHOL) 80 MG tablet TAKE 1 TABLET DAILY 90 Tablet 3   • Telmisartan-amLODIPine 40-10 MG Tab Take 1 Tablet by mouth every day. 90 Tablet 3   • metFORMIN ER (GLUCOPHAGE XR) 750 MG TABLET SR 24 HR Take 1 Tablet by mouth every day. 90 Tablet 3   • Empagliflozin (JARDIANCE) 25 MG Tab Take 1 Tablet by mouth every day. 90 Tablet 4   • indomethacin (INDOCIN) 50 MG Cap Take 1 Capsule by mouth 3 times a day. For gout, stop when symptoms decrease  Indications: Acute Joint Inflammation in Gout 270 Capsule 1   • Semaglutide, 1 MG/DOSE, 2 MG/1.5ML Solution Pen-injector Inject 1 mg under the skin every 7  days. 4 Each 6   • acyclovir (ZOVIRAX) 5 % Ointment Apply to skin as directed qid 60 g 3   • Multiple Vitamins-Minerals (MULTIVITAMIN ADULT PO) Take  by mouth.     • aspirin EC (ECOTRIN) 81 MG Tablet Delayed Response Take 81 mg by mouth every day.     • acetaminophen (TYLENOL) 500 MG Tab Take 500-1,000 mg by mouth every 6 hours as needed.     • Cholecalciferol (VITAMIN D-3) 5000 UNITS TABS Take  by mouth.     • Coenzyme Q10 (CO Q 10 PO) Take  by mouth.       No current facility-administered medications for this visit.     He  has a past medical history of Hyperlipidemia (10/21/2009), Hypertension (10/21/2009), and Obstructive sleep apnea (12/2/2009).    ROS as stated in hpi  No chest pain, no shortness of breath, no abdominal pain       Objective:     /80 (BP Location: Left arm, Patient Position: Sitting)   Pulse 80   Temp 36.2 °C (97.2 °F)   Resp 18   Ht 1.829 m (6')   Wt (!) 154 kg (339 lb)   SpO2 94%  Body mass index is 45.98 kg/m².   Physical Exam:  Constitutional: Alert, no distress.  Skin: Warm, dry, good turgor,no cyanosis, no rashes in visible areas.  Eye: Equal, round and reactive, conjunctiva clear, lids normal.  Neck: Trachea midline, no masses, no obvious thyroid enlargement.. No cervical or supraclavicular lymphadenopathy. Range of motion within normal limits.  Neuro: Cranial nerves 2-12 grossly intact.  No sensory deficit.  Respiratory: Unlabored respiratory effort, lungs clear to auscultation, no wheezes, no ronchi.  Cardiovascular: Normal S1, S2, no murmur, no edema.  Monofilament testing with a 10 gram force: sensation intact: intact bilaterally  Visual Inspection: Feet without maceration, ulcers, fissures.  Pedal pulses: intact bilaterally    Psych: Alert and oriented x3, normal affect and mood and judgement.        Assessment and Plan:   The following treatment plan was discussed    1. Type 2 diabetes mellitus without complication, without long-term current use of insulin  (HCC)  Chronic, ongoing, A1c up slightly.  Discussed ideas to help him with portable foods he can take on the train () that won't increase his sugars.   Refills today Encouraged weight loss.  Monitor.  Return in 6 months  - POCT  A1C    2. Body mass index (BMI) of 45.0-49.9 in adult (HCC)  Chronic, ongoing. Encouraged to lose weight.  Has participated in ELF study with moderate risk.  Understands importance of this.  Monitor and follow.     3. Essential hypertension  Chronic, ongoing. Stable.  Continue current meds.  Refills today.        Followup: Return in about 6 months (around 2/12/2022) for Multiple issues.         Educated in proper administration of medication(s) ordered today including safety, possible SE, risks, benefits, rationale and alternatives to therapy.     Please note that this dictation was created using voice recognition software. I have made every reasonable attempt to correct obvious errors, but I expect that there are errors of grammar and possibly content that I did not discover before finalizing the note.

## 2021-09-22 ENCOUNTER — HOSPITAL ENCOUNTER (OUTPATIENT)
Facility: MEDICAL CENTER | Age: 56
End: 2021-09-22
Attending: STUDENT IN AN ORGANIZED HEALTH CARE EDUCATION/TRAINING PROGRAM
Payer: COMMERCIAL

## 2021-09-22 ENCOUNTER — OFFICE VISIT (OUTPATIENT)
Dept: URGENT CARE | Facility: PHYSICIAN GROUP | Age: 56
End: 2021-09-22
Payer: COMMERCIAL

## 2021-09-22 VITALS
TEMPERATURE: 97.8 F | DIASTOLIC BLOOD PRESSURE: 80 MMHG | OXYGEN SATURATION: 98 % | SYSTOLIC BLOOD PRESSURE: 144 MMHG | BODY MASS INDEX: 42.66 KG/M2 | WEIGHT: 315 LBS | HEIGHT: 72 IN | RESPIRATION RATE: 16 BRPM | HEART RATE: 80 BPM

## 2021-09-22 DIAGNOSIS — Z20.822 COVID-19 RULED OUT: ICD-10-CM

## 2021-09-22 DIAGNOSIS — B34.9 ACUTE VIRAL SYNDROME: ICD-10-CM

## 2021-09-22 PROCEDURE — U0003 INFECTIOUS AGENT DETECTION BY NUCLEIC ACID (DNA OR RNA); SEVERE ACUTE RESPIRATORY SYNDROME CORONAVIRUS 2 (SARS-COV-2) (CORONAVIRUS DISEASE [COVID-19]), AMPLIFIED PROBE TECHNIQUE, MAKING USE OF HIGH THROUGHPUT TECHNOLOGIES AS DESCRIBED BY CMS-2020-01-R: HCPCS

## 2021-09-22 PROCEDURE — U0005 INFEC AGEN DETEC AMPLI PROBE: HCPCS

## 2021-09-22 PROCEDURE — 99213 OFFICE O/P EST LOW 20 MIN: CPT | Mod: CS | Performed by: STUDENT IN AN ORGANIZED HEALTH CARE EDUCATION/TRAINING PROGRAM

## 2021-09-22 ASSESSMENT — FIBROSIS 4 INDEX: FIB4 SCORE: 0.88

## 2021-09-22 NOTE — PROGRESS NOTES
Subjective:   CHIEF COMPLAINT  Chief Complaint   Patient presents with   • Coronavirus Screening     x4 days, cough, fever, chills, no taste or smell, pos home test today       HPI  Moe Car is a 56 y.o. male who presents concerns for possible covid infection.  Says he had a positive test at home.  Pt has been experiencing cough, body aches and loss of taste x3-4 days. Sx have been improving. He has not tried any medications. Denies any associated sx of wheezing or SOB. No sick contacts. Pt is immunized against covid.     REVIEW OF SYSTEMS  General: no fever or chills  GI: no nausea or vomiting  See HPI for further details.     PAST MEDICAL HISTORY  Patient Active Problem List    Diagnosis Date Noted   • Chronic idiopathic gout of left foot 12/19/2016   • Type 2 diabetes mellitus without complication ([facility]) 10/13/2016   • Body mass index (BMI) of 45.0-49.9 in adult ([facility]) 05/23/2012   • Vitamin D deficiency 07/29/2011   • Hypertension 03/02/2011   • Obstructive sleep apnea 12/02/2009   • Hyperlipidemia 10/21/2009       SURGICAL HISTORY   has a past surgical history that includes vasectomy.    ALLERGIES  Allergies   Allergen Reactions   • Crestor [Rosuvastatin Calcium]      Heart palpitations       CURRENT MEDICATIONS  Home Medications     Reviewed by Eric Milan Ass't (Medical Assistant) on 09/22/21 at 1413  Med List Status: <None>   Medication Last Dose Status   acetaminophen (TYLENOL) 500 MG Tab Taking Active   acyclovir (ZOVIRAX) 5 % Ointment PRN Active   allopurinol (ZYLOPRIM) 300 MG Tab  Active   aspirin EC (ECOTRIN) 81 MG Tablet Delayed Response Taking Active   Cholecalciferol (VITAMIN D-3) 5000 UNITS TABS Taking Active   Coenzyme Q10 (CO Q 10 PO) Taking Active   Empagliflozin (JARDIANCE) 25 MG Tab  Active   hydrochlorothiazide (MICROZIDE) 12.5 MG capsule  Active   indomethacin (INDOCIN) 50 MG Cap  Active   metFORMIN ER (GLUCOPHAGE XR) 750 MG TABLET SR 24 HR  Active   metoprolol (TOPROL-XL)  200 MG XL tablet  Active   Multiple Vitamins-Minerals (MULTIVITAMIN ADULT PO) Taking Active   pravastatin (PRAVACHOL) 80 MG tablet  Active   Semaglutide, 1 MG/DOSE, 2 MG/1.5ML Solution Pen-injector Taking Active   Telmisartan-amLODIPine 40-10 MG Tab  Active                SOCIAL HISTORY  Social History     Tobacco Use   • Smoking status: Never Smoker   • Smokeless tobacco: Current User     Types: Chew   Vaping Use   • Vaping Use: Never used   Substance and Sexual Activity   • Alcohol use: Yes     Alcohol/week: 1.5 oz     Types: 3 Cans of beer per week     Comment: day   • Drug use: No   • Sexual activity: Yes     Partners: Female     Birth control/protection: Surgical       FAMILY HISTORY  Family History   Problem Relation Age of Onset   • Hypertension Mother    • Heart Disease Mother         cad  at 60   • Hypertension Father    • Heart Disease Paternal Grandfather           Objective:   PHYSICAL EXAM  VITAL SIGNS: /80   Pulse 80   Temp 36.6 °C (97.8 °F) (Temporal)   Resp 16   Ht 1.829 m (6')   Wt (!) 150 kg (330 lb)   SpO2 98%   BMI 44.76 kg/m²     Gen: no acute distress, normal voice  Skin: dry, intact, moist mucosal membranes  Lungs: CTAB w/ symmetric expansion  CV: RRR w/o murmurs or clicks  Psych: normal affect, normal judgement, alert, awake    Assessment/Plan:     1. Acute viral syndrome  COVID/SARS CoV-2 PCR   2. COVID-19 ruled out  COVID/SARS CoV-2 PCR   Signs and symptoms are consistent with a viral upper respiratory tract infection. He is immunized against covid.   -Ordered Covid.  Results will be sent through [employer].  -Instructed to quarantine until Covid results have been returned  -Encouraged hydration and symptomatic treatment with Tylenol/ibuprofen prn  -Discussed red flags and return precautions.  Patient verbalized their understanding.  All questions were answered.    Differential diagnosis, natural history, supportive care, and indications for immediate follow-up discussed.  Patient agrees with the plan of care.    Follow-up as needed if symptoms worsen or fail to improve to PCP, Urgent care or Emergency Room.       Please note that this dictation was created using voice recognition software. I have made a reasonable attempt to correct obvious errors, but I expect that there are errors of grammar and possibly content that I did not discover before finalizing the note.

## 2021-09-23 DIAGNOSIS — Z20.822 COVID-19 RULED OUT: ICD-10-CM

## 2021-09-23 DIAGNOSIS — B34.9 ACUTE VIRAL SYNDROME: ICD-10-CM

## 2021-09-23 LAB — COVID ORDER STATUS COVID19: NORMAL

## 2021-09-24 LAB
SARS-COV-2 RNA RESP QL NAA+PROBE: DETECTED
SPECIMEN SOURCE: ABNORMAL

## 2021-11-18 NOTE — TELEPHONE ENCOUNTER
Requested Prescriptions     Signed Prescriptions Disp Refills   • Semaglutide, 1 MG/DOSE, 2 MG/1.5ML Solution Pen-injector 12 Each 1     Sig: Inject 1 mg under the skin every 7 days.     Authorizing Provider: REGULO SLAUGHTER A.P.R.N.

## 2022-02-15 ENCOUNTER — APPOINTMENT (OUTPATIENT)
Dept: MEDICAL GROUP | Facility: PHYSICIAN GROUP | Age: 57
End: 2022-02-15
Payer: COMMERCIAL

## 2022-05-04 ENCOUNTER — OFFICE VISIT (OUTPATIENT)
Dept: URGENT CARE | Facility: PHYSICIAN GROUP | Age: 57
End: 2022-05-04

## 2022-05-04 VITALS
HEART RATE: 78 BPM | OXYGEN SATURATION: 100 % | BODY MASS INDEX: 42.66 KG/M2 | DIASTOLIC BLOOD PRESSURE: 78 MMHG | SYSTOLIC BLOOD PRESSURE: 132 MMHG | WEIGHT: 315 LBS | HEIGHT: 72 IN | RESPIRATION RATE: 16 BRPM | TEMPERATURE: 98.1 F

## 2022-05-04 DIAGNOSIS — Z02.4 ENCOUNTER FOR COMMERCIAL DRIVER MEDICAL EXAMINATION (CDME): ICD-10-CM

## 2022-05-04 PROCEDURE — 7100 PR DOT PHYSICAL: Performed by: NURSE PRACTITIONER

## 2022-05-04 ASSESSMENT — FIBROSIS 4 INDEX: FIB4 SCORE: 0.88

## 2022-05-04 NOTE — PROGRESS NOTES
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   S) Reviewed History with pt.        No complaints    O) See physical exam forms and diagnostics attached to visit today.       A)   1. Encounter for  medical examination (CDME)           P) as documented on physical exam forms.   Qualified for 1 year medical certificate.

## 2023-05-01 ENCOUNTER — OFFICE VISIT (OUTPATIENT)
Dept: URGENT CARE | Facility: PHYSICIAN GROUP | Age: 58
End: 2023-05-01

## 2023-05-01 VITALS
BODY MASS INDEX: 42.66 KG/M2 | SYSTOLIC BLOOD PRESSURE: 126 MMHG | HEART RATE: 75 BPM | TEMPERATURE: 96.6 F | OXYGEN SATURATION: 96 % | WEIGHT: 315 LBS | RESPIRATION RATE: 18 BRPM | DIASTOLIC BLOOD PRESSURE: 78 MMHG | HEIGHT: 72 IN

## 2023-05-01 DIAGNOSIS — Z02.4 ENCOUNTER FOR CDL (COMMERCIAL DRIVING LICENSE) EXAM: ICD-10-CM

## 2023-05-01 PROCEDURE — 7100 PR DOT PHYSICAL: Performed by: FAMILY MEDICINE

## 2023-05-01 NOTE — PROGRESS NOTES
Subjective:   Moe Car is a 57 y.o. male who presents for Employment Physical (DOT PHYSICAL )        For complete documentation please see DOT physical form scanned into chart.      PMH:  has a past medical history of Hyperlipidemia (10/21/2009), Hypertension (10/21/2009), and Obstructive sleep apnea (12/2/2009).  MEDS:   Current Outpatient Medications:     Semaglutide, 1 MG/DOSE, 2 MG/1.5ML Solution Pen-injector, Inject 1 mg under the skin every 7 days., Disp: 12 Each, Rfl: 1    allopurinol (ZYLOPRIM) 300 MG Tab, TAKE 1 TABLET DAILY, Disp: 90 Tablet, Rfl: 3    hydrochlorothiazide (MICROZIDE) 12.5 MG capsule, TAKE 1 CAPSULE DAILY, Disp: 90 Capsule, Rfl: 3    metoprolol (TOPROL-XL) 200 MG XL tablet, Take 1 Tablet by mouth every day. TAKE 1 TABLET DAILY FOR HIGH BLOOD PRESSURE, Disp: 90 Tablet, Rfl: 3    pravastatin (PRAVACHOL) 80 MG tablet, TAKE 1 TABLET DAILY, Disp: 90 Tablet, Rfl: 3    Telmisartan-amLODIPine 40-10 MG Tab, Take 1 Tablet by mouth every day., Disp: 90 Tablet, Rfl: 3    metFORMIN ER (GLUCOPHAGE XR) 750 MG TABLET SR 24 HR, Take 1 Tablet by mouth every day., Disp: 90 Tablet, Rfl: 3    Empagliflozin (JARDIANCE) 25 MG Tab, Take 1 Tablet by mouth every day., Disp: 90 Tablet, Rfl: 4    indomethacin (INDOCIN) 50 MG Cap, Take 1 Capsule by mouth 3 times a day. For gout, stop when symptoms decrease  Indications: Acute Joint Inflammation in Gout, Disp: 270 Capsule, Rfl: 1    acyclovir (ZOVIRAX) 5 % Ointment, Apply to skin as directed qid, Disp: 60 g, Rfl: 3    Multiple Vitamins-Minerals (MULTIVITAMIN ADULT PO), Take  by mouth., Disp: , Rfl:     aspirin EC (ECOTRIN) 81 MG Tablet Delayed Response, Take 81 mg by mouth every day., Disp: , Rfl:     acetaminophen (TYLENOL) 500 MG Tab, Take 500-1,000 mg by mouth every 6 hours as needed., Disp: , Rfl:     Cholecalciferol (VITAMIN D-3) 5000 UNITS TABS, Take  by mouth., Disp: , Rfl:     Coenzyme Q10 (CO Q 10 PO), Take  by mouth., Disp: , Rfl:   ALLERGIES:    Allergies   Allergen Reactions    Crestor [Rosuvastatin Calcium]      Heart palpitations     SURGHX:   Past Surgical History:   Procedure Laterality Date    VASECTOMY       SOCHX:  reports that he has never smoked. His smokeless tobacco use includes chew. He reports current alcohol use of about 1.5 oz per week. He reports that he does not use drugs.  FH:   Family History   Problem Relation Age of Onset    Hypertension Mother     Heart Disease Mother         cad  at 60    Hypertension Father     Heart Disease Paternal Grandfather      Review of Systems   All other systems reviewed and are negative.     Objective:   /78   Pulse 75   Temp 35.9 °C (96.6 °F) (Temporal)   Resp 18   Ht 1.829 m (6')   Wt (!) 159 kg (350 lb)   SpO2 96%   BMI 47.47 kg/m²   Physical Exam  Vitals and nursing note reviewed.   Constitutional:       General: He is not in acute distress.     Appearance: He is well-developed.   HENT:      Head: Normocephalic and atraumatic.      Right Ear: External ear normal.      Left Ear: External ear normal.      Nose: Nose normal.      Mouth/Throat:      Mouth: Mucous membranes are moist.   Eyes:      Conjunctiva/sclera: Conjunctivae normal.   Cardiovascular:      Rate and Rhythm: Normal rate.   Pulmonary:      Effort: Pulmonary effort is normal. No respiratory distress.      Breath sounds: Normal breath sounds.   Abdominal:      General: There is no distension.   Musculoskeletal:         General: Normal range of motion.   Skin:     General: Skin is warm and dry.   Neurological:      General: No focal deficit present.      Mental Status: He is alert and oriented to person, place, and time. Mental status is at baseline.      Gait: Gait (gait at baseline) normal.   Psychiatric:         Judgment: Judgment normal.         Assessment/Plan:   1. Encounter for CDL (commercial driving license) exam    For complete documentation please see DOT physical form scanned into chart.    Medical clearance  5/1/2023 until 5/1/2024      Please note that this dictation was created using voice recognition software. I have worked with consultants from the vendor as well as technical experts from [facility] to optimize the interface. I have made every reasonable attempt to correct obvious errors, but I expect that there are errors of grammar and possibly content that I did not discover before finalizing the note.

## 2023-08-07 ENCOUNTER — HOSPITAL ENCOUNTER (OUTPATIENT)
Dept: LAB | Facility: MEDICAL CENTER | Age: 58
End: 2023-08-07
Attending: FAMILY MEDICINE
Payer: COMMERCIAL

## 2023-08-07 LAB
ALBUMIN SERPL BCP-MCNC: 4.7 G/DL (ref 3.2–4.9)
ALBUMIN/GLOB SERPL: 1.4 G/DL
ALP SERPL-CCNC: 91 U/L (ref 30–99)
ALT SERPL-CCNC: 55 U/L (ref 2–50)
ANION GAP SERPL CALC-SCNC: 12 MMOL/L (ref 7–16)
AST SERPL-CCNC: 31 U/L (ref 12–45)
BASOPHILS # BLD AUTO: 0.5 % (ref 0–1.8)
BASOPHILS # BLD: 0.04 K/UL (ref 0–0.12)
BILIRUB SERPL-MCNC: 0.5 MG/DL (ref 0.1–1.5)
BUN SERPL-MCNC: 18 MG/DL (ref 8–22)
CALCIUM ALBUM COR SERPL-MCNC: 9.4 MG/DL (ref 8.5–10.5)
CALCIUM SERPL-MCNC: 10 MG/DL (ref 8.5–10.5)
CHLORIDE SERPL-SCNC: 103 MMOL/L (ref 96–112)
CHOLEST SERPL-MCNC: 189 MG/DL (ref 100–199)
CO2 SERPL-SCNC: 23 MMOL/L (ref 20–33)
CREAT SERPL-MCNC: 0.65 MG/DL (ref 0.5–1.4)
CREAT UR-MCNC: 53.34 MG/DL
EOSINOPHIL # BLD AUTO: 0.18 K/UL (ref 0–0.51)
EOSINOPHIL NFR BLD: 2.4 % (ref 0–6.9)
ERYTHROCYTE [DISTWIDTH] IN BLOOD BY AUTOMATED COUNT: 44.8 FL (ref 35.9–50)
FASTING STATUS PATIENT QL REPORTED: NORMAL
GFR SERPLBLD CREATININE-BSD FMLA CKD-EPI: 108 ML/MIN/1.73 M 2
GLOBULIN SER CALC-MCNC: 3.4 G/DL (ref 1.9–3.5)
GLUCOSE SERPL-MCNC: 123 MG/DL (ref 65–99)
HCT VFR BLD AUTO: 53.6 % (ref 42–52)
HDLC SERPL-MCNC: 41 MG/DL
HGB BLD-MCNC: 17.6 G/DL (ref 14–18)
IMM GRANULOCYTES # BLD AUTO: 0.03 K/UL (ref 0–0.11)
IMM GRANULOCYTES NFR BLD AUTO: 0.4 % (ref 0–0.9)
LDLC SERPL CALC-MCNC: 108 MG/DL
LYMPHOCYTES # BLD AUTO: 2.7 K/UL (ref 1–4.8)
LYMPHOCYTES NFR BLD: 35.5 % (ref 22–41)
MCH RBC QN AUTO: 31.2 PG (ref 27–33)
MCHC RBC AUTO-ENTMCNC: 32.8 G/DL (ref 32.3–36.5)
MCV RBC AUTO: 94.9 FL (ref 81.4–97.8)
MICROALBUMIN UR-MCNC: 3.4 MG/DL
MICROALBUMIN/CREAT UR: 64 MG/G (ref 0–30)
MONOCYTES # BLD AUTO: 0.87 K/UL (ref 0–0.85)
MONOCYTES NFR BLD AUTO: 11.4 % (ref 0–13.4)
NEUTROPHILS # BLD AUTO: 3.78 K/UL (ref 1.82–7.42)
NEUTROPHILS NFR BLD: 49.8 % (ref 44–72)
NRBC # BLD AUTO: 0 K/UL
NRBC BLD-RTO: 0 /100 WBC (ref 0–0.2)
PLATELET # BLD AUTO: 306 K/UL (ref 164–446)
PMV BLD AUTO: 10.4 FL (ref 9–12.9)
POTASSIUM SERPL-SCNC: 4.2 MMOL/L (ref 3.6–5.5)
PROT SERPL-MCNC: 8.1 G/DL (ref 6–8.2)
RBC # BLD AUTO: 5.65 M/UL (ref 4.7–6.1)
SODIUM SERPL-SCNC: 138 MMOL/L (ref 135–145)
TRIGL SERPL-MCNC: 201 MG/DL (ref 0–149)
TSH SERPL DL<=0.005 MIU/L-ACNC: 3.47 UIU/ML (ref 0.38–5.33)
WBC # BLD AUTO: 7.6 K/UL (ref 4.8–10.8)

## 2023-08-07 PROCEDURE — 82570 ASSAY OF URINE CREATININE: CPT

## 2023-08-07 PROCEDURE — 82043 UR ALBUMIN QUANTITATIVE: CPT

## 2023-08-07 PROCEDURE — 85025 COMPLETE CBC W/AUTO DIFF WBC: CPT

## 2023-08-07 PROCEDURE — 80061 LIPID PANEL: CPT

## 2023-08-07 PROCEDURE — 80053 COMPREHEN METABOLIC PANEL: CPT

## 2023-08-07 PROCEDURE — 36415 COLL VENOUS BLD VENIPUNCTURE: CPT

## 2023-08-07 PROCEDURE — 84443 ASSAY THYROID STIM HORMONE: CPT

## 2024-01-14 ENCOUNTER — OFFICE VISIT (OUTPATIENT)
Dept: URGENT CARE | Facility: PHYSICIAN GROUP | Age: 59
End: 2024-01-14
Payer: COMMERCIAL

## 2024-01-14 VITALS
DIASTOLIC BLOOD PRESSURE: 74 MMHG | RESPIRATION RATE: 16 BRPM | HEIGHT: 72 IN | WEIGHT: 315 LBS | HEART RATE: 83 BPM | TEMPERATURE: 96.6 F | OXYGEN SATURATION: 94 % | BODY MASS INDEX: 42.66 KG/M2 | SYSTOLIC BLOOD PRESSURE: 122 MMHG

## 2024-01-14 DIAGNOSIS — L03.115 CELLULITIS OF RIGHT LOWER EXTREMITY: ICD-10-CM

## 2024-01-14 DIAGNOSIS — R68.83 CHILLS: ICD-10-CM

## 2024-01-14 DIAGNOSIS — M25.511 ACUTE PAIN OF RIGHT SHOULDER: ICD-10-CM

## 2024-01-14 PROCEDURE — 3074F SYST BP LT 130 MM HG: CPT | Performed by: PHYSICIAN ASSISTANT

## 2024-01-14 PROCEDURE — 3078F DIAST BP <80 MM HG: CPT | Performed by: PHYSICIAN ASSISTANT

## 2024-01-14 PROCEDURE — 99214 OFFICE O/P EST MOD 30 MIN: CPT | Performed by: PHYSICIAN ASSISTANT

## 2024-01-14 ASSESSMENT — ENCOUNTER SYMPTOMS
PALPITATIONS: 0
SHORTNESS OF BREATH: 0
CHILLS: 1
FEVER: 0

## 2024-01-14 ASSESSMENT — FIBROSIS 4 INDEX: FIB4 SCORE: 0.81

## 2024-01-14 NOTE — PROGRESS NOTES
Subjective:   Moe Car is a 58 y.o. male who presents today with   Chief Complaint   Patient presents with    Leg Swelling     Jungle rot on L leg, ulcers, has diabetes, freezing, been keeping up on management of diabetes, loss of appitite, nausea pain, feverish, diarrhea    Shoulder Pain     R shoulder hurts       Wound Infection  This is a new problem. The current episode started yesterday. The problem occurs constantly. The problem has been gradually worsening. Associated symptoms include chills. Pertinent negatives include no chest pain or fever. He has tried nothing for the symptoms. The treatment provided no relief.     Patient states has been having ongoing persistent shoulder pain for a while now but just as of recently over the last few days his shoulder has become very uncomfortable to the point of him not being able to move it at all without pain.  Complicated by diabetes but patient states his blood sugars have been running fairly normal and a couple days ago he checked his blood sugar and it was at 96 an hour after eating.    PMH:  has a past medical history of Hyperlipidemia (10/21/2009), Hypertension (10/21/2009), and Obstructive sleep apnea (12/2/2009).  MEDS:   Current Outpatient Medications:     Semaglutide, 1 MG/DOSE, 2 MG/1.5ML Solution Pen-injector, Inject 1 mg under the skin every 7 days., Disp: 12 Each, Rfl: 1    allopurinol (ZYLOPRIM) 300 MG Tab, TAKE 1 TABLET DAILY, Disp: 90 Tablet, Rfl: 3    hydrochlorothiazide (MICROZIDE) 12.5 MG capsule, TAKE 1 CAPSULE DAILY, Disp: 90 Capsule, Rfl: 3    metoprolol (TOPROL-XL) 200 MG XL tablet, Take 1 Tablet by mouth every day. TAKE 1 TABLET DAILY FOR HIGH BLOOD PRESSURE, Disp: 90 Tablet, Rfl: 3    pravastatin (PRAVACHOL) 80 MG tablet, TAKE 1 TABLET DAILY, Disp: 90 Tablet, Rfl: 3    Telmisartan-amLODIPine 40-10 MG Tab, Take 1 Tablet by mouth every day., Disp: 90 Tablet, Rfl: 3    metFORMIN ER (GLUCOPHAGE XR) 750 MG TABLET SR 24 HR, Take 1  Tablet by mouth every day., Disp: 90 Tablet, Rfl: 3    Empagliflozin (JARDIANCE) 25 MG Tab, Take 1 Tablet by mouth every day., Disp: 90 Tablet, Rfl: 4    indomethacin (INDOCIN) 50 MG Cap, Take 1 Capsule by mouth 3 times a day. For gout, stop when symptoms decrease  Indications: Acute Joint Inflammation in Gout, Disp: 270 Capsule, Rfl: 1    acyclovir (ZOVIRAX) 5 % Ointment, Apply to skin as directed qid, Disp: 60 g, Rfl: 3    Multiple Vitamins-Minerals (MULTIVITAMIN ADULT PO), Take  by mouth., Disp: , Rfl:     aspirin EC (ECOTRIN) 81 MG Tablet Delayed Response, Take 81 mg by mouth every day., Disp: , Rfl:     acetaminophen (TYLENOL) 500 MG Tab, Take 500-1,000 mg by mouth every 6 hours as needed., Disp: , Rfl:     Cholecalciferol (VITAMIN D-3) 5000 UNITS TABS, Take  by mouth., Disp: , Rfl:     Coenzyme Q10 (CO Q 10 PO), Take  by mouth., Disp: , Rfl:   ALLERGIES:   Allergies   Allergen Reactions    Crestor [Rosuvastatin Calcium]      Heart palpitations     SURGHX:   Past Surgical History:   Procedure Laterality Date    VASECTOMY       SOCHX:  reports that he has never smoked. His smokeless tobacco use includes chew. He reports current alcohol use of about 1.5 oz of alcohol per week. He reports that he does not use drugs.  FH: Reviewed with patient, not pertinent to this visit.     Review of Systems   Constitutional:  Positive for chills. Negative for fever.   Respiratory:  Negative for shortness of breath.    Cardiovascular:  Negative for chest pain and palpitations.   Musculoskeletal:         Right shoulder pain   Skin:         Right leg infection/swelling      Objective:   /74   Pulse 83   Temp 35.9 °C (96.6 °F) (Temporal)   Resp 16   Ht 1.829 m (6')   Wt (!) 145 kg (319 lb)   SpO2 94%   BMI 43.26 kg/m²   Physical Exam  Vitals and nursing note reviewed.   Constitutional:       General: He is not in acute distress.     Appearance: Normal appearance. He is well-developed. He is not ill-appearing or  toxic-appearing.   HENT:      Head: Normocephalic and atraumatic.      Right Ear: Hearing normal.      Left Ear: Hearing normal.   Cardiovascular:      Rate and Rhythm: Normal rate and regular rhythm.      Heart sounds: Normal heart sounds.   Pulmonary:      Effort: Pulmonary effort is normal.      Breath sounds: Normal breath sounds.   Musculoskeletal:      Comments: No tenderness to palpation of the right shoulder but patient does have some slight tenderness into the trapezius area.  No midline spinous process tenderness.  Patient has discomfort with movement of the right upper extremity.   Skin:     General: Skin is warm.             Comments: Significant erythema and swelling noted to the right lower extremity with above plan areas with drainage.  No necrosis.   Neurological:      Mental Status: He is alert.      Coordination: Coordination normal.   Psychiatric:         Mood and Affect: Mood normal.         Assessment/Plan:   Assessment    1. Cellulitis of right lower extremity    2. Chills    3. Acute pain of right shoulder    At this time given concern for right lower extremity cellulitis and potential for sepsis I recommend he go to the ER at this time for IV antibiotics and further evaluation and treatment.  Patient significant other and the patient are agreeable with this plan.  She will drive him to the nearest emergency room which is more than about the ER.  Offered to call for transport but they declined.  Possibility of DVT as well as infection of the right leg.    Please note that this dictation was created using voice recognition software. I have made every reasonable attempt to correct obvious errors, but I expect that there are errors of grammar and possibly content that I did not discover before finalizing the note.    Kurt Alexandre PA-C

## 2024-01-24 ENCOUNTER — HOSPITAL ENCOUNTER (OUTPATIENT)
Dept: LAB | Facility: MEDICAL CENTER | Age: 59
End: 2024-01-24
Attending: PHYSICIAN ASSISTANT
Payer: COMMERCIAL

## 2024-01-24 PROBLEM — M75.50 SUBACROMIAL BURSITIS: Status: ACTIVE | Noted: 2024-01-24

## 2024-01-24 PROBLEM — M66.321 NONTRAUMATIC RUPTURE OF RIGHT PROXIMAL BICEPS TENDON: Status: ACTIVE | Noted: 2024-01-24

## 2024-01-24 PROBLEM — S43.431A NONTRAUMATIC TYPE 1 SUPERIOR LABRAL ANTERIOR-TO-POSTERIOR (SLAP) TEAR OF RIGHT SHOULDER: Status: ACTIVE | Noted: 2024-01-24

## 2024-01-24 PROBLEM — M19.019 ACROMIOCLAVICULAR ARTHROSIS: Status: ACTIVE | Noted: 2024-01-24

## 2024-01-24 PROBLEM — M75.100 ROTATOR CUFF TEAR: Status: ACTIVE | Noted: 2024-01-24

## 2024-01-24 LAB
ALBUMIN SERPL BCP-MCNC: 3.3 G/DL (ref 3.2–4.9)
ALBUMIN/GLOB SERPL: 0.5 G/DL
ALP SERPL-CCNC: 82 U/L (ref 30–99)
ALT SERPL-CCNC: 28 U/L (ref 2–50)
ANION GAP SERPL CALC-SCNC: 13 MMOL/L (ref 7–16)
APPEARANCE UR: CLEAR
AST SERPL-CCNC: 39 U/L (ref 12–45)
BILIRUB SERPL-MCNC: 0.3 MG/DL (ref 0.1–1.5)
BILIRUB UR QL STRIP.AUTO: NEGATIVE
BUN SERPL-MCNC: 16 MG/DL (ref 8–22)
CALCIUM ALBUM COR SERPL-MCNC: 10 MG/DL (ref 8.5–10.5)
CALCIUM SERPL-MCNC: 9.4 MG/DL (ref 8.5–10.5)
CHLORIDE SERPL-SCNC: 96 MMOL/L (ref 96–112)
CO2 SERPL-SCNC: 24 MMOL/L (ref 20–33)
COLOR UR: YELLOW
CREAT SERPL-MCNC: 0.68 MG/DL (ref 0.5–1.4)
ERYTHROCYTE [DISTWIDTH] IN BLOOD BY AUTOMATED COUNT: 45.5 FL (ref 35.9–50)
GFR SERPLBLD CREATININE-BSD FMLA CKD-EPI: 107 ML/MIN/1.73 M 2
GLOBULIN SER CALC-MCNC: 6.4 G/DL (ref 1.9–3.5)
GLUCOSE SERPL-MCNC: 108 MG/DL (ref 65–99)
GLUCOSE UR STRIP.AUTO-MCNC: >=1000 MG/DL
HCT VFR BLD AUTO: 39.2 % (ref 42–52)
HGB BLD-MCNC: 12.6 G/DL (ref 14–18)
KETONES UR STRIP.AUTO-MCNC: NEGATIVE MG/DL
LEUKOCYTE ESTERASE UR QL STRIP.AUTO: NEGATIVE
MCH RBC QN AUTO: 30.1 PG (ref 27–33)
MCHC RBC AUTO-ENTMCNC: 32.1 G/DL (ref 32.3–36.5)
MCV RBC AUTO: 93.6 FL (ref 81.4–97.8)
MICRO URNS: ABNORMAL
NITRITE UR QL STRIP.AUTO: NEGATIVE
PH UR STRIP.AUTO: 6 [PH] (ref 5–8)
PLATELET # BLD AUTO: 653 K/UL (ref 164–446)
PMV BLD AUTO: 9.6 FL (ref 9–12.9)
POTASSIUM SERPL-SCNC: 4.5 MMOL/L (ref 3.6–5.5)
PROT SERPL-MCNC: 9.7 G/DL (ref 6–8.2)
PROT UR QL STRIP: NEGATIVE MG/DL
RBC # BLD AUTO: 4.19 M/UL (ref 4.7–6.1)
RBC UR QL AUTO: NEGATIVE
SODIUM SERPL-SCNC: 133 MMOL/L (ref 135–145)
SP GR UR STRIP.AUTO: 1.04
UROBILINOGEN UR STRIP.AUTO-MCNC: 0.2 MG/DL
WBC # BLD AUTO: 15.2 K/UL (ref 4.8–10.8)

## 2024-01-24 PROCEDURE — 80053 COMPREHEN METABOLIC PANEL: CPT

## 2024-01-24 PROCEDURE — 85027 COMPLETE CBC AUTOMATED: CPT

## 2024-01-24 PROCEDURE — 36415 COLL VENOUS BLD VENIPUNCTURE: CPT

## 2024-01-24 PROCEDURE — 81003 URINALYSIS AUTO W/O SCOPE: CPT

## 2024-01-24 RX ORDER — AMOXICILLIN 500 MG/1
500 CAPSULE ORAL 3 TIMES DAILY
COMMUNITY
End: 2024-03-06

## 2024-01-24 RX ORDER — TIRZEPATIDE 2.5 MG/.5ML
INJECTION, SOLUTION SUBCUTANEOUS
COMMUNITY
End: 2024-02-27

## 2024-01-24 ASSESSMENT — FIBROSIS 4 INDEX: FIB4 SCORE: 0.81

## 2024-02-08 ENCOUNTER — HOSPITAL ENCOUNTER (OUTPATIENT)
Dept: LAB | Facility: MEDICAL CENTER | Age: 59
End: 2024-02-08
Attending: FAMILY MEDICINE
Payer: COMMERCIAL

## 2024-02-08 ENCOUNTER — HOSPITAL ENCOUNTER (OUTPATIENT)
Dept: RADIOLOGY | Facility: MEDICAL CENTER | Age: 59
End: 2024-02-08
Attending: FAMILY MEDICINE
Payer: COMMERCIAL

## 2024-02-08 DIAGNOSIS — E88.09 HYPERPROTEINEMIA: ICD-10-CM

## 2024-02-08 DIAGNOSIS — M54.50 ACUTE MIDLINE LOW BACK PAIN WITHOUT SCIATICA: ICD-10-CM

## 2024-02-08 DIAGNOSIS — D64.9 ANEMIA, UNSPECIFIED TYPE: ICD-10-CM

## 2024-02-08 DIAGNOSIS — D75.839 THROMBOCYTOSIS: ICD-10-CM

## 2024-02-08 LAB
ALBUMIN SERPL BCP-MCNC: 3.6 G/DL (ref 3.2–4.9)
ALBUMIN/GLOB SERPL: 0.8 G/DL
ALP SERPL-CCNC: 89 U/L (ref 30–99)
ALT SERPL-CCNC: 21 U/L (ref 2–50)
ANION GAP SERPL CALC-SCNC: 14 MMOL/L (ref 7–16)
AST SERPL-CCNC: 20 U/L (ref 12–45)
BASOPHILS # BLD AUTO: 0.2 % (ref 0–1.8)
BASOPHILS # BLD: 0.02 K/UL (ref 0–0.12)
BILIRUB SERPL-MCNC: 0.3 MG/DL (ref 0.1–1.5)
BUN SERPL-MCNC: 13 MG/DL (ref 8–22)
CALCIUM ALBUM COR SERPL-MCNC: 9.5 MG/DL (ref 8.5–10.5)
CALCIUM SERPL-MCNC: 9.2 MG/DL (ref 8.5–10.5)
CHLORIDE SERPL-SCNC: 100 MMOL/L (ref 96–112)
CO2 SERPL-SCNC: 21 MMOL/L (ref 20–33)
CREAT SERPL-MCNC: 0.77 MG/DL (ref 0.5–1.4)
CRP SERPL HS-MCNC: 50.5 MG/L (ref 0–3)
EOSINOPHIL # BLD AUTO: 0.14 K/UL (ref 0–0.51)
EOSINOPHIL NFR BLD: 1.6 % (ref 0–6.9)
ERYTHROCYTE [DISTWIDTH] IN BLOOD BY AUTOMATED COUNT: 52.1 FL (ref 35.9–50)
GFR SERPLBLD CREATININE-BSD FMLA CKD-EPI: 103 ML/MIN/1.73 M 2
GLOBULIN SER CALC-MCNC: 4.6 G/DL (ref 1.9–3.5)
GLUCOSE SERPL-MCNC: 108 MG/DL (ref 65–99)
HCT VFR BLD AUTO: 23.7 % (ref 42–52)
HGB BLD-MCNC: 7.2 G/DL (ref 14–18)
IMM GRANULOCYTES # BLD AUTO: 0.08 K/UL (ref 0–0.11)
IMM GRANULOCYTES NFR BLD AUTO: 0.9 % (ref 0–0.9)
LYMPHOCYTES # BLD AUTO: 2.32 K/UL (ref 1–4.8)
LYMPHOCYTES NFR BLD: 26 % (ref 22–41)
MCH RBC QN AUTO: 29.3 PG (ref 27–33)
MCHC RBC AUTO-ENTMCNC: 30.4 G/DL (ref 32.3–36.5)
MCV RBC AUTO: 96.3 FL (ref 81.4–97.8)
MONOCYTES # BLD AUTO: 0.9 K/UL (ref 0–0.85)
MONOCYTES NFR BLD AUTO: 10.1 % (ref 0–13.4)
NEUTROPHILS # BLD AUTO: 5.46 K/UL (ref 1.82–7.42)
NEUTROPHILS NFR BLD: 61.2 % (ref 44–72)
NRBC # BLD AUTO: 0 K/UL
NRBC BLD-RTO: 0 /100 WBC (ref 0–0.2)
PLATELET # BLD AUTO: 463 K/UL (ref 164–446)
PMV BLD AUTO: 9.7 FL (ref 9–12.9)
POTASSIUM SERPL-SCNC: 4.1 MMOL/L (ref 3.6–5.5)
PROT SERPL-MCNC: 8.2 G/DL (ref 6–8.2)
RBC # BLD AUTO: 2.46 M/UL (ref 4.7–6.1)
SODIUM SERPL-SCNC: 135 MMOL/L (ref 135–145)
WBC # BLD AUTO: 8.9 K/UL (ref 4.8–10.8)

## 2024-02-08 PROCEDURE — 84165 PROTEIN E-PHORESIS SERUM: CPT

## 2024-02-08 PROCEDURE — 85025 COMPLETE CBC W/AUTO DIFF WBC: CPT

## 2024-02-08 PROCEDURE — 72100 X-RAY EXAM L-S SPINE 2/3 VWS: CPT

## 2024-02-08 PROCEDURE — 80053 COMPREHEN METABOLIC PANEL: CPT

## 2024-02-08 PROCEDURE — 86141 C-REACTIVE PROTEIN HS: CPT

## 2024-02-08 PROCEDURE — 84155 ASSAY OF PROTEIN SERUM: CPT

## 2024-02-08 PROCEDURE — 36415 COLL VENOUS BLD VENIPUNCTURE: CPT

## 2024-02-12 LAB
ALBUMIN SERPL ELPH-MCNC: 3.16 G/DL (ref 3.75–5.01)
ALPHA1 GLOB SERPL ELPH-MCNC: 0.55 G/DL (ref 0.19–0.46)
ALPHA2 GLOB SERPL ELPH-MCNC: 1.03 G/DL (ref 0.48–1.05)
B-GLOBULIN SERPL ELPH-MCNC: 0.95 G/DL (ref 0.48–1.1)
EER PROT ELECT SER Q1092: ABNORMAL
GAMMA GLOB SERPL ELPH-MCNC: 2.21 G/DL (ref 0.62–1.51)
INTERPRETATION SERPL IFE-IMP: ABNORMAL
MONOCLONAL PROTEIN NL11656: ABNORMAL G/DL
PROT SERPL-MCNC: 7.9 G/DL (ref 6.3–8.2)

## 2024-02-16 ENCOUNTER — APPOINTMENT (OUTPATIENT)
Dept: RADIOLOGY | Facility: MEDICAL CENTER | Age: 59
End: 2024-02-16
Attending: FAMILY MEDICINE
Payer: COMMERCIAL

## 2024-02-28 ENCOUNTER — APPOINTMENT (OUTPATIENT)
Dept: ADMISSIONS | Facility: MEDICAL CENTER | Age: 59
End: 2024-02-28
Attending: ORTHOPAEDIC SURGERY
Payer: COMMERCIAL

## 2024-03-06 ENCOUNTER — PRE-ADMISSION TESTING (OUTPATIENT)
Dept: ADMISSIONS | Facility: MEDICAL CENTER | Age: 59
End: 2024-03-06
Attending: ORTHOPAEDIC SURGERY
Payer: COMMERCIAL

## 2024-03-06 VITALS — BODY MASS INDEX: 39.97 KG/M2 | HEIGHT: 71 IN

## 2024-03-06 RX ORDER — OXYCODONE HYDROCHLORIDE AND ACETAMINOPHEN 5; 325 MG/1; MG/1
1 TABLET ORAL EVERY 8 HOURS PRN
COMMUNITY
Start: 2024-02-13

## 2024-03-06 NOTE — PREPROCEDURE INSTRUCTIONS
Pre-admit telephone appointment completed. Reviewed the Preparing for your procedure handout with patient over the phone. Patient instructed per pharmacy guidelines regarding taking, holding, or contacting provider for instructions on regularly prescribed medications before surgery. Instructed to take the following medications the day of surgery with a sip of water per pharmacy medication guidelines: Metoprolol, Allopurinol, Pravastatin, PRN: percocet    Denies anesthesia complications

## 2024-04-08 ENCOUNTER — ANESTHESIA EVENT (OUTPATIENT)
Dept: SURGERY | Facility: MEDICAL CENTER | Age: 59
End: 2024-04-08
Payer: COMMERCIAL

## 2024-04-09 ENCOUNTER — HOSPITAL ENCOUNTER (OUTPATIENT)
Facility: MEDICAL CENTER | Age: 59
End: 2024-04-09
Attending: ORTHOPAEDIC SURGERY | Admitting: ORTHOPAEDIC SURGERY
Payer: COMMERCIAL

## 2024-04-09 ENCOUNTER — ANESTHESIA (OUTPATIENT)
Dept: SURGERY | Facility: MEDICAL CENTER | Age: 59
End: 2024-04-09
Payer: COMMERCIAL

## 2024-04-09 VITALS
OXYGEN SATURATION: 92 % | WEIGHT: 289.79 LBS | SYSTOLIC BLOOD PRESSURE: 102 MMHG | HEART RATE: 60 BPM | HEIGHT: 72 IN | RESPIRATION RATE: 18 BRPM | DIASTOLIC BLOOD PRESSURE: 62 MMHG | BODY MASS INDEX: 39.25 KG/M2 | TEMPERATURE: 98.2 F

## 2024-04-09 DIAGNOSIS — G89.18 POSTOPERATIVE PAIN: ICD-10-CM

## 2024-04-09 LAB
ANION GAP SERPL CALC-SCNC: 9 MMOL/L (ref 7–16)
BUN SERPL-MCNC: 15 MG/DL (ref 8–22)
CALCIUM SERPL-MCNC: 9.4 MG/DL (ref 8.4–10.2)
CHLORIDE SERPL-SCNC: 103 MMOL/L (ref 96–112)
CO2 SERPL-SCNC: 24 MMOL/L (ref 20–33)
CREAT SERPL-MCNC: 0.51 MG/DL (ref 0.5–1.4)
ERYTHROCYTE [DISTWIDTH] IN BLOOD BY AUTOMATED COUNT: 49.9 FL (ref 35.9–50)
GFR SERPLBLD CREATININE-BSD FMLA CKD-EPI: 116 ML/MIN/1.73 M 2
GLUCOSE BLD STRIP.AUTO-MCNC: 137 MG/DL (ref 65–99)
GLUCOSE SERPL-MCNC: 148 MG/DL (ref 65–99)
HCT VFR BLD AUTO: 35.2 % (ref 42–52)
HGB BLD-MCNC: 10.2 G/DL (ref 14–18)
MCH RBC QN AUTO: 22.4 PG (ref 27–33)
MCHC RBC AUTO-ENTMCNC: 29 G/DL (ref 32.3–36.5)
MCV RBC AUTO: 77.4 FL (ref 81.4–97.8)
PLATELET # BLD AUTO: 424 K/UL (ref 164–446)
PMV BLD AUTO: 8.9 FL (ref 9–12.9)
POTASSIUM SERPL-SCNC: 4.2 MMOL/L (ref 3.6–5.5)
RBC # BLD AUTO: 4.55 M/UL (ref 4.7–6.1)
SODIUM SERPL-SCNC: 136 MMOL/L (ref 135–145)
WBC # BLD AUTO: 8.2 K/UL (ref 4.8–10.8)

## 2024-04-09 PROCEDURE — 29828 SHO ARTHRS SRG BICP TENODSIS: CPT | Mod: RT | Performed by: ORTHOPAEDIC SURGERY

## 2024-04-09 PROCEDURE — 160009 HCHG ANES TIME/MIN: Performed by: ORTHOPAEDIC SURGERY

## 2024-04-09 PROCEDURE — 36415 COLL VENOUS BLD VENIPUNCTURE: CPT

## 2024-04-09 PROCEDURE — 700111 HCHG RX REV CODE 636 W/ 250 OVERRIDE (IP): Performed by: PHYSICIAN ASSISTANT

## 2024-04-09 PROCEDURE — 502000 HCHG MISC OR IMPLANTS RC 0278: Performed by: ORTHOPAEDIC SURGERY

## 2024-04-09 PROCEDURE — 29823 SHO ARTHRS SRG XTNSV DBRDMT: CPT | Mod: ASROC,RT | Performed by: PHYSICIAN ASSISTANT

## 2024-04-09 PROCEDURE — 82962 GLUCOSE BLOOD TEST: CPT

## 2024-04-09 PROCEDURE — 160036 HCHG PACU - EA ADDL 30 MINS PHASE I: Performed by: ORTHOPAEDIC SURGERY

## 2024-04-09 PROCEDURE — 700101 HCHG RX REV CODE 250: Performed by: STUDENT IN AN ORGANIZED HEALTH CARE EDUCATION/TRAINING PROGRAM

## 2024-04-09 PROCEDURE — 29826 SHO ARTHRS SRG DECOMPRESSION: CPT | Mod: ASROC,RT | Performed by: PHYSICIAN ASSISTANT

## 2024-04-09 PROCEDURE — 160046 HCHG PACU - 1ST 60 MINS PHASE II: Performed by: ORTHOPAEDIC SURGERY

## 2024-04-09 PROCEDURE — C1713 ANCHOR/SCREW BN/BN,TIS/BN: HCPCS | Performed by: ORTHOPAEDIC SURGERY

## 2024-04-09 PROCEDURE — 29827 SHO ARTHRS SRG RT8TR CUF RPR: CPT | Mod: RT | Performed by: ORTHOPAEDIC SURGERY

## 2024-04-09 PROCEDURE — 160025 RECOVERY II MINUTES (STATS): Performed by: ORTHOPAEDIC SURGERY

## 2024-04-09 PROCEDURE — 29828 SHO ARTHRS SRG BICP TENODSIS: CPT | Mod: ASROC,RT | Performed by: PHYSICIAN ASSISTANT

## 2024-04-09 PROCEDURE — 29824 SHO ARTHRS SRG DSTL CLAVICLC: CPT | Mod: ASROC,RT | Performed by: PHYSICIAN ASSISTANT

## 2024-04-09 PROCEDURE — 700101 HCHG RX REV CODE 250: Performed by: ORTHOPAEDIC SURGERY

## 2024-04-09 PROCEDURE — 160035 HCHG PACU - 1ST 60 MINS PHASE I: Performed by: ORTHOPAEDIC SURGERY

## 2024-04-09 PROCEDURE — 160002 HCHG RECOVERY MINUTES (STAT): Performed by: ORTHOPAEDIC SURGERY

## 2024-04-09 PROCEDURE — 700111 HCHG RX REV CODE 636 W/ 250 OVERRIDE (IP): Mod: JZ | Performed by: STUDENT IN AN ORGANIZED HEALTH CARE EDUCATION/TRAINING PROGRAM

## 2024-04-09 PROCEDURE — 700102 HCHG RX REV CODE 250 W/ 637 OVERRIDE(OP): Performed by: STUDENT IN AN ORGANIZED HEALTH CARE EDUCATION/TRAINING PROGRAM

## 2024-04-09 PROCEDURE — 29823 SHO ARTHRS SRG XTNSV DBRDMT: CPT | Mod: RT | Performed by: ORTHOPAEDIC SURGERY

## 2024-04-09 PROCEDURE — 160029 HCHG SURGERY MINUTES - 1ST 30 MINS LEVEL 4: Performed by: ORTHOPAEDIC SURGERY

## 2024-04-09 PROCEDURE — 29826 SHO ARTHRS SRG DECOMPRESSION: CPT | Mod: RT | Performed by: ORTHOPAEDIC SURGERY

## 2024-04-09 PROCEDURE — 80048 BASIC METABOLIC PNL TOTAL CA: CPT

## 2024-04-09 PROCEDURE — 700105 HCHG RX REV CODE 258: Performed by: ORTHOPAEDIC SURGERY

## 2024-04-09 PROCEDURE — 160041 HCHG SURGERY MINUTES - EA ADDL 1 MIN LEVEL 4: Performed by: ORTHOPAEDIC SURGERY

## 2024-04-09 PROCEDURE — 29827 SHO ARTHRS SRG RT8TR CUF RPR: CPT | Mod: ASROC,RT | Performed by: PHYSICIAN ASSISTANT

## 2024-04-09 PROCEDURE — 85027 COMPLETE CBC AUTOMATED: CPT

## 2024-04-09 PROCEDURE — 29824 SHO ARTHRS SRG DSTL CLAVICLC: CPT | Mod: RT | Performed by: ORTHOPAEDIC SURGERY

## 2024-04-09 PROCEDURE — 64415 NJX AA&/STRD BRCH PLXS IMG: CPT | Performed by: ORTHOPAEDIC SURGERY

## 2024-04-09 PROCEDURE — A9270 NON-COVERED ITEM OR SERVICE: HCPCS | Performed by: STUDENT IN AN ORGANIZED HEALTH CARE EDUCATION/TRAINING PROGRAM

## 2024-04-09 PROCEDURE — 160048 HCHG OR STATISTICAL LEVEL 1-5: Performed by: ORTHOPAEDIC SURGERY

## 2024-04-09 DEVICE — IMPLANTABLE DEVICE: Type: IMPLANTABLE DEVICE | Status: FUNCTIONAL

## 2024-04-09 DEVICE — ANCHOR ICONIX 1-#2 FORCEFIBER 1.4MM (5EA/BX): Type: IMPLANTABLE DEVICE | Status: FUNCTIONAL

## 2024-04-09 DEVICE — ANCHOR SUTURE ICONIX 1 WITH XBRAID 1.2MM 1.4MM (5EA/BX): Type: IMPLANTABLE DEVICE | Status: FUNCTIONAL

## 2024-04-09 RX ORDER — DEXAMETHASONE SODIUM PHOSPHATE 4 MG/ML
INJECTION, SOLUTION INTRA-ARTICULAR; INTRALESIONAL; INTRAMUSCULAR; INTRAVENOUS; SOFT TISSUE PRN
Status: DISCONTINUED | OUTPATIENT
Start: 2024-04-09 | End: 2024-04-09 | Stop reason: SURG

## 2024-04-09 RX ORDER — MEPERIDINE HYDROCHLORIDE 25 MG/ML
6.25 INJECTION INTRAMUSCULAR; INTRAVENOUS; SUBCUTANEOUS
Status: DISCONTINUED | OUTPATIENT
Start: 2024-04-09 | End: 2024-04-09 | Stop reason: HOSPADM

## 2024-04-09 RX ORDER — HYDROMORPHONE HYDROCHLORIDE 1 MG/ML
0.4 INJECTION, SOLUTION INTRAMUSCULAR; INTRAVENOUS; SUBCUTANEOUS
Status: DISCONTINUED | OUTPATIENT
Start: 2024-04-09 | End: 2024-04-09 | Stop reason: HOSPADM

## 2024-04-09 RX ORDER — OXYCODONE HYDROCHLORIDE 5 MG/1
5 TABLET ORAL EVERY 4 HOURS PRN
Qty: 30 TABLET | Refills: 0 | Status: SHIPPED | OUTPATIENT
Start: 2024-04-09 | End: 2024-04-14

## 2024-04-09 RX ORDER — BUPIVACAINE HYDROCHLORIDE AND EPINEPHRINE 5; 5 MG/ML; UG/ML
INJECTION, SOLUTION EPIDURAL; INTRACAUDAL; PERINEURAL
Status: DISCONTINUED | OUTPATIENT
Start: 2024-04-09 | End: 2024-04-09 | Stop reason: HOSPADM

## 2024-04-09 RX ORDER — LABETALOL HYDROCHLORIDE 5 MG/ML
5 INJECTION, SOLUTION INTRAVENOUS
Status: DISCONTINUED | OUTPATIENT
Start: 2024-04-09 | End: 2024-04-09 | Stop reason: HOSPADM

## 2024-04-09 RX ORDER — HYDRALAZINE HYDROCHLORIDE 20 MG/ML
5 INJECTION INTRAMUSCULAR; INTRAVENOUS
Status: DISCONTINUED | OUTPATIENT
Start: 2024-04-09 | End: 2024-04-09 | Stop reason: HOSPADM

## 2024-04-09 RX ORDER — SODIUM CHLORIDE, SODIUM LACTATE, POTASSIUM CHLORIDE, CALCIUM CHLORIDE 600; 310; 30; 20 MG/100ML; MG/100ML; MG/100ML; MG/100ML
INJECTION, SOLUTION INTRAVENOUS CONTINUOUS
Status: ACTIVE | OUTPATIENT
Start: 2024-04-09 | End: 2024-04-09

## 2024-04-09 RX ORDER — DIPHENHYDRAMINE HYDROCHLORIDE 50 MG/ML
INJECTION INTRAMUSCULAR; INTRAVENOUS PRN
Status: DISCONTINUED | OUTPATIENT
Start: 2024-04-09 | End: 2024-04-09 | Stop reason: SURG

## 2024-04-09 RX ORDER — OXYCODONE HCL 5 MG/5 ML
10 SOLUTION, ORAL ORAL
Status: DISCONTINUED | OUTPATIENT
Start: 2024-04-09 | End: 2024-04-09 | Stop reason: HOSPADM

## 2024-04-09 RX ORDER — DIPHENHYDRAMINE HYDROCHLORIDE 50 MG/ML
12.5 INJECTION INTRAMUSCULAR; INTRAVENOUS
Status: DISCONTINUED | OUTPATIENT
Start: 2024-04-09 | End: 2024-04-09 | Stop reason: HOSPADM

## 2024-04-09 RX ORDER — BUPIVACAINE HYDROCHLORIDE 5 MG/ML
INJECTION, SOLUTION EPIDURAL; INTRACAUDAL PRN
Status: DISCONTINUED | OUTPATIENT
Start: 2024-04-09 | End: 2024-04-09 | Stop reason: SURG

## 2024-04-09 RX ORDER — OXYCODONE HCL 5 MG/5 ML
5 SOLUTION, ORAL ORAL
Status: DISCONTINUED | OUTPATIENT
Start: 2024-04-09 | End: 2024-04-09 | Stop reason: HOSPADM

## 2024-04-09 RX ORDER — MIDAZOLAM HYDROCHLORIDE 1 MG/ML
INJECTION INTRAMUSCULAR; INTRAVENOUS PRN
Status: DISCONTINUED | OUTPATIENT
Start: 2024-04-09 | End: 2024-04-09 | Stop reason: SURG

## 2024-04-09 RX ORDER — ACETAMINOPHEN 500 MG
1000 TABLET ORAL ONCE
Status: COMPLETED | OUTPATIENT
Start: 2024-04-09 | End: 2024-04-09

## 2024-04-09 RX ORDER — METOPROLOL TARTRATE 1 MG/ML
1 INJECTION, SOLUTION INTRAVENOUS
Status: DISCONTINUED | OUTPATIENT
Start: 2024-04-09 | End: 2024-04-09 | Stop reason: HOSPADM

## 2024-04-09 RX ORDER — LIDOCAINE HYDROCHLORIDE AND EPINEPHRINE 10; 10 MG/ML; UG/ML
INJECTION, SOLUTION INFILTRATION; PERINEURAL
Status: DISCONTINUED | OUTPATIENT
Start: 2024-04-09 | End: 2024-04-09 | Stop reason: HOSPADM

## 2024-04-09 RX ORDER — EPHEDRINE SULFATE 50 MG/ML
5 INJECTION, SOLUTION INTRAVENOUS
Status: DISCONTINUED | OUTPATIENT
Start: 2024-04-09 | End: 2024-04-09 | Stop reason: HOSPADM

## 2024-04-09 RX ORDER — ROCURONIUM BROMIDE 10 MG/ML
INJECTION, SOLUTION INTRAVENOUS PRN
Status: DISCONTINUED | OUTPATIENT
Start: 2024-04-09 | End: 2024-04-09 | Stop reason: SURG

## 2024-04-09 RX ORDER — DEXTROSE MONOHYDRATE 25 G/50ML
25 INJECTION, SOLUTION INTRAVENOUS
Status: DISCONTINUED | OUTPATIENT
Start: 2024-04-09 | End: 2024-04-09 | Stop reason: HOSPADM

## 2024-04-09 RX ORDER — CEFAZOLIN SODIUM 1 G/3ML
3 INJECTION, POWDER, FOR SOLUTION INTRAMUSCULAR; INTRAVENOUS ONCE
Status: COMPLETED | OUTPATIENT
Start: 2024-04-09 | End: 2024-04-09

## 2024-04-09 RX ORDER — ONDANSETRON 2 MG/ML
4 INJECTION INTRAMUSCULAR; INTRAVENOUS
Status: DISCONTINUED | OUTPATIENT
Start: 2024-04-09 | End: 2024-04-09 | Stop reason: HOSPADM

## 2024-04-09 RX ORDER — TRANEXAMIC ACID 100 MG/ML
INJECTION, SOLUTION INTRAVENOUS PRN
Status: DISCONTINUED | OUTPATIENT
Start: 2024-04-09 | End: 2024-04-09 | Stop reason: SURG

## 2024-04-09 RX ORDER — METOCLOPRAMIDE HYDROCHLORIDE 5 MG/ML
INJECTION INTRAMUSCULAR; INTRAVENOUS PRN
Status: DISCONTINUED | OUTPATIENT
Start: 2024-04-09 | End: 2024-04-09 | Stop reason: SURG

## 2024-04-09 RX ORDER — HYDROMORPHONE HYDROCHLORIDE 1 MG/ML
0.1 INJECTION, SOLUTION INTRAMUSCULAR; INTRAVENOUS; SUBCUTANEOUS
Status: DISCONTINUED | OUTPATIENT
Start: 2024-04-09 | End: 2024-04-09 | Stop reason: HOSPADM

## 2024-04-09 RX ORDER — SODIUM CHLORIDE, SODIUM LACTATE, POTASSIUM CHLORIDE, CALCIUM CHLORIDE 600; 310; 30; 20 MG/100ML; MG/100ML; MG/100ML; MG/100ML
INJECTION, SOLUTION INTRAVENOUS CONTINUOUS
Status: DISCONTINUED | OUTPATIENT
Start: 2024-04-09 | End: 2024-04-09 | Stop reason: HOSPADM

## 2024-04-09 RX ORDER — HYDROMORPHONE HYDROCHLORIDE 1 MG/ML
0.2 INJECTION, SOLUTION INTRAMUSCULAR; INTRAVENOUS; SUBCUTANEOUS
Status: DISCONTINUED | OUTPATIENT
Start: 2024-04-09 | End: 2024-04-09 | Stop reason: HOSPADM

## 2024-04-09 RX ORDER — LIDOCAINE HYDROCHLORIDE 20 MG/ML
INJECTION, SOLUTION EPIDURAL; INFILTRATION; INTRACAUDAL; PERINEURAL PRN
Status: DISCONTINUED | OUTPATIENT
Start: 2024-04-09 | End: 2024-04-09 | Stop reason: SURG

## 2024-04-09 RX ORDER — HALOPERIDOL 5 MG/ML
1 INJECTION INTRAMUSCULAR
Status: DISCONTINUED | OUTPATIENT
Start: 2024-04-09 | End: 2024-04-09 | Stop reason: HOSPADM

## 2024-04-09 RX ADMIN — DEXAMETHASONE SODIUM PHOSPHATE 8 MG: 4 INJECTION INTRA-ARTICULAR; INTRALESIONAL; INTRAMUSCULAR; INTRAVENOUS; SOFT TISSUE at 11:08

## 2024-04-09 RX ADMIN — ROCURONIUM BROMIDE 20 MG: 50 INJECTION, SOLUTION INTRAVENOUS at 11:57

## 2024-04-09 RX ADMIN — ROCURONIUM BROMIDE 10 MG: 50 INJECTION, SOLUTION INTRAVENOUS at 12:29

## 2024-04-09 RX ADMIN — ROCURONIUM BROMIDE 10 MG: 50 INJECTION, SOLUTION INTRAVENOUS at 12:14

## 2024-04-09 RX ADMIN — ROCURONIUM BROMIDE 50 MG: 50 INJECTION, SOLUTION INTRAVENOUS at 11:03

## 2024-04-09 RX ADMIN — FENTANYL CITRATE 50 MCG: 50 INJECTION, SOLUTION INTRAMUSCULAR; INTRAVENOUS at 11:03

## 2024-04-09 RX ADMIN — PROPOFOL 50 MG: 10 INJECTION, EMULSION INTRAVENOUS at 11:39

## 2024-04-09 RX ADMIN — CEFAZOLIN 3 G: 1 INJECTION, POWDER, FOR SOLUTION INTRAMUSCULAR; INTRAVENOUS at 11:08

## 2024-04-09 RX ADMIN — PROPOFOL 50 MG: 10 INJECTION, EMULSION INTRAVENOUS at 12:53

## 2024-04-09 RX ADMIN — METOCLOPRAMIDE 10 MG: 5 INJECTION, SOLUTION INTRAMUSCULAR; INTRAVENOUS at 12:18

## 2024-04-09 RX ADMIN — SUGAMMADEX 200 MG: 100 INJECTION, SOLUTION INTRAVENOUS at 12:53

## 2024-04-09 RX ADMIN — ROCURONIUM BROMIDE 10 MG: 50 INJECTION, SOLUTION INTRAVENOUS at 12:43

## 2024-04-09 RX ADMIN — DIPHENHYDRAMINE HYDROCHLORIDE 12.5 MG: 50 INJECTION, SOLUTION INTRAMUSCULAR; INTRAVENOUS at 12:20

## 2024-04-09 RX ADMIN — PROPOFOL 40 MG: 10 INJECTION, EMULSION INTRAVENOUS at 12:56

## 2024-04-09 RX ADMIN — SODIUM CHLORIDE, POTASSIUM CHLORIDE, SODIUM LACTATE AND CALCIUM CHLORIDE: 600; 310; 30; 20 INJECTION, SOLUTION INTRAVENOUS at 09:41

## 2024-04-09 RX ADMIN — LIDOCAINE HYDROCHLORIDE 100 MG: 20 INJECTION, SOLUTION EPIDURAL; INFILTRATION; INTRACAUDAL at 11:03

## 2024-04-09 RX ADMIN — ROCURONIUM BROMIDE 20 MG: 50 INJECTION, SOLUTION INTRAVENOUS at 11:40

## 2024-04-09 RX ADMIN — SUGAMMADEX 200 MG: 100 INJECTION, SOLUTION INTRAVENOUS at 13:01

## 2024-04-09 RX ADMIN — LIDOCAINE HYDROCHLORIDE 0.5 ML: 10 INJECTION, SOLUTION EPIDURAL; INFILTRATION; INTRACAUDAL at 09:41

## 2024-04-09 RX ADMIN — FENTANYL CITRATE 50 MCG: 50 INJECTION, SOLUTION INTRAMUSCULAR; INTRAVENOUS at 12:15

## 2024-04-09 RX ADMIN — BUPIVACAINE HYDROCHLORIDE 17 MG: 5 INJECTION, SOLUTION EPIDURAL; INTRACAUDAL at 10:51

## 2024-04-09 RX ADMIN — TRANEXAMIC ACID 1000 MG: 100 INJECTION, SOLUTION INTRAVENOUS at 11:10

## 2024-04-09 RX ADMIN — PROPOFOL 200 MG: 10 INJECTION, EMULSION INTRAVENOUS at 11:03

## 2024-04-09 RX ADMIN — MIDAZOLAM HYDROCHLORIDE 2 MG: 1 INJECTION, SOLUTION INTRAMUSCULAR; INTRAVENOUS at 11:00

## 2024-04-09 RX ADMIN — ACETAMINOPHEN 1000 MG: 500 TABLET ORAL at 09:35

## 2024-04-09 ASSESSMENT — PAIN DESCRIPTION - PAIN TYPE
TYPE: SURGICAL PAIN
TYPE: SURGICAL PAIN
TYPE: CHRONIC PAIN

## 2024-04-09 ASSESSMENT — FIBROSIS 4 INDEX
FIB4 SCORE: 0.56
FIB4 SCORE: 0.56

## 2024-04-09 NOTE — ANESTHESIA PROCEDURE NOTES
Peripheral Block    Date/Time: 4/9/2024 10:51 AM    Performed by: Jairo Parra D.O.  Authorized by: Jairo Parra D.O.    Patient Location:  Pre-op  Start Time:  4/9/2024 10:51 AM  Reason for Block: at surgeon's request and post-op pain management ONLY    patient identified, IV checked, site marked, risks and benefits discussed, surgical consent, monitors and equipment checked, pre-op evaluation and timeout performed    Patient Position:  Supine  Prep: ChloraPrep    Monitoring:  Heart rate, continuous pulse ox and cardiac monitor  Block Region:  Upper Extremity  Upper Extremity - Block Type:  BRACHIAL PLEXUS block, Interscalene approach    Laterality:  Right  Procedures: ultrasound guided  Image captured, interpreted and electronically stored.  Local Infiltration:  Lidocaine  Strength:  1 %  Dose:  3 ml  Block Type:  Single-shot  Needle Length:  50mm  Needle Gauge:  22 G  Needle Localization:  Ultrasound guidance  Ultrasound picture in chart  Injection Assessment:  Negative aspiration for heme, no paresthesia on injection, incremental injection and local visualized surrounding nerve on ultrasound  Evidence of intravascular injection: No     US Guided Interscalene Brachial Plexus Block   US transducer placed on the neck in oblique plane approximately at the level of C6.  Anterior and Middle Scalene (MSM) muscles identified with nerve trunks identified between the muscles.  Needle inserted lateral to probe and advanced under direct visualization through the MSM into a perineural position.  After negative aspiration, LA injected with ease and visualized surrounding the nerve trunks.

## 2024-04-09 NOTE — ANESTHESIA PROCEDURE NOTES
Airway    Date/Time: 4/9/2024 11:05 AM    Performed by: Jairo Parra D.O.  Authorized by: Jairo Parra D.O.    Location:  OR  Urgency:  Elective  Difficult Airway: No    Indications for Airway Management:  Anesthesia      Spontaneous Ventilation: absent    Sedation Level:  Deep  Preoxygenated: Yes    Patient Position:  Sniffing  MILS Maintained Throughout: No    Mask Difficulty Assessment:  2 - vent by mask + OA or adjuvant +/- NMBA  Final Airway Type:  Endotracheal airway  Final Endotracheal Airway:  ETT  Cuffed: Yes    Technique Used for Successful ETT Placement:  Direct laryngoscopy  Devices/Methods Used in Placement:  Anterior pressure/BURP    Insertion Site:  Oral  Blade Type:  Elvis  Laryngoscope Blade/Videolaryngoscope Blade Size:  4  ETT Size (mm):  7.5  Measured from:  Teeth  ETT to Teeth (cm):  23  Placement Verified by: auscultation and capnometry    Cormack-Lehane Classification:  Grade I - full view of glottis  Number of Attempts at Approach:  1  Ventilation Between Attempts:  None  Number of Other Approaches Attempted:  0

## 2024-04-09 NOTE — OR NURSING
1455: Pt ambulating to stage 2 with stby of CNA, steady gait. R shoulder dressing CDI, radial pulse +3, cap refill <3 secs, RUE immobilizer in place. Pt denies pain and nausea.     1500: Wife at chairside. Pt getting dressed and instructed on how to place on immobilizer.     1515: Patient education completed, family denies further questions.DC'd to care of family post uneventful stay in PACU 2. IV discontinued.

## 2024-04-09 NOTE — DISCHARGE INSTRUCTIONS
If any questions arise, call your provider.  If your provider is not available, please feel free to call the Surgical Center at [phone].    MEDICATIONS: Resume taking daily medication.  Take prescribed pain medication with food.  If no medication is prescribed, you may take non-aspirin pain medication if needed.  PAIN MEDICATION CAN BE VERY CONSTIPATING.  Take a stool softener or laxative such as senokot, pericolace, or milk of magnesia if needed.    Last pain medication given at     What to Expect Post Anesthesia    Rest and take it easy for the first 24 hours.  A responsible adult is recommended to remain with you during that time.  It is normal to feel sleepy.  We encourage you to not do anything that requires balance, judgment or coordination.    FOR 24 HOURS DO NOT:  Drive, operate machinery or run household appliances.  Drink beer or alcoholic beverages.  Make important decisions or sign legal documents.    To avoid nausea, slowly advance diet as tolerated, avoiding spicy or greasy foods for the first day.  Add more substantial food to your diet according to your provider's instructions.  Babies can be fed formula or breast milk as soon as they are hungry.  INCREASE FLUIDS AND FIBER TO AVOID CONSTIPATION.    MILD FLU-LIKE SYMPTOMS ARE NORMAL.  YOU MAY EXPERIENCE GENERALIZED MUSCLE ACHES, THROAT IRRITATION, HEADACHE AND/OR SOME NAUSEA.        Peripheral Nerve Block Discharge Instructions from Same Day Surgery:    What to Expect - Upper Extremity  You may experience numbness and weakness in shoulder, arm, and hand  on the same side as your surgery  This is normal. For some people, this may be an unpleasant sensation. Be very careful with your numb limb  Ask for help when you need it  Shoulder Surgery Side Effects  In addition to numbness and weakness you may experience other symptoms  Other nerves that are close to those nerves injected can also be affected by local anesthesia  You may experience a  "hoarseness in your voice  Your breathing may feel different  You may also notice drooping of your eyelid, pupil constriction, and decreased sweating, on the side of your surgery  All of these side effects are normal and will resolve when the local anesthetic wears off   Prevent Injury  Protect the limb like a baby  Beware of exposing your limb to extreme heat or cold or trauma  The limb may be injured without you noticing because it is numb  Keep the limb elevated whenever possible  Do not sleep on the limb  Change the position of the limb regularly  Avoid putting pressure on your surgical limb  Pain Control  The initial block on the day of surgery will make your extremity feel \"numb\"  Any consecutive injection including prior to discharge from the hospital will make your extremity feel \"numb\"  You may feel an aching or burning when the local anesthesia starts to wear off  Take pain pills as prescribed by your surgeon  Call your surgeon or anesthesiologist if you do not have adequate pain control    "

## 2024-04-09 NOTE — ANESTHESIA POSTPROCEDURE EVALUATION
Patient: Moe Car    Procedure Summary       Date: 04/09/24 Room / Location:  OR  / SURGERY [facility]    Anesthesia Start: 1057 Anesthesia Stop: 1308    Procedure: Right shoulder arthroscope rotator cuff repair, biceps tenodesis, subacromial decompression, distal clavicle resection (Right: Shoulder) Diagnosis:       Rotator cuff tear      Subacromial bursitis      Nontraumatic type 1 superior labral anterior-to-posterior (SLAP) tear of right shoulder      Nontraumatic rupture of right proximal biceps tendon      Acromioclavicular arthrosis      (Rotator cuff tear [M75.100]Subacromial bursitis [M75.50]Nontraumatic type 1 superior labral anterior-to-posterior (SLAP) tear of right shoulder [S43.431A]Nontraumatic rupture of right proximal biceps tendon [M66.321]Acromioclavicular arthrosis [M19.019])    Surgeons: Logan Amado M.D. Responsible Provider: Jairo Parra D.O.    Anesthesia Type: general, peripheral nerve block ASA Status: 3            Final Anesthesia Type: general, peripheral nerve block  Last vitals  BP   Blood Pressure: 102/62    Temp   36.8 °C (98.2 °F)    Pulse   60   Resp   18    SpO2   92 %      Anesthesia Post Evaluation    Patient location during evaluation: PACU  Patient participation: complete - patient participated  Level of consciousness: awake and alert    Airway patency: patent  Anesthetic complications: no  Cardiovascular status: hemodynamically stable  Respiratory status: acceptable  Hydration status: euvolemic    PONV: none    patient able to participate, but full recovery from regional anesthesia has not occurred and is not expected within the stipulated timeframe for the completion of the evaluation      There were no known notable events for this encounter.     Nurse Pain Score: 0 (NPRS)

## 2024-04-09 NOTE — ANESTHESIA PREPROCEDURE EVALUATION
Case: [id number] Date/Time: 04/09/24 1045    Procedure: Right shoulder arthroscope rotator cuff repair, biceps tenodesis, subacromial decompression, distal clavicle resection and repairs as indicated    Diagnosis:       Rotator cuff tear [M75.100]      Subacromial bursitis [M75.50]      Nontraumatic type 1 superior labral anterior-to-posterior (SLAP) tear of right shoulder [S43.431A]      Nontraumatic rupture of right proximal biceps tendon [M66.321]      Acromioclavicular arthrosis [M19.019]    Pre-op diagnosis: Rotator cuff tear [M75.100]Subacromial bursitis [M75.50]Nontraumatic type 1 superior labral anterior-to-posterior (SLAP) tear of right shoulder [S43.431A]Nontraumatic rupture of right proximal biceps tendon [M66.321]Acromioclavicular arthrosis [M19.019]    Location: [place] [address] / SURGERY [facility]    Surgeons: BLANCO Andrea H&P:  PAST MEDICAL HISTORY:   58 y.o. male who presents for Procedure(s):  Right shoulder arthroscope rotator cuff repair, biceps tenodesis, subacromial decompression, distal clavicle resection and repairs as indicated.  He has current and past medical problems significant for:    Past Medical History:   Diagnosis Date    Diabetes (HCC) 51148778?    Jardiance and Metformin A1c 5.2    High cholesterol 13274555    Pravastatine and diet    Hyperlipidemia 10/21/2009    Hypertension 10/21/2009    Obstructive sleep apnea 12/02/2009    Pain [id number]    Torn Rotator Cuf       SMOKING/ALCOHOL/RECREATIONAL DRUG USE:  Social History     Tobacco Use    Smoking status: Never    Smokeless tobacco: Current     Types: Chew    Tobacco comments:     Smokeless and nicotine gum   Vaping Use    Vaping Use: Never used   Substance Use Topics    Alcohol use: Yes     Alcohol/week: 1.5 oz     Types: 3 Cans of beer per week     Comment: day    Drug use: No     Social History     Substance and Sexual Activity   Drug Use No       PAST SURGICAL HISTORY:  Past Surgical History:   Procedure Laterality  Date    OTHER ABDOMINAL SURGERY  [id number]    Gall bladder removed    VASECTOMY         ALLERGIES:   Allergies   Allergen Reactions    Crestor [Rosuvastatin Calcium]      Heart palpitations       MEDICATIONS:  No current facility-administered medications on file prior to encounter.     Current Outpatient Medications on File Prior to Encounter   Medication Sig Dispense Refill    allopurinol (ZYLOPRIM) 300 MG Tab TAKE 1 TABLET DAILY 90 Tablet 3    hydrochlorothiazide (MICROZIDE) 12.5 MG capsule TAKE 1 CAPSULE DAILY 90 Capsule 3    metoprolol (TOPROL-XL) 200 MG XL tablet Take 1 Tablet by mouth every day. TAKE 1 TABLET DAILY FOR HIGH BLOOD PRESSURE 90 Tablet 3    pravastatin (PRAVACHOL) 80 MG tablet TAKE 1 TABLET DAILY (Patient taking differently: every day. TAKE 1 TABLET DAILY) 90 Tablet 3    Telmisartan-amLODIPine 40-10 MG Tab Take 1 Tablet by mouth every day. 90 Tablet 3    metFORMIN ER (GLUCOPHAGE XR) 750 MG TABLET SR 24 HR Take 1 Tablet by mouth every day. 90 Tablet 3    Empagliflozin (JARDIANCE) 25 MG Tab Take 1 Tablet by mouth every day. 90 Tablet 4    Multiple Vitamins-Minerals (MULTIVITAMIN ADULT PO) Take  by mouth.      Cholecalciferol (VITAMIN D-3) 5000 UNITS TABS Take  by mouth.      Coenzyme Q10 (CO Q 10 PO) Take  by mouth.         LABS:  Lab Results   Component Value Date/Time    HEMOGLOBIN 7.2 (L) 02/08/2024 1409    HEMATOCRIT 23.7 (L) 02/08/2024 1409    WBC 8.9 02/08/2024 1409     Lab Results   Component Value Date/Time    SODIUM 135 02/08/2024 1409    POTASSIUM 4.1 02/08/2024 1409    CHLORIDE 100 02/08/2024 1409    CO2 21 02/08/2024 1409    GLUCOSE 108 (H) 02/08/2024 1409    BUN 13 02/08/2024 1409    CALCIUM 9.2 02/08/2024 1409         PREVIOUS ANESTHETICS: See EMR  __________________________________________    Relevant Problems   ANESTHESIA   (positive) Obstructive sleep apnea      CARDIAC   (positive) Hypertension      ENDO   (positive) Type 2 diabetes mellitus without complication (HCC)      Other    (positive) Body mass index (BMI) of 45.0-49.9 in adult (HCC)       Physical Exam    Airway   Mallampati: II  TM distance: >3 FB  Neck ROM: full       Cardiovascular - normal exam  Rhythm: regular  Rate: normal  (-) murmur     Dental - normal exam           Pulmonary - normal exam  Breath sounds clear to auscultation     Abdominal   (+) obese     Neurological - normal exam                   Anesthesia Plan    ASA 3   ASA physical status 3 criteria: morbid obesity - BMI greater than or equal to 40    Plan - general and peripheral nerve block     Peripheral nerve block will be post-op pain control  Airway plan will be ETT          Induction: intravenous    Postoperative Plan: Postoperative administration of opioids is intended.    Pertinent diagnostic labs and testing reviewed    Informed Consent:    Anesthetic plan and risks discussed with patient.    Use of blood products discussed with: patient whom consented to blood products.

## 2024-04-09 NOTE — ANESTHESIA TIME REPORT
Anesthesia Start and Stop Event Times       Date Time Event    4/9/2024 1054 Ready for Procedure     1057 Anesthesia Start     1308 Anesthesia Stop          Responsible Staff  04/09/24      Name Role Begin End    Jairo Parra D.O. Anesth 1057 1308          Overtime Reason:  no overtime (within assigned shift)    Comments:

## 2024-04-09 NOTE — OR NURSING
1303:  Arrived to PACU. Report received from anesthesia/OR circulator. Patient care assumed.   Sleeping, respirations spontaneous and non-labored via OAW.    Dressings: Surgical site CDI. No redness, warmth or swelling noted. Ice pack placed over dressing and arm supported with pillow.  Plan to keep in PACU for full hour per STOP bang policy.    1304:  Pt sating 85% on 10 L.  Changed mask to non re breather and repositioned pt.  O2 sats to 92.  Anesthesia aware and at bedside.    1324:  Rouses spontaneously.  OPA Dc'd.  Pt back to sleep.    1420:  Pt up to recliner.  RA sat 83%  Given IS with goal of 3200.  Pt currently achieving 2200.  O2 2L NC.    1430:  Weaned to RA    1446:  Meets criteria for stage 2.  Waiting to give report.    1452:  Report given.            1455:  Patient met criteria for transfer to stage II via gurney with CNA assist. Patient states good pain control. Denies n/v, tolerating po well. Surgical dressing CDI. Ice pack sent with pt. Report called to: Emily GONZALEZ.

## 2024-04-09 NOTE — LETTER
February 28, 2024    Patient Name: Moe Car  Surgeon Name: Logan Amado M.D.  Surgery Facility: [facility] ([address] [place])  Surgery Date: 4/9/2024    The time of your surgery is not final and may change up to and until the day of your surgery. You will be contacted 24-48 hours prior to your surgery date with your check-in and surgery time.    If you will not be at one of the below numbers please call the surgery scheduler at [phone]  Preferred Phone: [phone]    BEFORE YOUR SURGERY   Pre Registration and/or Lab Work must be done within and no earlier than 28 days prior to your surgery date. Your scheduled facility will contact you regarding all required preregistration and/or lab work. If you have not been contacted within 7 days of your scheduled procedure please call [facility] at [phone] for an appointment as soon as possible.    DAY OF YOUR SURGERY  Nothing to eat or drink after midnight     Refrain from smoking any substance after midnight prior to surgery. Smoking may interfere with the anesthetic and frequently produces nausea during the recovery period.    Continue taking all lifesaving medications. Including the morning of your surgery with small sip of water.    Please do NOT take on the day of surgery:  Diuretics: examples- furosemide (Lasix), spironolactone, hydrochlorothiazide  ACE-inhibitors: examples- lisinopril, ramipril, enalapril  “ARBs”: examples- losartan, Olmesartan, valsartan    Please arrive at the hospital/surgery center at the check-in time provided.     An adult will need to bring you and take you home after your surgery.     AFTER YOUR SURGERY  Post op Appointment:   Date: 4/22/24   Time: 11 AM   With: Logan Amado MD   Location: [address] [place], [place] [address] [address]    - No dental work for 3-6 months after your surgery.  - You must have someone provide transportation post surgery and someone to  monitor you for at least 24 hours post-surgery. If you don't have either of these your appointment will be canceled.     TIME OFF WORK  FMLA or Disability forms can be faxed directly to: [phone] or you may drop them off at [address] [address]. Our office charges a $35.00 fee per form. Forms will be completed within 10 business days of drop off and payment received. For the status of your forms you may contact our disability office directly [phone].

## 2024-04-09 NOTE — OP REPORT
SURGEON:  Logan Amado M.D.    PREOPERATIVE DIAGNOSIS:    Right shoulder rotator cuff tear, proximal biceps pathology, subacromial impingement, AC joint arthrosis and labral pathology.     POSTOPERATIVE DIAGNOSIS:    Right shoulder full-thickness rotator cuff tear  Right shoulder proximal biceps pathology  Right shoulder subacromial impingement  Right shoulder acromioclavicular joint arthrosis  Right shoulder type I superior labral anterior to posterior tear  Right shoulder synovitis    PROCEDURES PERFORMED:      Right shoulder diagnostic arthroscopy with arthroscopic rotator cuff repair  Right shoulder arthroscopic biceps tenodesis  Right shoulder arthroscopic subacromial decompression  Right shoulder arthroscopic distal clavicle resection  Right shoulder extensive glenohumeral joint debridement    ASSISTANT:  Renée Bailon PA-C    ANESTHESIA:   General and an interscalene nerve block    ANESTHESIOLOGIST:   Dr. Nancy MD    IMPLANTS:   Three 4.75 mm Burdett biocomposite Alpha Vent suture anchors, two 1.4 mm Iconix suture anchors    COMPLICATIONS:  None.    DISPOSITION:  Stable to Post Anesthesia Care Unit.    INDICATIONS:  The patient has had progressive shoulder pain that has been unresponsive to conservative management. The risks, benefits, alternative and limitations of surgical intervention were discussed in detail. They have expressed understanding and desire to proceed with surgical intervention.    PROCEDURE IN DETAIL:  The patient and the correct operative extremity were identified in the preoperative area.  The extremity was marked.  The patient was brought to the operating room where the correct operative extremity was again confirmed.  They were placed supine on the OR table after undergoing an interscalene nerve block performed at my request for post-operative pain management. General anesthesia was then induced without complication.  Examination under anesthesia showed full range of motion.  The  extremity was prepped with alcohol and the subacromial space injected with 1% lidocaine with epinephrine.  The extremity was then prepped and draped in the usual sterile fashion using ChloraPrep.    Diagnostic arthroscopy was then performed which showed intact articular cartilage of the humeral head, and grade II chondromalacia of the articular cartilage of the glenoid. The labrum showed type I superior labral anterior posterior tear and circumferential fraying of the labrum. The biceps showed extensive splitting and fraying of the intra-articular portion. The subscapularis showed some splitting of the superior fibers which was debrided. The supraspinatus was torn and retracted. The Infraspinatus was torn retracted and delaminated. The teres minor was intact. There was moderate synovitis in the joint.    The labrum was debrided circumferentially. Synovectomy was performed and the biceps was tenotomized. The scope was then withdrawn and replaced in the subacromial space.  There was a large amount of inflamed irritated bursa within the subacromial space.  There was extensive splitting and fraying on the undersurface of the CA ligament.    The undersurface of the acromion was exposed revealing a moderate subacromial spur.  An anterior acromioplasty was performed taking the acromion down to a smooth flat type I.  There was extensive arthrosis of the AC joint.  A circumferential resection of 8 to 10 mm of the distal clavicle was then performed.  The bony debris was removed from the subacromial space.      The biceps was then tenodesed within the bicipital groove with two 1.4 mm iconix anchors, a cinch stitch proximally and a cerclage stitch distally.  The excess biceps was excised.      The full-thickness, retracted, delaminated rotator cuff tear was then identified and debrided.  The greater tuberosity was debrided down to bleeding bone.  Bone marrow vents were placed medially.  3 triple loaded anchors were placed  centrally in the greater tuberosity.  9 simple mattress stitches were passed through the torn rotator cuff and tied down securely with sliding locking SMC knots with alternating stacked half hitches.  This resulted in a secure repair of the rotator cuff.    A spinal needle was then placed into the subacromial space under direct vision. The portals were closed with 3-0 Nylon. The extremity was injected with 0.5% bupivacaine with epinephrine.  Sterile dressings were applied. The patient was placed into an UltraSling. The patient was then allowed to awake from anesthesia, transferred to their hospital cart, and taken to the Post Anesthesia Care Unit in stable condition.  The patient tolerated the procedure well.  There were no immediate complications.    An abduction shoulder sling was provided following the above surgery to keep the patient's arm well supported during the healing phase. A delay in providing this brace would place the patient at risk of reinjury.

## 2024-04-09 NOTE — DISCHARGE INSTR - OTHER INFO
Keep dressings on for 2 days.  After two days, you may shower with wounds uncovered using normal soap and water.  Apply band aids to wounds after shower. DO NOT SUBMERGE INCSIONS for 3 weeks.  Keep sling on at all times except bathing. Non-weightbearing with the operative arm.  If sitting, it is OK to remove the sling temporarily for gentle motion at the elbow then replace sling.  It is OK to use the wrist and hand for simple activities while in the sling but do not attempt to lift the arm, lift anything with the arm, or twist hard with the hand (opening jars, screwdrivers, etc.)

## 2024-04-09 NOTE — H&P
Surgery Orthopedic History & Physical Note    Date  4/9/2024    Primary Care Physician  Dakota Parikh M.D.    CC  Pre-Op Diagnosis Codes:     * Rotator cuff tear [M75.100]     * Subacromial bursitis [M75.50]     * Nontraumatic type 1 superior labral anterior-to-posterior (SLAP) tear of right shoulder [S43.431A]     * Nontraumatic rupture of right proximal biceps tendon [M66.321]     * Acromioclavicular arthrosis [M19.019]    HPI  This is a 58 y.o. male who presented with right shoulder pain.    Past Medical History:   Diagnosis Date    Diabetes ([facility]) [id number]?    Jardiance and Metformin A1c 5.2    High cholesterol [id number]    Pravastatine and diet    Hyperlipidemia 10/21/2009    Hypertension 10/21/2009    Obstructive sleep apnea 12/02/2009    Pain [id number]    Torn Rotator Cuf       Past Surgical History:   Procedure Laterality Date    OTHER ABDOMINAL SURGERY  [id number]    Gall bladder removed    VASECTOMY         No current facility-administered medications for this encounter.       Social History     Socioeconomic History    Marital status:      Spouse name: Not on file    Number of children: Not on file    Years of education: Not on file    Highest education level: 12th grade   Occupational History    Not on file   Tobacco Use    Smoking status: Never    Smokeless tobacco: Current     Types: Chew    Tobacco comments:     Smokeless and nicotine gum   Vaping Use    Vaping Use: Never used   Substance and Sexual Activity    Alcohol use: Yes     Alcohol/week: 1.5 oz     Types: 3 Cans of beer per week     Comment: day    Drug use: No    Sexual activity: Yes     Partners: Female     Birth control/protection: Surgical   Other Topics Concern    Not on file   Social History Narrative    Not on file     Social Determinants of Health     Financial Resource Strain: Low Risk  (3/28/2024)    Overall Financial Resource Strain (CARDIA)     Difficulty of Paying Living Expenses: Not hard at all   Food Insecurity: No Food  Insecurity (3/28/2024)    Hunger Vital Sign     Worried About Running Out of Food in the Last Year: Never true     Ran Out of Food in the Last Year: Never true   Transportation Needs: No Transportation Needs (3/28/2024)    PRAPARE - Transportation     Lack of Transportation (Medical): No     Lack of Transportation (Non-Medical): No   Physical Activity: Insufficiently Active (3/28/2024)    Exercise Vital Sign     Days of Exercise per Week: 1 day     Minutes of Exercise per Session: 10 min   Stress: No Stress Concern Present (3/28/2024)    [employer] [place] [employer] - Occupational Stress Questionnaire     Feeling of Stress : Not at all   Social Connections: Moderately Isolated (3/28/2024)    Social Connection and Isolation Panel [NHANES]     Frequency of Communication with Friends and Family: More than three times a week     Frequency of Social Gatherings with Friends and Family: Twice a week     Attends Jainism Services: Never     Active Member of Clubs or Organizations: No     Attends Club or Organization Meetings: Never     Marital Status:    Intimate Partner Violence: Not on file   Housing Stability: Unknown (3/28/2024)    Housing Stability Vital Sign     Unable to Pay for Housing in the Last Year: No     Number of Places Lived in the Last Year: Not on file     Unstable Housing in the Last Year: No       Family History   Problem Relation Age of Onset    Hypertension Mother     Heart Disease Mother         cad  at 60    Hypertension Father     Heart Disease Paternal Grandfather        Allergies  Crestor [rosuvastatin calcium]    Review of Systems  Negative    Physical Exam  Vitals and nursing note reviewed.   HENT:      Head: Normocephalic.      Nose: Nose normal.      Mouth/Throat:      Mouth: Mucous membranes are normal.  Eyes:      Extraocular Movements: Extraocular movements intact.   Cardiovascular:      Rate and Rhythm: Normal rate and regular rhythm.   Pulmonary:      Effort:  Pulmonary effort is normal.   Abdominal:      General: Abdomen is flat.   Musculoskeletal:         General: Tenderness present.      Cervical back: Normal range of motion.   Skin:     General: Skin is warm and dry.   Neurological:      General: No focal deficit present.      Mental Status: The are alert.   Psychiatric:         Mood and Affect: Mood normal.       Vital Signs                          Labs:                    Radiology:  No orders to display         Assessment/Plan:  Pre-Op Diagnosis Codes:     * Rotator cuff tear [M75.100]     * Subacromial bursitis [M75.50]     * Nontraumatic type 1 superior labral anterior-to-posterior (SLAP) tear of right shoulder [S43.431A]     * Nontraumatic rupture of right proximal biceps tendon [M66.321]     * Acromioclavicular arthrosis [M19.019]  Procedure(s):  Right shoulder arthroscope rotator cuff repair, biceps tenodesis, subacromial decompression, distal clavicle resection and repairs as indicated

## 2024-04-09 NOTE — OR NURSING
Patient allergies and NPO status verified, home medication reconciliation completed, belongings secured. Patient verbalizes understanding of pain scale, expected course of stay and plan of care. Surgical site verified with patient, IV access established sequentials and CHARLEE hose placed on BLE.

## 2024-05-03 ENCOUNTER — HOSPITAL ENCOUNTER (OUTPATIENT)
Dept: LAB | Facility: MEDICAL CENTER | Age: 59
End: 2024-05-03
Attending: STUDENT IN AN ORGANIZED HEALTH CARE EDUCATION/TRAINING PROGRAM
Payer: COMMERCIAL

## 2024-05-03 LAB
ALBUMIN SERPL BCP-MCNC: 4.2 G/DL (ref 3.2–4.9)
ALBUMIN/GLOB SERPL: 1.2 G/DL
ALP SERPL-CCNC: 108 U/L (ref 30–99)
ALT SERPL-CCNC: 11 U/L (ref 2–50)
ANION GAP SERPL CALC-SCNC: 13 MMOL/L (ref 7–16)
AST SERPL-CCNC: 17 U/L (ref 12–45)
BILIRUB SERPL-MCNC: 0.3 MG/DL (ref 0.1–1.5)
BUN SERPL-MCNC: 15 MG/DL (ref 8–22)
CALCIUM ALBUM COR SERPL-MCNC: 9.2 MG/DL (ref 8.5–10.5)
CALCIUM SERPL-MCNC: 9.4 MG/DL (ref 8.5–10.5)
CHLORIDE SERPL-SCNC: 104 MMOL/L (ref 96–112)
CHOLEST SERPL-MCNC: 146 MG/DL (ref 100–199)
CO2 SERPL-SCNC: 24 MMOL/L (ref 20–33)
CREAT SERPL-MCNC: 0.62 MG/DL (ref 0.5–1.4)
ERYTHROCYTE [DISTWIDTH] IN BLOOD BY AUTOMATED COUNT: 52.9 FL (ref 35.9–50)
FASTING STATUS PATIENT QL REPORTED: NORMAL
GFR SERPLBLD CREATININE-BSD FMLA CKD-EPI: 110 ML/MIN/1.73 M 2
GLOBULIN SER CALC-MCNC: 3.4 G/DL (ref 1.9–3.5)
GLUCOSE SERPL-MCNC: 149 MG/DL (ref 65–99)
HCT VFR BLD AUTO: 36.2 % (ref 42–52)
HDLC SERPL-MCNC: 62 MG/DL
HGB BLD-MCNC: 10.4 G/DL (ref 14–18)
LDLC SERPL CALC-MCNC: 67 MG/DL
MCH RBC QN AUTO: 21.9 PG (ref 27–33)
MCHC RBC AUTO-ENTMCNC: 28.7 G/DL (ref 32.3–36.5)
MCV RBC AUTO: 76.4 FL (ref 81.4–97.8)
PLATELET # BLD AUTO: 397 K/UL (ref 164–446)
PMV BLD AUTO: 10.4 FL (ref 9–12.9)
POTASSIUM SERPL-SCNC: 4 MMOL/L (ref 3.6–5.5)
PROT SERPL-MCNC: 7.6 G/DL (ref 6–8.2)
PSA SERPL-MCNC: 1.1 NG/ML (ref 0–4)
RBC # BLD AUTO: 4.74 M/UL (ref 4.7–6.1)
SODIUM SERPL-SCNC: 141 MMOL/L (ref 135–145)
T3 SERPL-MCNC: 124 NG/DL (ref 60–181)
T4 SERPL-MCNC: 6.9 UG/DL (ref 4–12)
TRIGL SERPL-MCNC: 87 MG/DL (ref 0–149)
TSH SERPL DL<=0.005 MIU/L-ACNC: 2.98 UIU/ML (ref 0.38–5.33)
WBC # BLD AUTO: 8.5 K/UL (ref 4.8–10.8)

## 2024-08-14 ENCOUNTER — PRE-ADMISSION TESTING (OUTPATIENT)
Dept: ADMISSIONS | Facility: MEDICAL CENTER | Age: 59
End: 2024-08-14
Attending: SURGERY
Payer: COMMERCIAL

## 2024-08-14 ENCOUNTER — PRE-ADMISSION TESTING (OUTPATIENT)
Dept: ADMISSIONS | Facility: MEDICAL CENTER | Age: 59
DRG: 329 | End: 2024-08-14
Attending: SURGERY
Payer: COMMERCIAL

## 2024-08-14 DIAGNOSIS — Z01.810 PRE-OPERATIVE CARDIOVASCULAR EXAMINATION: ICD-10-CM

## 2024-08-14 DIAGNOSIS — Z01.812 PRE-OPERATIVE LABORATORY EXAMINATION: ICD-10-CM

## 2024-08-14 LAB
ALBUMIN SERPL BCP-MCNC: 4.4 G/DL (ref 3.2–4.9)
ALBUMIN/GLOB SERPL: 1.4 G/DL
ALP SERPL-CCNC: 121 U/L (ref 30–99)
ALT SERPL-CCNC: 24 U/L (ref 2–50)
ANION GAP SERPL CALC-SCNC: 12 MMOL/L (ref 7–16)
APTT PPP: 29 SEC (ref 24.7–36)
AST SERPL-CCNC: 21 U/L (ref 12–45)
BASOPHILS # BLD AUTO: 0.5 % (ref 0–1.8)
BASOPHILS # BLD: 0.04 K/UL (ref 0–0.12)
BILIRUB SERPL-MCNC: 0.4 MG/DL (ref 0.1–1.5)
BUN SERPL-MCNC: 18 MG/DL (ref 8–22)
CALCIUM ALBUM COR SERPL-MCNC: 9.3 MG/DL (ref 8.5–10.5)
CALCIUM SERPL-MCNC: 9.6 MG/DL (ref 8.5–10.5)
CHLORIDE SERPL-SCNC: 102 MMOL/L (ref 96–112)
CO2 SERPL-SCNC: 24 MMOL/L (ref 20–33)
CREAT SERPL-MCNC: 0.84 MG/DL (ref 0.5–1.4)
EKG IMPRESSION: NORMAL
EOSINOPHIL # BLD AUTO: 0.14 K/UL (ref 0–0.51)
EOSINOPHIL NFR BLD: 1.7 % (ref 0–6.9)
ERYTHROCYTE [DISTWIDTH] IN BLOOD BY AUTOMATED COUNT: 52.9 FL (ref 35.9–50)
EST. AVERAGE GLUCOSE BLD GHB EST-MCNC: 146 MG/DL
GFR SERPLBLD CREATININE-BSD FMLA CKD-EPI: 100 ML/MIN/1.73 M 2
GLOBULIN SER CALC-MCNC: 3.2 G/DL (ref 1.9–3.5)
GLUCOSE SERPL-MCNC: 251 MG/DL (ref 65–99)
HBA1C MFR BLD: 6.7 % (ref 4–5.6)
HCT VFR BLD AUTO: 46.9 % (ref 42–52)
HGB BLD-MCNC: 14.7 G/DL (ref 14–18)
IMM GRANULOCYTES # BLD AUTO: 0.02 K/UL (ref 0–0.11)
IMM GRANULOCYTES NFR BLD AUTO: 0.2 % (ref 0–0.9)
INR PPP: 1 (ref 0.87–1.13)
LYMPHOCYTES # BLD AUTO: 2.52 K/UL (ref 1–4.8)
LYMPHOCYTES NFR BLD: 31.2 % (ref 22–41)
MCH RBC QN AUTO: 25.1 PG (ref 27–33)
MCHC RBC AUTO-ENTMCNC: 31.3 G/DL (ref 32.3–36.5)
MCV RBC AUTO: 80 FL (ref 81.4–97.8)
MONOCYTES # BLD AUTO: 0.73 K/UL (ref 0–0.85)
MONOCYTES NFR BLD AUTO: 9 % (ref 0–13.4)
NEUTROPHILS # BLD AUTO: 4.62 K/UL (ref 1.82–7.42)
NEUTROPHILS NFR BLD: 57.4 % (ref 44–72)
NRBC # BLD AUTO: 0 K/UL
NRBC BLD-RTO: 0 /100 WBC (ref 0–0.2)
PLATELET # BLD AUTO: 347 K/UL (ref 164–446)
PMV BLD AUTO: 9.4 FL (ref 9–12.9)
POTASSIUM SERPL-SCNC: 3.9 MMOL/L (ref 3.6–5.5)
PROT SERPL-MCNC: 7.6 G/DL (ref 6–8.2)
PROTHROMBIN TIME: 13.3 SEC (ref 12–14.6)
RBC # BLD AUTO: 5.86 M/UL (ref 4.7–6.1)
SODIUM SERPL-SCNC: 138 MMOL/L (ref 135–145)
WBC # BLD AUTO: 8.1 K/UL (ref 4.8–10.8)

## 2024-08-14 PROCEDURE — 85025 COMPLETE CBC W/AUTO DIFF WBC: CPT

## 2024-08-14 PROCEDURE — 36415 COLL VENOUS BLD VENIPUNCTURE: CPT

## 2024-08-14 PROCEDURE — 80053 COMPREHEN METABOLIC PANEL: CPT

## 2024-08-14 PROCEDURE — 85610 PROTHROMBIN TIME: CPT

## 2024-08-14 PROCEDURE — 93010 ELECTROCARDIOGRAM REPORT: CPT | Performed by: INTERNAL MEDICINE

## 2024-08-14 PROCEDURE — 93005 ELECTROCARDIOGRAM TRACING: CPT

## 2024-08-14 PROCEDURE — 85730 THROMBOPLASTIN TIME PARTIAL: CPT

## 2024-08-14 PROCEDURE — 83036 HEMOGLOBIN GLYCOSYLATED A1C: CPT

## 2024-08-14 RX ORDER — ASPIRIN 81 MG/1
81 TABLET ORAL DAILY
Status: ON HOLD | COMMUNITY
End: 2024-09-01

## 2024-08-14 RX ORDER — TIRZEPATIDE 2.5 MG/.5ML
2.5 INJECTION, SOLUTION SUBCUTANEOUS
Status: ON HOLD | COMMUNITY
End: 2024-09-01

## 2024-08-27 ENCOUNTER — ANESTHESIA EVENT (OUTPATIENT)
Dept: SURGERY | Facility: MEDICAL CENTER | Age: 59
End: 2024-08-27
Payer: COMMERCIAL

## 2024-08-28 ENCOUNTER — ANESTHESIA (OUTPATIENT)
Dept: SURGERY | Facility: MEDICAL CENTER | Age: 59
End: 2024-08-28
Payer: COMMERCIAL

## 2024-08-28 ENCOUNTER — HOSPITAL ENCOUNTER (INPATIENT)
Facility: MEDICAL CENTER | Age: 59
End: 2024-08-28
Attending: SURGERY | Admitting: SURGERY
Payer: COMMERCIAL

## 2024-08-28 DIAGNOSIS — K63.5 POLYP OF DESCENDING COLON, UNSPECIFIED TYPE: ICD-10-CM

## 2024-08-28 DIAGNOSIS — Z78.9 NO CONTRAINDICATION TO DEEP VEIN THROMBOSIS (DVT) PROPHYLAXIS: ICD-10-CM

## 2024-08-28 LAB — PATHOLOGY CONSULT NOTE: NORMAL

## 2024-08-28 PROCEDURE — 160035 HCHG PACU - 1ST 60 MINS PHASE I: Performed by: SURGERY

## 2024-08-28 PROCEDURE — 88307 TISSUE EXAM BY PATHOLOGIST: CPT

## 2024-08-28 PROCEDURE — 700111 HCHG RX REV CODE 636 W/ 250 OVERRIDE (IP): Mod: JZ | Performed by: ANESTHESIOLOGY

## 2024-08-28 PROCEDURE — 110371 HCHG SHELL REV 272: Performed by: SURGERY

## 2024-08-28 PROCEDURE — 502714 HCHG ROBOTIC SURGERY SERVICES: Performed by: SURGERY

## 2024-08-28 PROCEDURE — A9270 NON-COVERED ITEM OR SERVICE: HCPCS

## 2024-08-28 PROCEDURE — 64488 TAP BLOCK BI INJECTION: CPT | Performed by: SURGERY

## 2024-08-28 PROCEDURE — A9270 NON-COVERED ITEM OR SERVICE: HCPCS | Performed by: ANESTHESIOLOGY

## 2024-08-28 PROCEDURE — 82962 GLUCOSE BLOOD TEST: CPT

## 2024-08-28 PROCEDURE — 700111 HCHG RX REV CODE 636 W/ 250 OVERRIDE (IP): Mod: JZ

## 2024-08-28 PROCEDURE — 160009 HCHG ANES TIME/MIN: Performed by: SURGERY

## 2024-08-28 PROCEDURE — 700105 HCHG RX REV CODE 258

## 2024-08-28 PROCEDURE — 700101 HCHG RX REV CODE 250: Performed by: ANESTHESIOLOGY

## 2024-08-28 PROCEDURE — 700105 HCHG RX REV CODE 258: Performed by: ANESTHESIOLOGY

## 2024-08-28 PROCEDURE — 8E0W4CZ ROBOTIC ASSISTED PROCEDURE OF TRUNK REGION, PERCUTANEOUS ENDOSCOPIC APPROACH: ICD-10-PCS | Performed by: SURGERY

## 2024-08-28 PROCEDURE — 700102 HCHG RX REV CODE 250 W/ 637 OVERRIDE(OP): Performed by: ANESTHESIOLOGY

## 2024-08-28 PROCEDURE — 700102 HCHG RX REV CODE 250 W/ 637 OVERRIDE(OP)

## 2024-08-28 PROCEDURE — 88309 TISSUE EXAM BY PATHOLOGIST: CPT

## 2024-08-28 PROCEDURE — 770001 HCHG ROOM/CARE - MED/SURG/GYN PRIV*

## 2024-08-28 PROCEDURE — 160048 HCHG OR STATISTICAL LEVEL 1-5: Performed by: SURGERY

## 2024-08-28 PROCEDURE — 160031 HCHG SURGERY MINUTES - 1ST 30 MINS LEVEL 5: Performed by: SURGERY

## 2024-08-28 PROCEDURE — 700111 HCHG RX REV CODE 636 W/ 250 OVERRIDE (IP): Performed by: ANESTHESIOLOGY

## 2024-08-28 PROCEDURE — 0DBU0ZZ EXCISION OF OMENTUM, OPEN APPROACH: ICD-10-PCS | Performed by: SURGERY

## 2024-08-28 PROCEDURE — 160042 HCHG SURGERY MINUTES - EA ADDL 1 MIN LEVEL 5: Performed by: SURGERY

## 2024-08-28 PROCEDURE — 0DTG0ZZ RESECTION OF LEFT LARGE INTESTINE, OPEN APPROACH: ICD-10-PCS | Performed by: SURGERY

## 2024-08-28 PROCEDURE — 160002 HCHG RECOVERY MINUTES (STAT): Performed by: SURGERY

## 2024-08-28 RX ORDER — ROCURONIUM BROMIDE 10 MG/ML
INJECTION, SOLUTION INTRAVENOUS PRN
Status: DISCONTINUED | OUTPATIENT
Start: 2024-08-28 | End: 2024-08-28 | Stop reason: SURG

## 2024-08-28 RX ORDER — ONDANSETRON 2 MG/ML
4 INJECTION INTRAMUSCULAR; INTRAVENOUS EVERY 4 HOURS PRN
Status: DISCONTINUED | OUTPATIENT
Start: 2024-08-28 | End: 2024-09-01 | Stop reason: HOSPADM

## 2024-08-28 RX ORDER — TRAZODONE HYDROCHLORIDE 50 MG/1
50 TABLET, FILM COATED ORAL NIGHTLY PRN
Status: DISCONTINUED | OUTPATIENT
Start: 2024-08-28 | End: 2024-09-01 | Stop reason: HOSPADM

## 2024-08-28 RX ORDER — METOPROLOL SUCCINATE 100 MG/1
200 TABLET, EXTENDED RELEASE ORAL DAILY
Status: DISCONTINUED | OUTPATIENT
Start: 2024-08-29 | End: 2024-09-01 | Stop reason: HOSPADM

## 2024-08-28 RX ORDER — ACETAMINOPHEN 500 MG
1000 TABLET ORAL EVERY 6 HOURS
Status: DISCONTINUED | OUTPATIENT
Start: 2024-08-28 | End: 2024-09-01 | Stop reason: HOSPADM

## 2024-08-28 RX ORDER — GLYCOPYRROLATE 0.2 MG/ML
INJECTION INTRAMUSCULAR; INTRAVENOUS PRN
Status: DISCONTINUED | OUTPATIENT
Start: 2024-08-28 | End: 2024-08-28 | Stop reason: SURG

## 2024-08-28 RX ORDER — OXYCODONE HCL 5 MG/5 ML
5 SOLUTION, ORAL ORAL
Status: COMPLETED | OUTPATIENT
Start: 2024-08-28 | End: 2024-08-28

## 2024-08-28 RX ORDER — HYDROMORPHONE HYDROCHLORIDE 1 MG/ML
0.2 INJECTION, SOLUTION INTRAMUSCULAR; INTRAVENOUS; SUBCUTANEOUS
Status: DISCONTINUED | OUTPATIENT
Start: 2024-08-28 | End: 2024-08-28 | Stop reason: HOSPADM

## 2024-08-28 RX ORDER — CALCIUM CARBONATE 500 MG/1
500 TABLET, CHEWABLE ORAL
Status: DISCONTINUED | OUTPATIENT
Start: 2024-08-28 | End: 2024-09-01 | Stop reason: HOSPADM

## 2024-08-28 RX ORDER — DIPHENHYDRAMINE HYDROCHLORIDE 50 MG/ML
25 INJECTION INTRAMUSCULAR; INTRAVENOUS EVERY 6 HOURS PRN
Status: DISCONTINUED | OUTPATIENT
Start: 2024-08-28 | End: 2024-09-01 | Stop reason: HOSPADM

## 2024-08-28 RX ORDER — ENOXAPARIN SODIUM 100 MG/ML
40 INJECTION SUBCUTANEOUS DAILY
Status: DISCONTINUED | OUTPATIENT
Start: 2024-08-29 | End: 2024-08-29

## 2024-08-28 RX ORDER — ONDANSETRON 2 MG/ML
4 INJECTION INTRAMUSCULAR; INTRAVENOUS
Status: COMPLETED | OUTPATIENT
Start: 2024-08-28 | End: 2024-08-28

## 2024-08-28 RX ORDER — CELECOXIB 200 MG/1
200 CAPSULE ORAL 2 TIMES DAILY
Status: DISCONTINUED | OUTPATIENT
Start: 2024-08-28 | End: 2024-09-01 | Stop reason: HOSPADM

## 2024-08-28 RX ORDER — DIPHENHYDRAMINE HYDROCHLORIDE 50 MG/ML
12.5 INJECTION INTRAMUSCULAR; INTRAVENOUS
Status: DISCONTINUED | OUTPATIENT
Start: 2024-08-28 | End: 2024-08-28 | Stop reason: HOSPADM

## 2024-08-28 RX ORDER — HYDROMORPHONE HYDROCHLORIDE 1 MG/ML
0.1 INJECTION, SOLUTION INTRAMUSCULAR; INTRAVENOUS; SUBCUTANEOUS
Status: DISCONTINUED | OUTPATIENT
Start: 2024-08-28 | End: 2024-08-28 | Stop reason: HOSPADM

## 2024-08-28 RX ORDER — ALLOPURINOL 300 MG/1
300 TABLET ORAL DAILY
Status: DISCONTINUED | OUTPATIENT
Start: 2024-08-29 | End: 2024-09-01 | Stop reason: HOSPADM

## 2024-08-28 RX ORDER — OXYCODONE HYDROCHLORIDE 5 MG/1
5 TABLET ORAL
Status: DISCONTINUED | OUTPATIENT
Start: 2024-08-28 | End: 2024-09-01 | Stop reason: HOSPADM

## 2024-08-28 RX ORDER — CELECOXIB 200 MG/1
200 CAPSULE ORAL 2 TIMES DAILY PRN
Status: DISCONTINUED | OUTPATIENT
Start: 2024-09-02 | End: 2024-09-01 | Stop reason: HOSPADM

## 2024-08-28 RX ORDER — TELMISARTAN AND AMLODIPINE 10; 40 MG/1; MG/1
1 TABLET ORAL DAILY
Status: DISCONTINUED | OUTPATIENT
Start: 2024-08-28 | End: 2024-08-28

## 2024-08-28 RX ORDER — ONDANSETRON 2 MG/ML
INJECTION INTRAMUSCULAR; INTRAVENOUS PRN
Status: DISCONTINUED | OUTPATIENT
Start: 2024-08-28 | End: 2024-08-28 | Stop reason: SURG

## 2024-08-28 RX ORDER — CEFOTETAN DISODIUM 2 G/20ML
INJECTION, POWDER, FOR SOLUTION INTRAMUSCULAR; INTRAVENOUS PRN
Status: DISCONTINUED | OUTPATIENT
Start: 2024-08-28 | End: 2024-08-28 | Stop reason: SURG

## 2024-08-28 RX ORDER — HYDROMORPHONE HYDROCHLORIDE 1 MG/ML
0.5 INJECTION, SOLUTION INTRAMUSCULAR; INTRAVENOUS; SUBCUTANEOUS
Status: DISCONTINUED | OUTPATIENT
Start: 2024-08-28 | End: 2024-09-01 | Stop reason: HOSPADM

## 2024-08-28 RX ORDER — ACETAMINOPHEN 500 MG
1000 TABLET ORAL EVERY 6 HOURS PRN
Status: DISCONTINUED | OUTPATIENT
Start: 2024-09-02 | End: 2024-09-01 | Stop reason: HOSPADM

## 2024-08-28 RX ORDER — DEXTROSE MONOHYDRATE 25 G/50ML
25 INJECTION, SOLUTION INTRAVENOUS
Status: DISCONTINUED | OUTPATIENT
Start: 2024-08-28 | End: 2024-09-01 | Stop reason: HOSPADM

## 2024-08-28 RX ORDER — TELMISARTAN 40 MG/1
40 TABLET ORAL
Status: DISCONTINUED | OUTPATIENT
Start: 2024-08-28 | End: 2024-09-01 | Stop reason: HOSPADM

## 2024-08-28 RX ORDER — SODIUM CHLORIDE, SODIUM LACTATE, POTASSIUM CHLORIDE, CALCIUM CHLORIDE 600; 310; 30; 20 MG/100ML; MG/100ML; MG/100ML; MG/100ML
INJECTION, SOLUTION INTRAVENOUS CONTINUOUS
Status: DISCONTINUED | OUTPATIENT
Start: 2024-08-28 | End: 2024-08-28 | Stop reason: HOSPADM

## 2024-08-28 RX ORDER — OXYCODONE HCL 5 MG/5 ML
10 SOLUTION, ORAL ORAL
Status: COMPLETED | OUTPATIENT
Start: 2024-08-28 | End: 2024-08-28

## 2024-08-28 RX ORDER — DEXAMETHASONE SODIUM PHOSPHATE 4 MG/ML
INJECTION, SOLUTION INTRA-ARTICULAR; INTRALESIONAL; INTRAMUSCULAR; INTRAVENOUS; SOFT TISSUE PRN
Status: DISCONTINUED | OUTPATIENT
Start: 2024-08-28 | End: 2024-08-28 | Stop reason: SURG

## 2024-08-28 RX ORDER — SCOLOPAMINE TRANSDERMAL SYSTEM 1 MG/1
1 PATCH, EXTENDED RELEASE TRANSDERMAL
Status: DISCONTINUED | OUTPATIENT
Start: 2024-08-28 | End: 2024-09-01 | Stop reason: HOSPADM

## 2024-08-28 RX ORDER — HYDROMORPHONE HYDROCHLORIDE 2 MG/ML
INJECTION, SOLUTION INTRAMUSCULAR; INTRAVENOUS; SUBCUTANEOUS PRN
Status: DISCONTINUED | OUTPATIENT
Start: 2024-08-28 | End: 2024-08-28 | Stop reason: SURG

## 2024-08-28 RX ORDER — OXYCODONE HYDROCHLORIDE 10 MG/1
10 TABLET ORAL
Status: DISCONTINUED | OUTPATIENT
Start: 2024-08-28 | End: 2024-09-01 | Stop reason: HOSPADM

## 2024-08-28 RX ORDER — SODIUM CHLORIDE, SODIUM LACTATE, POTASSIUM CHLORIDE, CALCIUM CHLORIDE 600; 310; 30; 20 MG/100ML; MG/100ML; MG/100ML; MG/100ML
INJECTION, SOLUTION INTRAVENOUS CONTINUOUS
Status: ACTIVE | OUTPATIENT
Start: 2024-08-28 | End: 2024-08-28

## 2024-08-28 RX ORDER — ROPIVACAINE HYDROCHLORIDE 5 MG/ML
INJECTION, SOLUTION EPIDURAL; INFILTRATION; PERINEURAL PRN
Status: DISCONTINUED | OUTPATIENT
Start: 2024-08-28 | End: 2024-08-28 | Stop reason: SURG

## 2024-08-28 RX ORDER — PRAVASTATIN SODIUM 20 MG
80 TABLET ORAL DAILY
Status: DISCONTINUED | OUTPATIENT
Start: 2024-08-28 | End: 2024-09-01 | Stop reason: HOSPADM

## 2024-08-28 RX ORDER — AMLODIPINE BESYLATE 10 MG/1
10 TABLET ORAL
Status: DISCONTINUED | OUTPATIENT
Start: 2024-08-28 | End: 2024-09-01 | Stop reason: HOSPADM

## 2024-08-28 RX ORDER — DIPHENHYDRAMINE HCL 25 MG
25 TABLET ORAL EVERY 6 HOURS PRN
Status: DISCONTINUED | OUTPATIENT
Start: 2024-08-28 | End: 2024-09-01 | Stop reason: HOSPADM

## 2024-08-28 RX ORDER — HYDROMORPHONE HYDROCHLORIDE 1 MG/ML
0.4 INJECTION, SOLUTION INTRAMUSCULAR; INTRAVENOUS; SUBCUTANEOUS
Status: DISCONTINUED | OUTPATIENT
Start: 2024-08-28 | End: 2024-08-28 | Stop reason: HOSPADM

## 2024-08-28 RX ADMIN — HYDROMORPHONE HYDROCHLORIDE 0.5 MG: 1 INJECTION, SOLUTION INTRAMUSCULAR; INTRAVENOUS; SUBCUTANEOUS at 17:03

## 2024-08-28 RX ADMIN — PIPERACILLIN AND TAZOBACTAM 4.5 G: 4; .5 INJECTION, POWDER, FOR SOLUTION INTRAVENOUS at 20:16

## 2024-08-28 RX ADMIN — CEFOTETAN DISODIUM 2 G: 2 INJECTION, POWDER, FOR SOLUTION INTRAMUSCULAR; INTRAVENOUS at 11:04

## 2024-08-28 RX ADMIN — AMLODIPINE BESYLATE 10 MG: 10 TABLET ORAL at 18:01

## 2024-08-28 RX ADMIN — ROCURONIUM BROMIDE 20 MG: 50 INJECTION, SOLUTION INTRAVENOUS at 11:54

## 2024-08-28 RX ADMIN — GLYCOPYRROLATE 0.2 MG: 0.2 INJECTION INTRAMUSCULAR; INTRAVENOUS at 11:05

## 2024-08-28 RX ADMIN — OXYCODONE HYDROCHLORIDE 10 MG: 10 TABLET ORAL at 18:11

## 2024-08-28 RX ADMIN — OXYCODONE HYDROCHLORIDE 10 MG: 10 TABLET ORAL at 21:12

## 2024-08-28 RX ADMIN — ROCURONIUM BROMIDE 70 MG: 50 INJECTION, SOLUTION INTRAVENOUS at 10:56

## 2024-08-28 RX ADMIN — ROCURONIUM BROMIDE 20 MG: 50 INJECTION, SOLUTION INTRAVENOUS at 12:23

## 2024-08-28 RX ADMIN — PIPERACILLIN AND TAZOBACTAM 4.5 G: 4; .5 INJECTION, POWDER, FOR SOLUTION INTRAVENOUS at 18:08

## 2024-08-28 RX ADMIN — TELMISARTAN 40 MG: 40 TABLET ORAL at 20:15

## 2024-08-28 RX ADMIN — HYDROMORPHONE HYDROCHLORIDE 1 MG: 2 INJECTION INTRAMUSCULAR; INTRAVENOUS; SUBCUTANEOUS at 10:49

## 2024-08-28 RX ADMIN — OXYCODONE HYDROCHLORIDE 10 MG: 5 SOLUTION ORAL at 14:16

## 2024-08-28 RX ADMIN — CELECOXIB 200 MG: 200 CAPSULE ORAL at 18:01

## 2024-08-28 RX ADMIN — HYDROMORPHONE HYDROCHLORIDE 0.5 MG: 2 INJECTION INTRAMUSCULAR; INTRAVENOUS; SUBCUTANEOUS at 13:13

## 2024-08-28 RX ADMIN — ONDANSETRON 8 MG: 2 INJECTION INTRAMUSCULAR; INTRAVENOUS at 13:29

## 2024-08-28 RX ADMIN — FENTANYL CITRATE 50 MCG: 50 INJECTION, SOLUTION INTRAMUSCULAR; INTRAVENOUS at 14:16

## 2024-08-28 RX ADMIN — ROPIVACAINE HYDROCHLORIDE 10 ML: 5 INJECTION EPIDURAL; INFILTRATION; PERINEURAL at 11:06

## 2024-08-28 RX ADMIN — ACETAMINOPHEN 1000 MG: 500 TABLET ORAL at 18:01

## 2024-08-28 RX ADMIN — SODIUM CHLORIDE, POTASSIUM CHLORIDE, SODIUM LACTATE AND CALCIUM CHLORIDE: 600; 310; 30; 20 INJECTION, SOLUTION INTRAVENOUS at 14:00

## 2024-08-28 RX ADMIN — SUGAMMADEX 200 MG: 100 INJECTION, SOLUTION INTRAVENOUS at 13:35

## 2024-08-28 RX ADMIN — DEXAMETHASONE SODIUM PHOSPHATE 8 MG: 4 INJECTION INTRA-ARTICULAR; INTRALESIONAL; INTRAMUSCULAR; INTRAVENOUS; SOFT TISSUE at 11:06

## 2024-08-28 RX ADMIN — ROCURONIUM BROMIDE 10 MG: 50 INJECTION, SOLUTION INTRAVENOUS at 12:52

## 2024-08-28 RX ADMIN — PRAVASTATIN SODIUM 80 MG: 20 TABLET ORAL at 18:01

## 2024-08-28 RX ADMIN — ONDANSETRON 4 MG: 2 INJECTION INTRAMUSCULAR; INTRAVENOUS at 14:16

## 2024-08-28 RX ADMIN — ROCURONIUM BROMIDE 20 MG: 50 INJECTION, SOLUTION INTRAVENOUS at 11:39

## 2024-08-28 RX ADMIN — ROPIVACAINE HYDROCHLORIDE 10 ML: 5 INJECTION EPIDURAL; INFILTRATION; PERINEURAL at 11:03

## 2024-08-28 RX ADMIN — PROPOFOL 200 MG: 10 INJECTION, EMULSION INTRAVENOUS at 10:55

## 2024-08-28 ASSESSMENT — PAIN DESCRIPTION - PAIN TYPE
TYPE: SURGICAL PAIN
TYPE: ACUTE PAIN
TYPE: SURGICAL PAIN
TYPE: SURGICAL PAIN
TYPE: ACUTE PAIN;SURGICAL PAIN
TYPE: SURGICAL PAIN

## 2024-08-28 ASSESSMENT — SOCIAL DETERMINANTS OF HEALTH (SDOH)
WITHIN THE LAST YEAR, HAVE TO BEEN RAPED OR FORCED TO HAVE ANY KIND OF SEXUAL ACTIVITY BY YOUR PARTNER OR EX-PARTNER?: NO
WITHIN THE LAST YEAR, HAVE YOU BEEN HUMILIATED OR EMOTIONALLY ABUSED IN OTHER WAYS BY YOUR PARTNER OR EX-PARTNER?: NO
WITHIN THE LAST YEAR, HAVE YOU BEEN KICKED, HIT, SLAPPED, OR OTHERWISE PHYSICALLY HURT BY YOUR PARTNER OR EX-PARTNER?: NO
WITHIN THE LAST YEAR, HAVE YOU BEEN AFRAID OF YOUR PARTNER OR EX-PARTNER?: NO

## 2024-08-28 ASSESSMENT — PAIN SCALES - GENERAL: PAIN_LEVEL: 0

## 2024-08-28 ASSESSMENT — PATIENT HEALTH QUESTIONNAIRE - PHQ9
SUM OF ALL RESPONSES TO PHQ9 QUESTIONS 1 AND 2: 0
1. LITTLE INTEREST OR PLEASURE IN DOING THINGS: NOT AT ALL
2. FEELING DOWN, DEPRESSED, IRRITABLE, OR HOPELESS: NOT AT ALL

## 2024-08-28 ASSESSMENT — FIBROSIS 4 INDEX: FIB4 SCORE: 0.74

## 2024-08-28 NOTE — ANESTHESIA TIME REPORT
Anesthesia Start and Stop Event Times       Date Time Event    8/28/2024 1031 Ready for Procedure     1047 Anesthesia Start     1349 Anesthesia Stop          Responsible Staff  08/28/24      Name Role Begin End    Kang Gilmore M.D. Anesth 1047 1349          Overtime Reason:  no overtime (within assigned shift)    Comments:

## 2024-08-28 NOTE — OR NURSING
1351 Pt arrived from OR via bed. Report given by anesthesia and RN. Sleeping, easy to arouse. 10L mask even non labored breathing. Lungs clear airway patent. SB. Skin pink warm dry. Piv patent. Abd soft rounded, non distended, x4 lap sites, cdi no drainage, no hematoma. Midline incision dressing minimal sanguinous drainage, x2 HUGH to bulb suction, 70cc, 90cc. Lane 500cc yellow clear. Piv patent.    1410 updated Bebe, spouse, via text     1416 awake, clear speech, follows commands. Moves x4 extremities. C/o pain and nausea. Treated per mar    1423 HUGH, 70cc, 30cc    1425 updated Bebe, spouse, via text     1426 tolerates sips of water.     1430 encouraged deep breathing.     1437 dressings cdi, no new drainage.     1440 updated Bebe, via text     1443 HUGH 70cc    1504 intermittent sleeping, face relaxed, even non labored breathing. Abd no changes, dressing cdi, no new drainage, rounded, soft, tender.     1512 Hugh 60cc serosanguinous    1541 updated Bebe, via text     1547 lane 75cc yellow clear     1553 report given to Hien RN, T414. Pt resting comfortably. Even non labored breathing. Face relaxed. Abd no changed. Soft rounded, dressing cdi, no new drainage. Skin pink warm dry.  Ready for transfer to room. Personal belongings with pt.

## 2024-08-28 NOTE — ANESTHESIA PROCEDURE NOTES
Peripheral Block    Date/Time: 8/28/2024 11:02 AM    Performed by: Kang Gilmore M.D.  Authorized by: Kang Gilmore M.D.    Start Time:  8/28/2024 11:02 AM  Reason for Block: at surgeon's request and post-op pain management ONLY    patient identified, IV checked, site marked, risks and benefits discussed, surgical consent, monitors and equipment checked, pre-op evaluation and timeout performed    Patient Position:  Supine  Prep: ChloraPrep    Monitoring:  Heart rate, continuous pulse ox and cardiac monitor  Block Region:  Trunk  Trunk - Block Type:  Rectus sheath plane block - TAP block    Laterality:  Bilateral  Procedures: ultrasound guided  Image captured, interpreted and electronically stored.  Local Infiltration:  Lidocaine  Strength:  1 %  Dose:  3 ml  Block Type:  Single-shot  Needle Length:  100mm  Needle Gauge:  21 G  Needle Localization:  Ultrasound guidance  Ultrasound picture in chart  Injection Assessment:  Negative aspiration for heme, no paresthesia on injection, incremental injection and local visualized surrounding nerve on ultrasound   Easy placement, good ultrasonic anatomy

## 2024-08-28 NOTE — ANESTHESIA PROCEDURE NOTES
Airway    Date/Time: 8/28/2024 10:57 AM    Performed by: Kang Gilmore M.D.  Authorized by: Kang Gilmore M.D.    Location:  OR  Urgency:  Elective  Indications for Airway Management:  Anesthesia      Spontaneous Ventilation: absent    Sedation Level:  Deep  Preoxygenated: Yes    Patient Position:  Sniffing  Mask Difficulty Assessment:  1 - vent by mask  Final Airway Type:  Endotracheal airway  Final Endotracheal Airway:  ETT  Cuffed: Yes    Technique Used for Successful ETT Placement:  Direct laryngoscopy    Insertion Site:  Oral  Blade Type:  Glide  Laryngoscope Blade/Videolaryngoscope Blade Size:  4  ETT Size (mm):  8.0  Measured from:  Teeth  ETT to Teeth (cm):  24  Placement Verified by: auscultation and capnometry    Cormack-Lehane Classification:  Grade I - full view of glottis  Number of Attempts at Approach:  1   SIVI, Easy mask, ATET, no teeth contact, soft bite block

## 2024-08-28 NOTE — ANESTHESIA PREPROCEDURE EVALUATION
Case: [id number] Date/Time: 08/28/24 1015    Procedures:       ROBOTIC-ASSISTED LAPAROSCOPIC LOW ANTERIOR RESECTION WITH FLEXIBLE SIGMOIDOSCOPY      SIGMOIDOSCOPY, FLEXIBLE    Pre-op diagnosis: MALIGNANT TUMOR OF COLON    Location: [place] OR 17 / SURGERY [facility]    Surgeons: Edgar Mayfield MD, PhD            Relevant Problems   ANESTHESIA   (positive) Obstructive sleep apnea      CARDIAC   (positive) Hypertension      ENDO   (positive) Type 2 diabetes mellitus without complication (HCC)      Other   (positive) Chronic idiopathic gout of left foot       Physical Exam    Airway   Mallampati: II  TM distance: >3 FB  Neck ROM: full       Cardiovascular - normal exam  Rhythm: regular  Rate: normal  (-) murmur     Dental - normal exam           Pulmonary - normal exam  Breath sounds clear to auscultation     Abdominal    Neurological - normal exam                   Anesthesia Plan    ASA 3   ASA physical status 3 criteria: morbid obesity - BMI greater than or equal to 40    Plan - general and peripheral nerve block     Peripheral nerve block will be post-op pain control  Airway plan will be ETT          Induction: intravenous    Postoperative Plan: Postoperative administration of opioids is intended.    Pertinent diagnostic labs and testing reviewed    Informed Consent:    Anesthetic plan and risks discussed with patient.    Use of blood products discussed with: patient whom consented to blood products.

## 2024-08-28 NOTE — ANESTHESIA POSTPROCEDURE EVALUATION
Patient: Moe Car    Procedure Summary       Date: 08/28/24 Room / Location: [place] [address] / SURGERY [facility]    Anesthesia Start: 1047 Anesthesia Stop: 1349    Procedure: ROBOTIC CONVERTED TO OPEN LEFT COLECTOMY AND OMENTECTOMY (Abdomen) Diagnosis: (SPLENIC FLEXURE COLON CANCER)    Surgeons: Edgar Mayfield MD, PhD Responsible Provider: Kang Gilmroe M.D.    Anesthesia Type: general, peripheral nerve block ASA Status: 3            Final Anesthesia Type: general, peripheral nerve block  Last vitals  BP        Temp        Pulse       Resp        SpO2          Anesthesia Post Evaluation    Patient location during evaluation: PACU  Patient participation: complete - patient participated  Level of consciousness: awake and alert  Pain score: 0    Airway patency: patent  Anesthetic complications: no  Cardiovascular status: hemodynamically stable  Respiratory status: acceptable  Hydration status: euvolemic    PONV: none          No notable events documented.     Nurse Pain Score: 0 (NPRS)

## 2024-08-29 LAB
ANION GAP SERPL CALC-SCNC: 13 MMOL/L (ref 7–16)
BUN SERPL-MCNC: 12 MG/DL (ref 8–22)
CALCIUM SERPL-MCNC: 8.7 MG/DL (ref 8.5–10.5)
CHLORIDE SERPL-SCNC: 104 MMOL/L (ref 96–112)
CO2 SERPL-SCNC: 19 MMOL/L (ref 20–33)
CREAT SERPL-MCNC: 0.78 MG/DL (ref 0.5–1.4)
ERYTHROCYTE [DISTWIDTH] IN BLOOD BY AUTOMATED COUNT: 53.5 FL (ref 35.9–50)
GFR SERPLBLD CREATININE-BSD FMLA CKD-EPI: 102 ML/MIN/1.73 M 2
GLUCOSE BLD STRIP.AUTO-MCNC: 140 MG/DL (ref 65–99)
GLUCOSE BLD STRIP.AUTO-MCNC: 140 MG/DL (ref 65–99)
GLUCOSE BLD STRIP.AUTO-MCNC: 153 MG/DL (ref 65–99)
GLUCOSE BLD STRIP.AUTO-MCNC: 167 MG/DL (ref 65–99)
GLUCOSE BLD STRIP.AUTO-MCNC: 177 MG/DL (ref 65–99)
GLUCOSE SERPL-MCNC: 153 MG/DL (ref 65–99)
HCT VFR BLD AUTO: 43.5 % (ref 42–52)
HGB BLD-MCNC: 13.2 G/DL (ref 14–18)
MCH RBC QN AUTO: 25.3 PG (ref 27–33)
MCHC RBC AUTO-ENTMCNC: 30.3 G/DL (ref 32.3–36.5)
MCV RBC AUTO: 83.3 FL (ref 81.4–97.8)
PLATELET # BLD AUTO: 294 K/UL (ref 164–446)
PMV BLD AUTO: 9.6 FL (ref 9–12.9)
POTASSIUM SERPL-SCNC: 4.8 MMOL/L (ref 3.6–5.5)
RBC # BLD AUTO: 5.22 M/UL (ref 4.7–6.1)
SODIUM SERPL-SCNC: 136 MMOL/L (ref 135–145)
WBC # BLD AUTO: 13.6 K/UL (ref 4.8–10.8)

## 2024-08-29 PROCEDURE — 700102 HCHG RX REV CODE 250 W/ 637 OVERRIDE(OP)

## 2024-08-29 PROCEDURE — 700105 HCHG RX REV CODE 258

## 2024-08-29 PROCEDURE — 700102 HCHG RX REV CODE 250 W/ 637 OVERRIDE(OP): Performed by: SURGERY

## 2024-08-29 PROCEDURE — 85027 COMPLETE CBC AUTOMATED: CPT

## 2024-08-29 PROCEDURE — 700111 HCHG RX REV CODE 636 W/ 250 OVERRIDE (IP): Mod: JZ

## 2024-08-29 PROCEDURE — 80048 BASIC METABOLIC PNL TOTAL CA: CPT

## 2024-08-29 PROCEDURE — 770001 HCHG ROOM/CARE - MED/SURG/GYN PRIV*

## 2024-08-29 PROCEDURE — 700111 HCHG RX REV CODE 636 W/ 250 OVERRIDE (IP): Mod: JZ | Performed by: SURGERY

## 2024-08-29 PROCEDURE — A9270 NON-COVERED ITEM OR SERVICE: HCPCS

## 2024-08-29 PROCEDURE — 82962 GLUCOSE BLOOD TEST: CPT | Mod: 91

## 2024-08-29 PROCEDURE — A9270 NON-COVERED ITEM OR SERVICE: HCPCS | Performed by: SURGERY

## 2024-08-29 PROCEDURE — 36415 COLL VENOUS BLD VENIPUNCTURE: CPT

## 2024-08-29 RX ORDER — ENOXAPARIN SODIUM 100 MG/ML
40 INJECTION SUBCUTANEOUS EVERY 12 HOURS
Status: DISCONTINUED | OUTPATIENT
Start: 2024-08-29 | End: 2024-09-01 | Stop reason: HOSPADM

## 2024-08-29 RX ORDER — METFORMIN HYDROCHLORIDE 750 MG/1
750 TABLET, EXTENDED RELEASE ORAL DAILY
Status: DISCONTINUED | OUTPATIENT
Start: 2024-08-29 | End: 2024-09-01 | Stop reason: HOSPADM

## 2024-08-29 RX ORDER — HYDROCHLOROTHIAZIDE 12.5 MG/1
12.5 TABLET ORAL DAILY
Status: DISCONTINUED | OUTPATIENT
Start: 2024-08-29 | End: 2024-09-01 | Stop reason: HOSPADM

## 2024-08-29 RX ADMIN — AMLODIPINE BESYLATE 10 MG: 10 TABLET ORAL at 17:32

## 2024-08-29 RX ADMIN — METOPROLOL SUCCINATE 200 MG: 100 TABLET, EXTENDED RELEASE ORAL at 06:06

## 2024-08-29 RX ADMIN — PRAVASTATIN SODIUM 80 MG: 20 TABLET ORAL at 17:34

## 2024-08-29 RX ADMIN — PIPERACILLIN AND TAZOBACTAM 4.5 G: 4; .5 INJECTION, POWDER, FOR SOLUTION INTRAVENOUS at 20:28

## 2024-08-29 RX ADMIN — ALLOPURINOL 300 MG: 300 TABLET ORAL at 06:06

## 2024-08-29 RX ADMIN — PIPERACILLIN AND TAZOBACTAM 4.5 G: 4; .5 INJECTION, POWDER, FOR SOLUTION INTRAVENOUS at 14:12

## 2024-08-29 RX ADMIN — OXYCODONE HYDROCHLORIDE 5 MG: 5 TABLET ORAL at 04:17

## 2024-08-29 RX ADMIN — ENOXAPARIN SODIUM 40 MG: 100 INJECTION SUBCUTANEOUS at 08:20

## 2024-08-29 RX ADMIN — TELMISARTAN 40 MG: 40 TABLET ORAL at 17:33

## 2024-08-29 RX ADMIN — ACETAMINOPHEN 1000 MG: 500 TABLET ORAL at 14:08

## 2024-08-29 RX ADMIN — PIPERACILLIN AND TAZOBACTAM 4.5 G: 4; .5 INJECTION, POWDER, FOR SOLUTION INTRAVENOUS at 04:18

## 2024-08-29 RX ADMIN — ENOXAPARIN SODIUM 40 MG: 100 INJECTION SUBCUTANEOUS at 17:34

## 2024-08-29 RX ADMIN — CELECOXIB 200 MG: 200 CAPSULE ORAL at 17:33

## 2024-08-29 RX ADMIN — CELECOXIB 200 MG: 200 CAPSULE ORAL at 06:06

## 2024-08-29 RX ADMIN — OXYCODONE HYDROCHLORIDE 5 MG: 5 TABLET ORAL at 08:20

## 2024-08-29 RX ADMIN — ACETAMINOPHEN 1000 MG: 500 TABLET ORAL at 17:33

## 2024-08-29 RX ADMIN — HYDROCHLOROTHIAZIDE 12.5 MG: 12.5 TABLET ORAL at 14:08

## 2024-08-29 RX ADMIN — METFORMIN HYDROCHLORIDE 750 MG: 750 TABLET, EXTENDED RELEASE ORAL at 11:03

## 2024-08-29 RX ADMIN — INSULIN HUMAN 1 UNITS: 100 INJECTION, SOLUTION PARENTERAL at 11:09

## 2024-08-29 RX ADMIN — INSULIN HUMAN 1 UNITS: 100 INJECTION, SOLUTION PARENTERAL at 17:34

## 2024-08-29 RX ADMIN — ACETAMINOPHEN 1000 MG: 500 TABLET ORAL at 04:17

## 2024-08-29 ASSESSMENT — PAIN DESCRIPTION - PAIN TYPE
TYPE: ACUTE PAIN
TYPE: ACUTE PAIN;SURGICAL PAIN
TYPE: ACUTE PAIN;SURGICAL PAIN

## 2024-08-29 ASSESSMENT — COGNITIVE AND FUNCTIONAL STATUS - GENERAL
SUGGESTED CMS G CODE MODIFIER MOBILITY: CJ
DAILY ACTIVITIY SCORE: 24
STANDING UP FROM CHAIR USING ARMS: A LITTLE
MOBILITY SCORE: 22
WALKING IN HOSPITAL ROOM: A LITTLE
SUGGESTED CMS G CODE MODIFIER DAILY ACTIVITY: CH

## 2024-08-29 NOTE — PROGRESS NOTES
S:  59 y.o.male s/p left colectomy  POD# 1.  Patient minimal ambulation overnight.  Tolerating a diet.  Denies flatus.  Denies nausea, vomiting.    O:  /65   Pulse 65   Temp 37 °C (98.6 °F) (Temporal)   Resp 17   Wt (!) 147 kg (325 lb)   SpO2 93%   I/O last 3 completed shifts:  In: 780 [P.O.:620]  Out: 2405 [Urine:1725; Drains:680]  Recent Labs     08/29/24  0111   SODIUM 136   POTASSIUM 4.8   CHLORIDE 104   CO2 19*   GLUCOSE 153*   BUN 12   CREATININE 0.78   CALCIUM 8.7     Recent Labs     08/29/24  0111   WBC 13.6*   RBC 5.22   HEMOGLOBIN 13.2*   HEMATOCRIT 43.5   MCV 83.3   MCH 25.3*   MCHC 30.3*   RDW 53.5*   PLATELETCT 294   MPV 9.6       Alert and Oriented x3, No Acute Distress  Normal Respiratory Effort  Abdomen soft, appropriately tender  Incisions/Bandages clean/dry/intact  Extremities warm and well perfused  HUGH Output Serosanguinous    A/P:  Patient encouraged to take meals in the chair rather then the bed.  Patient educated on appropriate judicious use of narcotics.  Hep-Lock IV.  Encourage by mouth intake.  Mcgrath catheter removed and patient voiding appropriately.  Advance to low residue diet.  Patient encouraged to ambulate ad sandra.  Pathology pending.  Continue HUGH drains.  Continue IV antibiotics for 4 days for peritoneal contamination due to tumor perforation  Acute care surgery service will cover for me this weekend.  Edgar Mayfield M.D. PhD  [facility]  Colon and Rectal Surgery  (Office) [phone]  (Cell)  [phone]

## 2024-08-29 NOTE — PROGRESS NOTES
Pt arrived to the floor via bed with transport. Pt verbalizes pain 10/10 on abdomen. Pt medicated per MAR. Pt A&O 4, ERAS 10L via oxy mask until 2000. Dressing dry and intact with old drainage in place. Pt has 2 HUGH drains and a lane in place. Pt refuses skin assessment at this time due to pain. Pt educated on fall precautions and to call for assistance as needed. Pt verbalizes understanding. Family at bedside. All questions answered at this time.

## 2024-08-29 NOTE — PROGRESS NOTES
Report received at bedside from previous shift RN   Assumed care. Pt in bed. A/O x 4. VS stable  Responds appropriately.   Pain at 6/10 Medicated per MAR, denies SOB/ denies chest pain. Provided non pharmacological interventions for comfort.  Denies N/V tolerating regular diet appropriately  Patient on 2L Nasal cannula  IS at 1000  -Void (Mcgrath removed 0615), -flatus, last BM PTA per report  Patient ambulates x1/FWW   SCDs on  MLI to g/h  Lap sites to g/tegaderm  X2 HUGH drains to RLQ  Assessment complete.   Discussed POC, pt verbalizes understanding.   Explained importance of calling before getting OOB.   Call light and belongings within reach. Bed in the lowest position. Treaded socks in place. Hourly rounding in progress.

## 2024-08-29 NOTE — PROGRESS NOTES
Report received from jona RN  Pt AAOx4, Alert. Responds appropriately  10 L Eras until 8 pm  Pain managed per MAR, resting comfortably.  +void via lane, no flatus, no bm  Regular diet, tolerating well. No n/v  Ambulated x1 in hallway, standby wt FWW  Calls appropriately for assistance  SCDs on    Skin: mli with gauze/medipore, lap sites, 2 miya drains    POC reviewed, education provided. Bed locked and in low position, pt instructed to call for assistance. Comprehension verbalized. Call light within reach, all needs met at this time.

## 2024-08-29 NOTE — DIETARY
NUTRITION SERVICES: BMI - Pt with BMI >40 (=Body mass index is 45.33 kg/m².) which indicates class III obesity. Weight loss counseling not appropriate in acute care setting. Pt s/p ROBOTIC-ASSISTED LAPAROSCOPIC LOW ANTERIOR RESECTION WITH FLEXIBLE SIGMOIDOSCOPY. Anticipate follow up planned with MD/RD post discharge.     RECOMMEND - Referral to outpatient nutrition services for weight management, or follow up with MD/RD after discharge.

## 2024-08-29 NOTE — OP REPORT
Operative Report    Date: 8/29/2024    Surgeon: Edgar Mayfield MD, PhD    Assistant: Clarita SMITH  The indications for a surgical assistant in this surgery were indicated due to complexity of the procedure. Their role included aiding in incision, retraction, holding devices including camera for laparoscopic procedure, and closure of the wound.      Anesthesiologist: Dr. Gilmore    Pre-operative Diagnosis:  Left colon cancer.  Morbid obesity with BMI greater than 45.  Gouty arthritis.  Hypertension.  Obstructive sleep apnea.  Diabetes    Post-operative Diagnosis: splenic flexure colon cancer.   Morbid obesity with BMI greater than 45.  Gouty arthritis.  Hypertension.  Obstructive sleep apnea.  Diabetes    Procedure:   1)  ROBOTIC ASSISTED converted to open left colectomy with splenic flexure mobilization  2) omentectomy  Clinical preamble:    This is a 59 y.o. male who presented with left-sided colon cancer    An extensive PARQ conference was held with the patient and his family, in regard to staging and treatment of colon cancer. The patient was made aware of the alternatives, including operative and non-operative: expectant management. The risks of bleeding, infection, damage to surrounding structures, need for reoperation, leak, hernia, fistula, stroke, MI, and death were discussed with the patient. The patient was given a chance to ask questions, and all his questions were answered. The patient demonstrates adequate understanding, seems pleased with the plan, and wishes to proceed.    Procedure in detail: After informed consent was obtained, the patient was taken to the operating room, placed in supine position on a pink pad. The patient underwent general endotracheal anesthesia without incident.  The patient's arms were tucked and the chest and shoulders were padded and secured to the operating room table.   The patient was then moved to lithotomy in yellowfin stirps with all skin and joint  surfaces padded and protected appropriately.The rectum was washed out with Betadine and the abdomen was prepped and draped in a sterile fashion. A timeout was performed verifying the correct patient, procedure, site, positioning in availability of equipment prior to the start of surgery.    Operation was begun by placing a 5 mm periumbilical incision through which a 5 mm trocar was introduced into the abdomen using the Optiview technique. After pneumoperitoneum was achieved, additional trocars were placed, 15 mm in the right lower quadrant and 8 mm in the left upper quadrant. An 8 and a 5 mm assistant port were placed and the camera port was upsized to an 8 mm trocar as well. All trocars were placed with TAP Block performed by anesthesia with ultrasound guidance.    The patient was positioned in steep Trendelenburg and right lateral decubitus and before the da Tri robotic system was docked the patient was noted to be securely  positioned on the table.  We took down the left lateral attachments of the sigmoid colon, identifying the left ureter which was preserved throughout the remainder of the procedure.  It took some time, but I identified the mass at the splenic flexure densely adhered to the omentum and splenic flexure adhesions.  I took down the gastrocolic ligament and found that the mass was located at the hilum of the spleen.  There did not appear to be invasion into the spleen or splenic vessels.  The patient's habitus and large omentum made it difficult to gain entry site, divided the HALLE with a vessel sealer. As I continued my dissection, I found evidence of perforation at the tumor site. I then converted to an open procedure.  I made an upper midline incision.    Wound retractor was secured into position and the transverse colon delivered out onto the operative field. I divided the gastrocolic ligament and then the left branch of the middle colic artery.  I then divided the distal transverse colon with  a linear cutting stapler.  I then divided the proximal sigmoid colon with a linear cutting stapler and attempted to deliver the specimen onto the field.  However, it was firmly adhered to at the splenic flexure.I performed an omentectomy for better visualization.  This was performed with LigaSure device and hemostasis was observed.  I took out all of the splenic flexure adhesions.  I oversewed the tumor site at the site of perforation.  I irrigated the wound with 6 L of warm saline.  I then divided the transverse and descending colon mesentery with a LigaSure device and passed the specimen off the field.  I then created a side-to-side functional end-to-end colocolonic anastomosis with distal transverse and sigmoid colon.  We observed hemostasis throughout the operative field, closed the 12 mm trocar site with an Endoclose device.   At this point numbers of the operating team changed into clean gown and gloves and all of the instruments used in the previous portions of the procedure were moved away from the operating field.  Instruments which had not been previously detached during  The case were now used  For the remainder of the procedure.  2 HUGH drains were placed at the splenic flexure through the trocar incision sites.  These were sutured into position.The extraction site was closed  with running 0 PDS sutures in the posterior and anterior rectus sheaths.   Skin incision was closed with skin staplesl. Dressings were placed and the patient returned to the PACU in stable condition. All instruments counts were correct at the end of the procedureThe patient was awakened from general anesthetic, and was taken to the recovery room in stable condition.    Specimen: transverse and descending colonwith stitch marking distal and  omentum  EBL: 400mL    UOP: See anesthetic record    Drains: None    Dispo: PACU    Summary:     Colon Resection  Operation performed with curative intent Yes   Tumor Location Splenic flexure    Extent of colon and vascular resection Left hemicolectomy - inferior mesenteric    Edgar Mayfield MD PhD  [facility]  Colon and Rectal Surgeon  [phone]

## 2024-08-29 NOTE — CARE PLAN
The patient is Stable - Low risk of patient condition declining or worsening    Shift Goals  Clinical Goals: ambulation, drain mgmt, pain control  Patient Goals: pain control    Progress made toward(s) clinical / shift goals:  safety maintained, ambulated in hallway. Jerson drain with serosang output. Mcgrath in place. Education provide don poc, pain well managed per mar    Problem: Pain - Standard  Goal: Alleviation of pain or a reduction in pain to the patient’s comfort goal  Description: Target End Date:  Prior to discharge or change in level of care    Document on Vitals flowsheet    1.  Document pain using the appropriate pain scale per order or unit policy  2.  Educate and implement non-pharmacologic comfort measures (i.e. relaxation, distraction, massage, cold/heat therapy, etc.)  3.  Pain management medications as ordered  4.  Reassess pain after pain med administration per policy  5.  If opiods administered assess patient's response to pain medication is appropriate per POSS sedation scale  6.  Follow pain management plan developed in collaboration with patient and interdisciplinary team (including palliative care or pain specialists if applicable)  Outcome: Progressing

## 2024-08-30 LAB
ANION GAP SERPL CALC-SCNC: 9 MMOL/L (ref 7–16)
BUN SERPL-MCNC: 16 MG/DL (ref 8–22)
CALCIUM SERPL-MCNC: 8.7 MG/DL (ref 8.5–10.5)
CHLORIDE SERPL-SCNC: 104 MMOL/L (ref 96–112)
CO2 SERPL-SCNC: 25 MMOL/L (ref 20–33)
CREAT SERPL-MCNC: 0.77 MG/DL (ref 0.5–1.4)
ERYTHROCYTE [DISTWIDTH] IN BLOOD BY AUTOMATED COUNT: 54.3 FL (ref 35.9–50)
GFR SERPLBLD CREATININE-BSD FMLA CKD-EPI: 102 ML/MIN/1.73 M 2
GLUCOSE BLD STRIP.AUTO-MCNC: 141 MG/DL (ref 65–99)
GLUCOSE BLD STRIP.AUTO-MCNC: 143 MG/DL (ref 65–99)
GLUCOSE BLD STRIP.AUTO-MCNC: 145 MG/DL (ref 65–99)
GLUCOSE BLD STRIP.AUTO-MCNC: 152 MG/DL (ref 65–99)
GLUCOSE SERPL-MCNC: 150 MG/DL (ref 65–99)
HCT VFR BLD AUTO: 35.9 % (ref 42–52)
HGB BLD-MCNC: 11.2 G/DL (ref 14–18)
MCH RBC QN AUTO: 25.6 PG (ref 27–33)
MCHC RBC AUTO-ENTMCNC: 31.2 G/DL (ref 32.3–36.5)
MCV RBC AUTO: 82 FL (ref 81.4–97.8)
PLATELET # BLD AUTO: 226 K/UL (ref 164–446)
PMV BLD AUTO: 9.8 FL (ref 9–12.9)
POTASSIUM SERPL-SCNC: 3.9 MMOL/L (ref 3.6–5.5)
RBC # BLD AUTO: 4.38 M/UL (ref 4.7–6.1)
SODIUM SERPL-SCNC: 138 MMOL/L (ref 135–145)
WBC # BLD AUTO: 12.2 K/UL (ref 4.8–10.8)

## 2024-08-30 PROCEDURE — 85027 COMPLETE CBC AUTOMATED: CPT

## 2024-08-30 PROCEDURE — 700111 HCHG RX REV CODE 636 W/ 250 OVERRIDE (IP): Mod: JZ | Performed by: SURGERY

## 2024-08-30 PROCEDURE — 80048 BASIC METABOLIC PNL TOTAL CA: CPT

## 2024-08-30 PROCEDURE — 700105 HCHG RX REV CODE 258

## 2024-08-30 PROCEDURE — 700102 HCHG RX REV CODE 250 W/ 637 OVERRIDE(OP)

## 2024-08-30 PROCEDURE — 36415 COLL VENOUS BLD VENIPUNCTURE: CPT

## 2024-08-30 PROCEDURE — 82962 GLUCOSE BLOOD TEST: CPT | Mod: 91

## 2024-08-30 PROCEDURE — A9270 NON-COVERED ITEM OR SERVICE: HCPCS | Performed by: SURGERY

## 2024-08-30 PROCEDURE — 700102 HCHG RX REV CODE 250 W/ 637 OVERRIDE(OP): Performed by: SURGERY

## 2024-08-30 PROCEDURE — 700111 HCHG RX REV CODE 636 W/ 250 OVERRIDE (IP): Mod: JZ

## 2024-08-30 PROCEDURE — 770001 HCHG ROOM/CARE - MED/SURG/GYN PRIV*

## 2024-08-30 PROCEDURE — A9270 NON-COVERED ITEM OR SERVICE: HCPCS

## 2024-08-30 RX ADMIN — CELECOXIB 200 MG: 200 CAPSULE ORAL at 05:51

## 2024-08-30 RX ADMIN — ACETAMINOPHEN 1000 MG: 500 TABLET ORAL at 00:09

## 2024-08-30 RX ADMIN — CELECOXIB 200 MG: 200 CAPSULE ORAL at 16:41

## 2024-08-30 RX ADMIN — ENOXAPARIN SODIUM 40 MG: 100 INJECTION SUBCUTANEOUS at 05:51

## 2024-08-30 RX ADMIN — METFORMIN HYDROCHLORIDE 750 MG: 750 TABLET, EXTENDED RELEASE ORAL at 05:53

## 2024-08-30 RX ADMIN — PIPERACILLIN AND TAZOBACTAM 4.5 G: 4; .5 INJECTION, POWDER, FOR SOLUTION INTRAVENOUS at 21:17

## 2024-08-30 RX ADMIN — ALLOPURINOL 300 MG: 300 TABLET ORAL at 05:50

## 2024-08-30 RX ADMIN — OXYCODONE HYDROCHLORIDE 5 MG: 5 TABLET ORAL at 05:58

## 2024-08-30 RX ADMIN — PIPERACILLIN AND TAZOBACTAM 4.5 G: 4; .5 INJECTION, POWDER, FOR SOLUTION INTRAVENOUS at 12:01

## 2024-08-30 RX ADMIN — ENOXAPARIN SODIUM 40 MG: 100 INJECTION SUBCUTANEOUS at 16:42

## 2024-08-30 RX ADMIN — OXYCODONE HYDROCHLORIDE 5 MG: 5 TABLET ORAL at 16:41

## 2024-08-30 RX ADMIN — PIPERACILLIN AND TAZOBACTAM 4.5 G: 4; .5 INJECTION, POWDER, FOR SOLUTION INTRAVENOUS at 05:49

## 2024-08-30 RX ADMIN — ACETAMINOPHEN 1000 MG: 500 TABLET ORAL at 16:40

## 2024-08-30 RX ADMIN — PRAVASTATIN SODIUM 80 MG: 20 TABLET ORAL at 16:40

## 2024-08-30 RX ADMIN — ACETAMINOPHEN 1000 MG: 500 TABLET ORAL at 23:46

## 2024-08-30 RX ADMIN — HYDROCHLOROTHIAZIDE 12.5 MG: 12.5 TABLET ORAL at 05:50

## 2024-08-30 RX ADMIN — TELMISARTAN 40 MG: 40 TABLET ORAL at 16:48

## 2024-08-30 RX ADMIN — ACETAMINOPHEN 1000 MG: 500 TABLET ORAL at 05:50

## 2024-08-30 RX ADMIN — METOPROLOL SUCCINATE 200 MG: 100 TABLET, EXTENDED RELEASE ORAL at 05:51

## 2024-08-30 RX ADMIN — INSULIN HUMAN 1 UNITS: 100 INJECTION, SOLUTION PARENTERAL at 16:44

## 2024-08-30 RX ADMIN — AMLODIPINE BESYLATE 10 MG: 10 TABLET ORAL at 16:40

## 2024-08-30 RX ADMIN — OXYCODONE HYDROCHLORIDE 5 MG: 5 TABLET ORAL at 09:34

## 2024-08-30 ASSESSMENT — PAIN DESCRIPTION - PAIN TYPE
TYPE: ACUTE PAIN

## 2024-08-30 NOTE — PROGRESS NOTES
Bedside report received from night shift nurse. Assumed care at 0645.   Pt A&Ox4  Tolerating CHO diet, denies n/v. Hypoactive bowel sounds, passing flatus, LBM PTA. IV access through 18g L hand that is running zosyn.  MLI w/ gauze and tape, CDI. 4x lap sites w/ gauze and tape, CDI. 1x HUGH to ML and 1x HUGH to RLQ, sanguinous drainage.   Saturating >90% on 1.5L NC.  Pt ambulates SBA.  Pain is controlled through medication orders. Updated on plan of care. Safety education provided. Bed locked in low. Call light within reach. Rounding in place.

## 2024-08-30 NOTE — PROGRESS NOTES
Bedside report received from Tonia GONZALEZ. Assumed care of patient at 1845.  Assessment complete.    Patient A&O x 4. Patient calling appropriately.  Mobility: Patient ambulates with SB assist.   Fall Risk Assessment: Low fall risk per Najera Ted score. Bed alarm n/a.   Pain: Patient reports pain to abdomen surgical sites, pain controlled with scheduled medications. Patient educated on pain rating scale, PRN medications for pain management, and nonpharmacologic measures for pain control.   Diet: Tolerating regular diet, changed to CHO per pt request/protocol. Denies nausea/vomiting.   LDA:    Access: 18LHand, dressing CDI, running Zosyn per MAR   Drains: x2 HUGH drains to abdomen to bulb suction, serosang drainage, gauze/tape   Surgical incisions: x4 lap sites with gauze/tegaderm, MLI with gauze/teg  Noah Score: Minimal risk for skin breakdown. Interventions: n/a  GI/:   + void, adequate clear/yellow UO  - flatus  Last BM PTA  DVT Prophylaxis: Lovenox. SCD's on, educated on purpose.   Respiratory: Patient SaO2 >90% on 1.5LNC, denies SOB. IS at bedside.     Reviewed plan of care with patient. Call light and personal belongings within reach. Hourly rounding in place. All needs met at this time.

## 2024-08-30 NOTE — CARE PLAN
The patient is Stable - Low risk of patient condition declining or worsening    Shift Goals  Clinical Goals: ambulate at least 500 feet by end of shift  Patient Goals: rest, ambulate    Progress made toward(s) clinical / shift goals:  Pain controlled with scheduled medications and rest. Patient able to complete ADLs with minimal discomfort. Patient ambulated with  feet, tolerated well. Dressings to abdomen dry and intact. HUGH drains to bulb suction, serosang output.   Problem: Pain - Standard  Goal: Alleviation of pain or a reduction in pain to the patient’s comfort goal  Description: Target End Date:  Prior to discharge or change in level of care    Document on Vitals flowsheet    1.  Document pain using the appropriate pain scale per order or unit policy  2.  Educate and implement non-pharmacologic comfort measures (i.e. relaxation, distraction, massage, cold/heat therapy, etc.)  3.  Pain management medications as ordered  4.  Reassess pain after pain med administration per policy  5.  If opiods administered assess patient's response to pain medication is appropriate per POSS sedation scale  6.  Follow pain management plan developed in collaboration with patient and interdisciplinary team (including palliative care or pain specialists if applicable)  Outcome: Progressing     Problem: Knowledge Deficit - Standard  Goal: Patient and family/care givers will demonstrate understanding of plan of care, disease process/condition, diagnostic tests and medications  Description: Target End Date:  1-3 days or as soon as patient condition allows    Document in Patient Education    1.  Patient and family/caregiver oriented to unit, equipment, visitation policy and means for communicating concern  2.  Complete/review Learning Assessment  3.  Assess knowledge level of disease process/condition, treatment plan, diagnostic tests and medications  4.  Explain disease process/condition, treatment plan, diagnostic tests and  medications  Outcome: Progressing     Problem: Nutrition  Goal: Patient's nutritional and fluid intake will be adequate or improve  Description: Target End Date:  Prior to discharge or change in level of care    Document on I/O flowsheet    1.  Monitor nutritional intake  2.  Monitor weight per provider order  3.  Assess patient's ability to take oral nutrition  4.  Collaborate with Speech Therapy, Dietitian and interdisciplinary team for appropriate feeding and fluid intake  5.  Assist with feeding  Outcome: Progressing     Problem: Urinary Elimination  Goal: Establish and maintain regular urinary output  Description: Target End Date:  Prior to discharge or change in level of care    Document on I/O and Assessment flowsheets    1.  Evaluate need to continue indwelling catheter every shift  2.  Assess signs and symptoms of urinary retention  3.  Assess post-void residual volumes  4.  Implement bladder training program  5.  Encourage scheduled voidings  6.  Assist patient to sit on bedside commode or toilet for voiding  7.  Educate patient and family/caregiver on use and purpose of urine collection devices (document in Patient Education)  Outcome: Progressing     Problem: Bowel Elimination  Goal: Establish and maintain regular bowel function  Description: Target End Date:  Prior to discharge or change in level of care    1.   Note date of last BM  2.   Educate about diet, fluid intake, medication and activity to promote bowel function  3.   Educate signs and symptoms of constipation and interventions to implement  4.   Pharmacologic bowel management per provider order  5.   Regular toileting schedule  6.   Upright position for toileting  7.   High fiber diet  8.   Encourage hydration  9.   Collaborate with Clinical Dietician  10. Care and maintenance of ostomy if applicable  Outcome: Progressing     Problem: Mobility  Goal: Patient's capacity to carry out activities will improve  Description: Target End Date:  Prior  to discharge or change in level of care    1.  Assess for barriers to mobility/activity  2.  Implement activity per interdisciplinary team recommendations  3.  Target activity level identified and patient/family/caregiver aware of goal  4.  Provide assistive devices  5.  Instruct patient/caregiver on proper use of assistive/adaptive devices  6.  Schedule activities and rest periods to decrease effects of fatigue  7.  Encourage mobilization to extent of ability  8.  Maintain proper body alignment  9.  Provide adequate pain management to allow progressive mobilization  10. Implement pace maker precautions as needed  Outcome: Progressing     Problem: Self Care  Goal: Patient will have the ability to perform ADLs independently or with assistance (bathe, groom, dress, toilet and feed)  Description: Target End Date:  Prior to discharge or change in level of care    Document on ADL flowsheet    1.  Assess the capability and level of deficiency to perform ADLs  2.  Encourage family/care giver involvement  3.  Provide assistive devices  4.  Consider PT/OT evaluations  5.  Maintain support, give positive feedback, encourage self-care allowing extra time and verbal cuing as needed  6.  Avoid doing something for patients they can do themselves, but provide assistance as needed  7.  Assist in anticipating/planning individual needs  8.  Collaborate with Case Management and  to meet discharge needs  Outcome: Progressing     Problem: Infection - Standard  Goal: Patient will remain free from infection  Description: Target End Date:  Prior to discharge or change in level of care    1.  Utilize Standard Precautions at all times to reduce the risk of transmission of microorganisms from both recognized and  unrecognized sources of infection  2.  Infection prevention handouts provided (general/device/diagnosis specific) and documented in Patient Education  3.  Educate patient and family/caregiver on isolation precautions  if applicable  Outcome: Progressing     Problem: Wound/ / Incision Healing  Goal: Patient's wound/surgical incision will decrease in size and heals properly  Description: Target End Date:  Prior to discharge or change in level of care    Document on LDA    1.  Assess and document surgical incision/wound  2.  Provide incision/wound care per policy and/or provider orders  3.  Manage surgical drains per policy if applicable  4.  Encourage adequate nutrition to promote wound healing  5.  Collaborate with Clinical Dietician  Outcome: Progressing       Patient is not progressing towards the following goals:

## 2024-08-30 NOTE — PROGRESS NOTES
General Surgery Progress Note  DATE: 8/30/2024    Post Operative Day 2  left colectomy .    INTERVAL EVENTS:  Patient is ambulating well.  His drains are still somewhat sanguinous.  He denies having bowel movements or flatus at this point.  No nausea or vomiting.  Abdomen is soft and appropriately tender.    PHYSICAL EXAMINATION:  Vital Signs: /64   Pulse 72   Temp 36.4 °C (97.5 °F) (Temporal)   Resp 18   Wt (!) 147 kg (325 lb)   SpO2 92%     Awake and alert.    Breathing comfortably on room air.  Abdomen soft and appropriately tender.  Incisions are clean dry and intact.  2 drains with sanguinous output.    LABORATORY VALUES:  Recent Labs     08/29/24  0111 08/30/24 0432   WBC 13.6* 12.2*   RBC 5.22 4.38*   HEMOGLOBIN 13.2* 11.2*   HEMATOCRIT 43.5 35.9*   MCV 83.3 82.0   MCH 25.3* 25.6*   MCHC 30.3* 31.2*   RDW 53.5* 54.3*   PLATELETCT 294 226   MPV 9.6 9.8     Recent Labs     08/29/24  0111 08/30/24 0432   SODIUM 136 138   POTASSIUM 4.8 3.9   CHLORIDE 104 104   CO2 19* 25   GLUCOSE 153* 150*   BUN 12 16   CREATININE 0.78 0.77   CALCIUM 8.7 8.7                IMAGING:  No orders to display       ASSESSMENT AND PLAN:  No new Assessment & Plan notes have been filed under this hospital service since the last note was generated.  Service: Surgery General    Postop day 2 status post colectomy.  Continue to monitor drain output.  Diet as tolerated.  Continue antibiotics for 4 days for peritoneal contamination.  Encourage ambulation.  Lovenox for DVT prophylaxis.       ____________________________________     Luba Lim M.D.    DD: 8/30/2024  4:32 PM

## 2024-08-31 VITALS
BODY MASS INDEX: 46.58 KG/M2 | WEIGHT: 315 LBS | RESPIRATION RATE: 18 BRPM | SYSTOLIC BLOOD PRESSURE: 140 MMHG | DIASTOLIC BLOOD PRESSURE: 73 MMHG | TEMPERATURE: 98 F | OXYGEN SATURATION: 93 % | HEART RATE: 67 BPM

## 2024-08-31 LAB
ANION GAP SERPL CALC-SCNC: 11 MMOL/L (ref 7–16)
BUN SERPL-MCNC: 12 MG/DL (ref 8–22)
CALCIUM SERPL-MCNC: 8.8 MG/DL (ref 8.5–10.5)
CHLORIDE SERPL-SCNC: 104 MMOL/L (ref 96–112)
CO2 SERPL-SCNC: 24 MMOL/L (ref 20–33)
CREAT SERPL-MCNC: 0.59 MG/DL (ref 0.5–1.4)
ERYTHROCYTE [DISTWIDTH] IN BLOOD BY AUTOMATED COUNT: 54.8 FL (ref 35.9–50)
GFR SERPLBLD CREATININE-BSD FMLA CKD-EPI: 111 ML/MIN/1.73 M 2
GLUCOSE BLD STRIP.AUTO-MCNC: 128 MG/DL (ref 65–99)
GLUCOSE BLD STRIP.AUTO-MCNC: 133 MG/DL (ref 65–99)
GLUCOSE BLD STRIP.AUTO-MCNC: 138 MG/DL (ref 65–99)
GLUCOSE BLD STRIP.AUTO-MCNC: 157 MG/DL (ref 65–99)
GLUCOSE SERPL-MCNC: 146 MG/DL (ref 65–99)
HCT VFR BLD AUTO: 35.5 % (ref 42–52)
HGB BLD-MCNC: 11.2 G/DL (ref 14–18)
MCH RBC QN AUTO: 26.2 PG (ref 27–33)
MCHC RBC AUTO-ENTMCNC: 31.5 G/DL (ref 32.3–36.5)
MCV RBC AUTO: 82.9 FL (ref 81.4–97.8)
PLATELET # BLD AUTO: 228 K/UL (ref 164–446)
PMV BLD AUTO: 9.6 FL (ref 9–12.9)
POTASSIUM SERPL-SCNC: 3.5 MMOL/L (ref 3.6–5.5)
RBC # BLD AUTO: 4.28 M/UL (ref 4.7–6.1)
SODIUM SERPL-SCNC: 139 MMOL/L (ref 135–145)
WBC # BLD AUTO: 11.6 K/UL (ref 4.8–10.8)

## 2024-08-31 PROCEDURE — 700102 HCHG RX REV CODE 250 W/ 637 OVERRIDE(OP)

## 2024-08-31 PROCEDURE — 700102 HCHG RX REV CODE 250 W/ 637 OVERRIDE(OP): Performed by: SURGERY

## 2024-08-31 PROCEDURE — A9270 NON-COVERED ITEM OR SERVICE: HCPCS

## 2024-08-31 PROCEDURE — 80048 BASIC METABOLIC PNL TOTAL CA: CPT

## 2024-08-31 PROCEDURE — 700111 HCHG RX REV CODE 636 W/ 250 OVERRIDE (IP): Mod: JZ

## 2024-08-31 PROCEDURE — 85027 COMPLETE CBC AUTOMATED: CPT

## 2024-08-31 PROCEDURE — 700111 HCHG RX REV CODE 636 W/ 250 OVERRIDE (IP): Performed by: SURGERY

## 2024-08-31 PROCEDURE — 770001 HCHG ROOM/CARE - MED/SURG/GYN PRIV*

## 2024-08-31 PROCEDURE — A9270 NON-COVERED ITEM OR SERVICE: HCPCS | Performed by: SURGERY

## 2024-08-31 PROCEDURE — 82962 GLUCOSE BLOOD TEST: CPT

## 2024-08-31 PROCEDURE — A9270 NON-COVERED ITEM OR SERVICE: HCPCS | Mod: JZ | Performed by: SURGERY

## 2024-08-31 PROCEDURE — 700111 HCHG RX REV CODE 636 W/ 250 OVERRIDE (IP): Mod: JZ | Performed by: SURGERY

## 2024-08-31 PROCEDURE — 36415 COLL VENOUS BLD VENIPUNCTURE: CPT

## 2024-08-31 PROCEDURE — 700102 HCHG RX REV CODE 250 W/ 637 OVERRIDE(OP): Mod: JZ | Performed by: SURGERY

## 2024-08-31 PROCEDURE — 700105 HCHG RX REV CODE 258

## 2024-08-31 RX ORDER — MAGNESIUM SULFATE 1 G/100ML
1 INJECTION INTRAVENOUS ONCE
Status: COMPLETED | OUTPATIENT
Start: 2024-08-31 | End: 2024-08-31

## 2024-08-31 RX ORDER — POTASSIUM CHLORIDE 1500 MG/1
40 TABLET, EXTENDED RELEASE ORAL ONCE
Status: COMPLETED | OUTPATIENT
Start: 2024-08-31 | End: 2024-08-31

## 2024-08-31 RX ADMIN — ENOXAPARIN SODIUM 40 MG: 100 INJECTION SUBCUTANEOUS at 04:48

## 2024-08-31 RX ADMIN — ALLOPURINOL 300 MG: 300 TABLET ORAL at 04:49

## 2024-08-31 RX ADMIN — ACETAMINOPHEN 1000 MG: 500 TABLET ORAL at 04:49

## 2024-08-31 RX ADMIN — PIPERACILLIN AND TAZOBACTAM 4.5 G: 4; .5 INJECTION, POWDER, FOR SOLUTION INTRAVENOUS at 13:11

## 2024-08-31 RX ADMIN — ACETAMINOPHEN 1000 MG: 500 TABLET ORAL at 17:24

## 2024-08-31 RX ADMIN — ENOXAPARIN SODIUM 40 MG: 100 INJECTION SUBCUTANEOUS at 17:23

## 2024-08-31 RX ADMIN — PIPERACILLIN AND TAZOBACTAM 4.5 G: 4; .5 INJECTION, POWDER, FOR SOLUTION INTRAVENOUS at 20:48

## 2024-08-31 RX ADMIN — ACETAMINOPHEN 1000 MG: 500 TABLET ORAL at 23:38

## 2024-08-31 RX ADMIN — METOPROLOL SUCCINATE 200 MG: 100 TABLET, EXTENDED RELEASE ORAL at 04:48

## 2024-08-31 RX ADMIN — PRAVASTATIN SODIUM 80 MG: 20 TABLET ORAL at 17:23

## 2024-08-31 RX ADMIN — METFORMIN HYDROCHLORIDE 750 MG: 750 TABLET, EXTENDED RELEASE ORAL at 04:49

## 2024-08-31 RX ADMIN — TELMISARTAN 40 MG: 40 TABLET ORAL at 17:24

## 2024-08-31 RX ADMIN — CELECOXIB 200 MG: 200 CAPSULE ORAL at 17:23

## 2024-08-31 RX ADMIN — PIPERACILLIN AND TAZOBACTAM 4.5 G: 4; .5 INJECTION, POWDER, FOR SOLUTION INTRAVENOUS at 04:35

## 2024-08-31 RX ADMIN — ACETAMINOPHEN 1000 MG: 500 TABLET ORAL at 11:41

## 2024-08-31 RX ADMIN — MAGNESIUM SULFATE IN DEXTROSE 1 G: 10 INJECTION, SOLUTION INTRAVENOUS at 10:12

## 2024-08-31 RX ADMIN — INSULIN HUMAN 1 UNITS: 100 INJECTION, SOLUTION PARENTERAL at 17:26

## 2024-08-31 RX ADMIN — HYDROCHLOROTHIAZIDE 12.5 MG: 12.5 TABLET ORAL at 04:49

## 2024-08-31 RX ADMIN — AMLODIPINE BESYLATE 10 MG: 10 TABLET ORAL at 17:23

## 2024-08-31 RX ADMIN — CELECOXIB 200 MG: 200 CAPSULE ORAL at 04:49

## 2024-08-31 RX ADMIN — POTASSIUM CHLORIDE 40 MEQ: 1500 TABLET, EXTENDED RELEASE ORAL at 10:12

## 2024-08-31 ASSESSMENT — PAIN DESCRIPTION - PAIN TYPE
TYPE: ACUTE PAIN

## 2024-08-31 ASSESSMENT — FIBROSIS 4 INDEX: FIB4 SCORE: 1.13

## 2024-08-31 NOTE — PROGRESS NOTES
Assumed care of patient at 1900. Bedside report received. Assessment complete.    A&O x4. Patient calls appropriately.  Reports 2/10 pain. Declines pharmacological intervention at this time.  Denies SOB. O2 sats >90% on 1L.  Tolerating CHO diet. Denies N/V.  + void. - flatus. Last BM PTA.  x2 HUGH drains patent with sanguineous drainage  Skin per flowsheets  Mobility: SBA    Reviewed plan of care with patient. All needs met at this time. Call light and belongings within reach. Fall precautions in place. Hourly rounding in place.

## 2024-08-31 NOTE — PROGRESS NOTES
Assumed care of patient at 1845  Bedside Report Received     Pt AO x 4  Vitals signs stable  Pt denies chest pain or SOB  O2 sat >90% on RA breathing unlabored   Pt endorses 2/10 pain, declines PRN interventions at this time   Pt denies N/V/D  Pt is tolerating diabetic, diet  + voiding  + flatus, last BM this morning 8/31 (small brown and hard per pt), Bowel sounds present  MLI with staples, dressing changed, incision is CDI. Drain dressings changed as well, sites CDI no drainage noted   Pt ambulates standby   SCDs refused during the daytime, wanted a break     Plan of care discussed with pt. Safety education done. Falls precautions in place. Call light and belongings within reach. All needs met at this time.

## 2024-08-31 NOTE — CARE PLAN
The patient is Stable - Low risk of patient condition declining or worsening    Shift Goals  Clinical Goals: moNITOR DRAIN OUTPUT  Patient Goals: bowel function, rest    Progress made toward(s) clinical / shift goals:  Pt had 85ml out of drain #1 and 70ml out of drain #2 since 0700. Drain output continues to be bloody. I/O done per protocol. Drain dressing changed per protocol, previous dressing saturated but no active drainage from drain sites. Sutures in place, sites CDI.

## 2024-08-31 NOTE — PROGRESS NOTES
General Surgery Progress Note  DATE: 8/31/2024    Post Operative Day 3  left colectomy .    INTERVAL EVENTS:  Patient is awake and alert. Pain improving. Tolerating a diet. Drains still fairly sanguinous, 220 cc out. Had a bowel movement.     PHYSICAL EXAMINATION:  Vital Signs: /78   Pulse 60   Temp 36.4 °C (97.5 °F) (Temporal)   Resp 16   Wt (!) 151 kg (334 lb)   SpO2 93%     Awake and alert.    Breathing comfortably on room air.  Abdomen soft and appropriately tender.  Incisions are clean dry and intact.  2 drains with sanguinous output.    LABORATORY VALUES:  Recent Labs     08/29/24  0111 08/30/24  0432 08/31/24  0241   WBC 13.6* 12.2* 11.6*   RBC 5.22 4.38* 4.28*   HEMOGLOBIN 13.2* 11.2* 11.2*   HEMATOCRIT 43.5 35.9* 35.5*   MCV 83.3 82.0 82.9   MCH 25.3* 25.6* 26.2*   MCHC 30.3* 31.2* 31.5*   RDW 53.5* 54.3* 54.8*   PLATELETCT 294 226 228   MPV 9.6 9.8 9.6     Recent Labs     08/29/24  0111 08/30/24  0432 08/31/24  0241   SODIUM 136 138 139   POTASSIUM 4.8 3.9 3.5*   CHLORIDE 104 104 104   CO2 19* 25 24   GLUCOSE 153* 150* 146*   BUN 12 16 12   CREATININE 0.78 0.77 0.59   CALCIUM 8.7 8.7 8.8                IMAGING:  No orders to display       ASSESSMENT AND PLAN:  No new Assessment & Plan notes have been filed under this hospital service since the last note was generated.  Service: Surgery General    Postop day 3 status post colectomy.  Continue to monitor drain output.  Diet as tolerated.  Continue antibiotics for 4 days for peritoneal contamination.  Encourage ambulation. Replace magnesium and potassium.   Lovenox for DVT prophylaxis.       ____________________________________     Luba Lim M.D.    DD: 8/30/2024  4:32 PM

## 2024-08-31 NOTE — CARE PLAN
Problem: Pain - Standard  Goal: Alleviation of pain or a reduction in pain to the patient’s comfort goal  Outcome: Progressing     Problem: Knowledge Deficit - Standard  Goal: Patient and family/care givers will demonstrate understanding of plan of care, disease process/condition, diagnostic tests and medications  Outcome: Progressing     Problem: Mobility  Goal: Patient's capacity to carry out activities will improve  Outcome: Progressing   The patient is Stable - Low risk of patient condition declining or worsening    Shift Goals  Clinical Goals: Monitor drains, IV abx, pain control  Patient Goals: bowel function, rest    Progress made toward(s) clinical / shift goals: Patient reports minimal pain, managed per MAR with relief. x2 HUGH drains patent with sanguineous drainage. IV abx administered. Patient not passing flatus, no BM. Patient OOB up to bathroom.    Patient is not progressing towards the following goals:

## 2024-09-01 ENCOUNTER — PHARMACY VISIT (OUTPATIENT)
Dept: PHARMACY | Facility: MEDICAL CENTER | Age: 59
End: 2024-09-01
Payer: COMMERCIAL

## 2024-09-01 VITALS
HEART RATE: 65 BPM | TEMPERATURE: 98.1 F | OXYGEN SATURATION: 95 % | RESPIRATION RATE: 12 BRPM | SYSTOLIC BLOOD PRESSURE: 130 MMHG | WEIGHT: 315 LBS | DIASTOLIC BLOOD PRESSURE: 72 MMHG | BODY MASS INDEX: 46.58 KG/M2

## 2024-09-01 LAB
ANION GAP SERPL CALC-SCNC: 10 MMOL/L (ref 7–16)
BUN SERPL-MCNC: 6 MG/DL (ref 8–22)
CALCIUM SERPL-MCNC: 9 MG/DL (ref 8.5–10.5)
CHLORIDE SERPL-SCNC: 105 MMOL/L (ref 96–112)
CO2 SERPL-SCNC: 25 MMOL/L (ref 20–33)
CREAT SERPL-MCNC: 0.54 MG/DL (ref 0.5–1.4)
ERYTHROCYTE [DISTWIDTH] IN BLOOD BY AUTOMATED COUNT: 54.5 FL (ref 35.9–50)
GFR SERPLBLD CREATININE-BSD FMLA CKD-EPI: 114 ML/MIN/1.73 M 2
GLUCOSE BLD STRIP.AUTO-MCNC: 131 MG/DL (ref 65–99)
GLUCOSE BLD STRIP.AUTO-MCNC: 138 MG/DL (ref 65–99)
GLUCOSE SERPL-MCNC: 133 MG/DL (ref 65–99)
HCT VFR BLD AUTO: 35.7 % (ref 42–52)
HGB BLD-MCNC: 11.3 G/DL (ref 14–18)
MCH RBC QN AUTO: 26.1 PG (ref 27–33)
MCHC RBC AUTO-ENTMCNC: 31.7 G/DL (ref 32.3–36.5)
MCV RBC AUTO: 82.4 FL (ref 81.4–97.8)
PLATELET # BLD AUTO: 249 K/UL (ref 164–446)
PMV BLD AUTO: 9.6 FL (ref 9–12.9)
POTASSIUM SERPL-SCNC: 3.8 MMOL/L (ref 3.6–5.5)
RBC # BLD AUTO: 4.33 M/UL (ref 4.7–6.1)
SODIUM SERPL-SCNC: 140 MMOL/L (ref 135–145)
WBC # BLD AUTO: 8.3 K/UL (ref 4.8–10.8)

## 2024-09-01 PROCEDURE — 82962 GLUCOSE BLOOD TEST: CPT | Mod: 91

## 2024-09-01 PROCEDURE — 700102 HCHG RX REV CODE 250 W/ 637 OVERRIDE(OP)

## 2024-09-01 PROCEDURE — 85027 COMPLETE CBC AUTOMATED: CPT

## 2024-09-01 PROCEDURE — RXMED WILLOW AMBULATORY MEDICATION CHARGE: Performed by: SURGERY

## 2024-09-01 PROCEDURE — 36415 COLL VENOUS BLD VENIPUNCTURE: CPT

## 2024-09-01 PROCEDURE — 80048 BASIC METABOLIC PNL TOTAL CA: CPT

## 2024-09-01 PROCEDURE — A9270 NON-COVERED ITEM OR SERVICE: HCPCS | Performed by: SURGERY

## 2024-09-01 PROCEDURE — 700111 HCHG RX REV CODE 636 W/ 250 OVERRIDE (IP): Mod: JZ | Performed by: SURGERY

## 2024-09-01 PROCEDURE — A9270 NON-COVERED ITEM OR SERVICE: HCPCS

## 2024-09-01 PROCEDURE — RXMED WILLOW AMBULATORY MEDICATION CHARGE

## 2024-09-01 PROCEDURE — 700111 HCHG RX REV CODE 636 W/ 250 OVERRIDE (IP): Mod: JZ

## 2024-09-01 PROCEDURE — 700102 HCHG RX REV CODE 250 W/ 637 OVERRIDE(OP): Performed by: SURGERY

## 2024-09-01 PROCEDURE — 700105 HCHG RX REV CODE 258

## 2024-09-01 RX ORDER — ENOXAPARIN SODIUM 100 MG/ML
40 INJECTION SUBCUTANEOUS DAILY
Qty: 24 EACH | Refills: 0 | Status: ACTIVE | OUTPATIENT
Start: 2024-09-01 | End: 2024-09-25

## 2024-09-01 RX ORDER — OXYCODONE HYDROCHLORIDE 5 MG/1
5 TABLET ORAL EVERY 6 HOURS PRN
Qty: 20 TABLET | Refills: 0 | Status: SHIPPED | OUTPATIENT
Start: 2024-09-01 | End: 2024-09-11

## 2024-09-01 RX ADMIN — METOPROLOL SUCCINATE 200 MG: 100 TABLET, EXTENDED RELEASE ORAL at 04:55

## 2024-09-01 RX ADMIN — PIPERACILLIN AND TAZOBACTAM 4.5 G: 4; .5 INJECTION, POWDER, FOR SOLUTION INTRAVENOUS at 04:59

## 2024-09-01 RX ADMIN — ENOXAPARIN SODIUM 40 MG: 100 INJECTION SUBCUTANEOUS at 04:56

## 2024-09-01 RX ADMIN — METFORMIN HYDROCHLORIDE 750 MG: 750 TABLET, EXTENDED RELEASE ORAL at 04:55

## 2024-09-01 RX ADMIN — ACETAMINOPHEN 1000 MG: 500 TABLET ORAL at 04:55

## 2024-09-01 RX ADMIN — ACETAMINOPHEN 1000 MG: 500 TABLET ORAL at 11:16

## 2024-09-01 RX ADMIN — ALLOPURINOL 300 MG: 300 TABLET ORAL at 04:56

## 2024-09-01 RX ADMIN — HYDROCHLOROTHIAZIDE 12.5 MG: 12.5 TABLET ORAL at 04:55

## 2024-09-01 RX ADMIN — CELECOXIB 200 MG: 200 CAPSULE ORAL at 04:55

## 2024-09-01 RX ADMIN — PIPERACILLIN AND TAZOBACTAM 4.5 G: 4; .5 INJECTION, POWDER, FOR SOLUTION INTRAVENOUS at 11:19

## 2024-09-01 SDOH — ECONOMIC STABILITY: TRANSPORTATION INSECURITY
IN THE PAST 12 MONTHS, HAS THE LACK OF TRANSPORTATION KEPT YOU FROM MEDICAL APPOINTMENTS OR FROM GETTING MEDICATIONS?: NO

## 2024-09-01 SDOH — ECONOMIC STABILITY: TRANSPORTATION INSECURITY
IN THE PAST 12 MONTHS, HAS LACK OF RELIABLE TRANSPORTATION KEPT YOU FROM MEDICAL APPOINTMENTS, MEETINGS, WORK OR FROM GETTING THINGS NEEDED FOR DAILY LIVING?: NO

## 2024-09-01 ASSESSMENT — LIFESTYLE VARIABLES
TOTAL SCORE: 0
HAVE YOU EVER FELT YOU SHOULD CUT DOWN ON YOUR DRINKING: NO
DOES PATIENT WANT TO STOP DRINKING: NO
AVERAGE NUMBER OF DAYS PER WEEK YOU HAVE A DRINK CONTAINING ALCOHOL: 0
ON A TYPICAL DAY WHEN YOU DRINK ALCOHOL HOW MANY DRINKS DO YOU HAVE: 0
EVER FELT BAD OR GUILTY ABOUT YOUR DRINKING: NO
EVER HAD A DRINK FIRST THING IN THE MORNING TO STEADY YOUR NERVES TO GET RID OF A HANGOVER: NO
TOTAL SCORE: 0
HAVE PEOPLE ANNOYED YOU BY CRITICIZING YOUR DRINKING: NO
HOW MANY TIMES IN THE PAST YEAR HAVE YOU HAD 5 OR MORE DRINKS IN A DAY: 0
ALCOHOL_USE: NO
TOTAL SCORE: 0
CONSUMPTION TOTAL: NEGATIVE

## 2024-09-01 ASSESSMENT — PAIN DESCRIPTION - PAIN TYPE
TYPE: ACUTE PAIN
TYPE: ACUTE PAIN;SURGICAL PAIN
TYPE: ACUTE PAIN
TYPE: ACUTE PAIN;SURGICAL PAIN
TYPE: ACUTE PAIN
TYPE: ACUTE PAIN

## 2024-09-01 ASSESSMENT — SOCIAL DETERMINANTS OF HEALTH (SDOH)
IN THE PAST 12 MONTHS, HAS THE ELECTRIC, GAS, OIL, OR WATER COMPANY THREATENED TO SHUT OFF SERVICE IN YOUR HOME?: NO
WITHIN THE PAST 12 MONTHS, YOU WORRIED THAT YOUR FOOD WOULD RUN OUT BEFORE YOU GOT THE MONEY TO BUY MORE: NEVER TRUE
WITHIN THE PAST 12 MONTHS, THE FOOD YOU BOUGHT JUST DIDN'T LAST AND YOU DIDN'T HAVE MONEY TO GET MORE: NEVER TRUE

## 2024-09-01 NOTE — CARE PLAN
The patient is Stable - Low risk of patient condition declining or worsening    Shift Goals  Clinical Goals: Pain/nausea control, drain monitoring/teaching, IV ABX, possible discharge  Patient Goals: Discharge    Progress made toward(s) clinical / shift goals: Patient denying pain, stating back sore and abdomen 'bloated'. Drain education provided to patient/wife. Cups provided with instruction how to empty drains/take measurements. Patient tolerating last dose of IV ABX. Discussed discharge expectations with patient/wife     Patient is not progressing towards the following goals: NA.

## 2024-09-01 NOTE — PROGRESS NOTES
Assumed care of patient at 1900. Bedside report received. Assessment complete.    A&O x4. Patient calls appropriately.  Reports 2/10 pain. Pain managed with prescribed medications per MAR.  Denies SOB. O2 sats >90% on RA  Tolerating regular diet. Denies N/V.  + void. + flatus. Last BM 8/31.  x2 HUGH drains patent with sanguineous drainage  Skin per flowsheets  Mobility: up self    Reviewed plan of care with patient. All needs met at this time. Call light and belongings within reach. Fall precautions in place. Hourly rounding in place.

## 2024-09-01 NOTE — CARE PLAN
Problem: Pain - Standard  Goal: Alleviation of pain or a reduction in pain to the patient’s comfort goal  Outcome: Progressing     Problem: Knowledge Deficit - Standard  Goal: Patient and family/care givers will demonstrate understanding of plan of care, disease process/condition, diagnostic tests and medications  Outcome: Progressing     Problem: Bowel Elimination  Goal: Establish and maintain regular bowel function  Outcome: Progressing   The patient is Stable - Low risk of patient condition declining or worsening    Shift Goals  Clinical Goals: monitor drains, IV abx  Patient Goals: rest    Progress made toward(s) clinical / shift goals:  x2 HUGH drains patent with sanguineous output. IV abx administered. Pain managed with scheduled meds per MAR. Patient reports having BM during day, +flatus.    Patient is not progressing towards the following goals:

## 2024-09-01 NOTE — PROGRESS NOTES
Patient discharged home per MD orders. Patient ambulating on his own without assistance, on RA saturating >90%. Ex lap/colectomy discharge education provided, incisional care, follow up appointments, medication safety, patient demonstrated and verbalized understanding. All questions answered. Tolerating diet. Voiding without difficulty. IV removed. All patient belongings with patient at this time. Patient being escorted via WC with EMT-B with wife alongside to pharmacy. Wife has patients personal belongings. Patient educated to call MD or return to ED for concerns.

## 2024-09-01 NOTE — DISCHARGE SUMMARY
Discharge Summary    CHIEF COMPLAINT ON ADMISSION  No chief complaint on file.  Colon mass    Reason for Admission  Polyp of colon     Admission Date  8/28/2024    CODE STATUS  Full Code    HPI & HOSPITAL COURSE  This is a 59 y.o. male here with colon cancer. He had a colectomy on 8/28 with Dr Mayfield. He has recovered well. He has drains in place, which continue to have output. He is ambulatory, tolerating diet and pain is well controlled. He received four days of antibiotics. He will go home with lovenox.   No notes on file    Therefore, he is discharged in good and stable condition to home with close outpatient follow-up.    The patient met 2-midnight criteria for an inpatient stay at the time of discharge.    Discharge Date  09/01/24    FOLLOW UP ITEMS POST DISCHARGE  Follow up with oncology.   Follow up with Dr Mayfield, appointment made.   Drains out at follow up.     DISCHARGE DIAGNOSES  Principal Problem:    Polyp of colon (POA: Yes)  Resolved Problems:    * No resolved hospital problems. *      FOLLOW UP    Oncology and with Dr Mayfield    MEDICATIONS ON DISCHARGE     Medication List        ASK your doctor about these medications        Instructions   allopurinol 300 MG Tabs  Commonly known as: Zyloprim   TAKE 1 TABLET DAILY     aspirin 81 MG EC tablet   Take 81 mg by mouth every day.  Dose: 81 mg     CO Q 10 PO   Take 1 Tablet by mouth every day.  Dose: 1 Tablet     hydrochlorothiazide 12.5 MG capsule  Commonly known as: Microzide   TAKE 1 CAPSULE DAILY     Jardiance 25 MG Tabs  Generic drug: Empagliflozin   Take 1 Tablet by mouth every day.  Dose: 1 Tablet     KRILL OIL PO   Take 1 Tablet by mouth every day.  Dose: 1 Tablet     metFORMIN  MG Tb24  Commonly known as: Glucophage XR   Take 1 Tablet by mouth every day.  Dose: 750 mg     metoprolol 200 MG XL tablet  Commonly known as: Toprol XL   Take 1 Tablet by mouth every day. TAKE 1 TABLET DAILY FOR HIGH BLOOD PRESSURE  Dose: 200 mg     Mounjaro 2.5  MG/0.5ML Sopn  Generic drug: Tirzepatide   Inject 2.5 mg under the skin every 7 days. MONDAYS;  Dose: 2.5 mg     MULTIVITAMIN ADULT PO   Take 1 Tablet by mouth every day.  Dose: 1 Tablet     NON SPECIFIED   Take 1 Dose by mouth every day. Black garlic;  Dose: 1 Dose     pravastatin 80 MG tablet  Commonly known as: Pravachol   TAKE 1 TABLET DAILY     Telmisartan-amLODIPine 40-10 MG Tabs   Take 1 Tablet by mouth every day.  Dose: 1 Tablet     Vitamin D-3 125 MCG (5000 UT) Tabs   Take 1 Tablet by mouth every day.  Dose: 1 Tablet              Allergies  Allergies   Allergen Reactions    Rosuvastatin Calcium Unspecified     Other Reaction(s): Heart Palpitations       DIET  Orders Placed This Encounter   Procedures    Diet Order Diet: Consistent CHO (Diabetic)     Standing Status:   Standing     Number of Occurrences:   1     Order Specific Question:   ERAS     Answer:   Yes     Order Specific Question:   Diet:     Answer:   Consistent CHO (Diabetic) [4]       ACTIVITY  As tolerated.  10-lb lifting restriction    PROCEDURES  8/28 Left Colectomy     LABORATORY  Lab Results   Component Value Date    SODIUM 140 09/01/2024    POTASSIUM 3.8 09/01/2024    CHLORIDE 105 09/01/2024    CO2 25 09/01/2024    GLUCOSE 133 (H) 09/01/2024    BUN 6 (L) 09/01/2024    CREATININE 0.54 09/01/2024    CREATININE 1.0 03/26/2009        Lab Results   Component Value Date    WBC 8.3 09/01/2024    HEMOGLOBIN 11.3 (L) 09/01/2024    HEMATOCRIT 35.7 (L) 09/01/2024    PLATELETCT 249 09/01/2024        Total time of the discharge process exceeds 30 minutes.

## 2024-09-01 NOTE — DOCUMENTATION QUERY
"                                                                         [facility]                                                                       Query Response Note      PATIENT:               ISRAEL CALDERA  ACCT #:                  [id number]  MRN:                     [id number]  :                      1965  ADMIT DATE:       2024 8:32 AM  DISCH DATE:          RESPONDING  PROVIDER #:        [id number]           QUERY TEXT:    \"Tumor perforation is noted in the record.\"  Please clarify the location of the perforation:    The patient's clinical indicators include:  Clinical Findings:    - Surgery PN- Dr. Mayfield:  \"Continue IV antibiotics for 4 days for peritoneal contamination due to tumor perforation\"     OR report-   \"splenic flexure colon cancer.\"  \"1)  ROBOTIC ASSISTED converted to open left colectomy with splenic flexure mobilization  2) omentectomy\"  \" found evidence of perforation at the tumor site. I then converted to an open procedure\"    - WBC- 13.6    Risk factors: colon cancer, DM2, HTN, WILBERT, morbid obesity    Treatment:  OR - open left colectomy with splenic flexure mobilization; omentectomy; Zosyn IVPB; labs    Note: If you agree with a diagnosis listed above, please remember to include it in your concurrent daily documentation and onto the Discharge Summary.    Please contact me with any questions:    Yany MARCOS RN CCDS  [facility]  [email or web address]  Yany Tate via Voalte  Options provided:   -- Colon perforation   -- Tumor perforation, Please clarify site   -- Other explanation, (please specify the other explanation)      Query created by: Yany Tate on 2024 11:34 AM    RESPONSE TEXT:    Tumor perforation- at the site of the tumor          Electronically signed by:  CARMEN MAYFIELD MD 2024 9:52 PM              "

## 2024-09-01 NOTE — PROGRESS NOTES
"Bedside report received from [facility] RN; assumed care. Assessment complete. A&O x 4. VSS, intermittently bradycardic/HTN over night shift. 93% on 1.5L NC during NOC shift while sleeping. 92% on RA. Patient denying SOB, numbness, tingling, nausea, vomiting, dizziness, pain. Abdomen distended, round. MLI/lap stabs covered with gauze/tegaderm, CDI. X 2 HUGH to RLQ covered with split gauze/hypafix tape. Serosang drainage noted in bulbs. Patient reported voiding on own. -eructation. + flatus. LBM 0901, dark/'spinachy\" color reported by patient. Patient tolerating CHO diet, patient waiting for wife to arrive to heat up his In N Out burger. Patient up SBA/self with wide based steady gait noted. Discussed plan of care with patient. All questions answered.  Low fall risk. Bed in locked/lowest position.  Call light/personal belongings within reach.  All needs met, patient sitting EOB waiting for his wife on phone at present time.   "

## 2024-09-12 ENCOUNTER — PHARMACY VISIT (OUTPATIENT)
Dept: PHARMACY | Facility: MEDICAL CENTER | Age: 59
End: 2024-09-12
Payer: COMMERCIAL

## 2024-09-12 PROCEDURE — RXMED WILLOW AMBULATORY MEDICATION CHARGE

## 2024-09-26 ENCOUNTER — HOSPITAL ENCOUNTER (OUTPATIENT)
Facility: MEDICAL CENTER | Age: 59
End: 2024-09-26
Attending: INTERNAL MEDICINE
Payer: COMMERCIAL

## 2024-09-26 PROCEDURE — 81232 DPYD GENE COMMON VARIANTS: CPT

## 2024-09-26 PROCEDURE — 80074 ACUTE HEPATITIS PANEL: CPT

## 2024-09-27 LAB
HAV IGM SERPL QL IA: NORMAL
HBV CORE IGM SER QL: NORMAL
HBV SURFACE AG SER QL: NORMAL
HCV AB SER QL: NORMAL

## 2024-10-03 LAB — TEST NAME 95000: NORMAL

## 2024-10-11 ENCOUNTER — HOSPITAL ENCOUNTER (OUTPATIENT)
Facility: MEDICAL CENTER | Age: 59
End: 2024-10-11
Attending: NURSE PRACTITIONER
Payer: COMMERCIAL

## 2024-10-11 PROCEDURE — 83735 ASSAY OF MAGNESIUM: CPT

## 2024-10-11 PROCEDURE — 80053 COMPREHEN METABOLIC PANEL: CPT

## 2024-10-12 LAB
ALBUMIN SERPL BCP-MCNC: 4.5 G/DL (ref 3.2–4.9)
ALBUMIN/GLOB SERPL: 1.6 G/DL
ALP SERPL-CCNC: 106 U/L (ref 30–99)
ALT SERPL-CCNC: 22 U/L (ref 2–50)
ANION GAP SERPL CALC-SCNC: 13 MMOL/L (ref 7–16)
AST SERPL-CCNC: 23 U/L (ref 12–45)
BILIRUB SERPL-MCNC: 0.6 MG/DL (ref 0.1–1.5)
BUN SERPL-MCNC: 21 MG/DL (ref 8–22)
CALCIUM ALBUM COR SERPL-MCNC: 9.3 MG/DL (ref 8.5–10.5)
CALCIUM SERPL-MCNC: 9.7 MG/DL (ref 8.5–10.5)
CHLORIDE SERPL-SCNC: 102 MMOL/L (ref 96–112)
CO2 SERPL-SCNC: 25 MMOL/L (ref 20–33)
CREAT SERPL-MCNC: 0.76 MG/DL (ref 0.5–1.4)
GFR SERPLBLD CREATININE-BSD FMLA CKD-EPI: 103 ML/MIN/1.73 M 2
GLOBULIN SER CALC-MCNC: 2.8 G/DL (ref 1.9–3.5)
GLUCOSE SERPL-MCNC: 129 MG/DL (ref 65–99)
MAGNESIUM SERPL-MCNC: 2.1 MG/DL (ref 1.5–2.5)
POTASSIUM SERPL-SCNC: 4.6 MMOL/L (ref 3.6–5.5)
PROT SERPL-MCNC: 7.3 G/DL (ref 6–8.2)
SODIUM SERPL-SCNC: 140 MMOL/L (ref 135–145)

## 2024-10-19 ENCOUNTER — HOSPITAL ENCOUNTER (OUTPATIENT)
Dept: LAB | Facility: MEDICAL CENTER | Age: 59
End: 2024-10-19
Attending: INTERNAL MEDICINE
Payer: COMMERCIAL

## 2024-10-19 LAB
ALBUMIN SERPL BCP-MCNC: 4.3 G/DL (ref 3.2–4.9)
ALBUMIN/GLOB SERPL: 1.4 G/DL
ALP SERPL-CCNC: 114 U/L (ref 30–99)
ALT SERPL-CCNC: 27 U/L (ref 2–50)
ANION GAP SERPL CALC-SCNC: 10 MMOL/L (ref 7–16)
AST SERPL-CCNC: 28 U/L (ref 12–45)
BASOPHILS # BLD AUTO: 0.4 % (ref 0–1.8)
BASOPHILS # BLD: 0.03 K/UL (ref 0–0.12)
BILIRUB SERPL-MCNC: 0.4 MG/DL (ref 0.1–1.5)
BUN SERPL-MCNC: 11 MG/DL (ref 8–22)
CALCIUM ALBUM COR SERPL-MCNC: 9.5 MG/DL (ref 8.5–10.5)
CALCIUM SERPL-MCNC: 9.7 MG/DL (ref 8.5–10.5)
CHLORIDE SERPL-SCNC: 103 MMOL/L (ref 96–112)
CO2 SERPL-SCNC: 25 MMOL/L (ref 20–33)
CREAT SERPL-MCNC: 0.74 MG/DL (ref 0.5–1.4)
EOSINOPHIL # BLD AUTO: 0.15 K/UL (ref 0–0.51)
EOSINOPHIL NFR BLD: 2.2 % (ref 0–6.9)
ERYTHROCYTE [DISTWIDTH] IN BLOOD BY AUTOMATED COUNT: 52.6 FL (ref 35.9–50)
GFR SERPLBLD CREATININE-BSD FMLA CKD-EPI: 104 ML/MIN/1.73 M 2
GLOBULIN SER CALC-MCNC: 3.1 G/DL (ref 1.9–3.5)
GLUCOSE SERPL-MCNC: 127 MG/DL (ref 65–99)
HCT VFR BLD AUTO: 44.8 % (ref 42–52)
HGB BLD-MCNC: 14.1 G/DL (ref 14–18)
IMM GRANULOCYTES # BLD AUTO: 0.01 K/UL (ref 0–0.11)
IMM GRANULOCYTES NFR BLD AUTO: 0.1 % (ref 0–0.9)
LYMPHOCYTES # BLD AUTO: 2.72 K/UL (ref 1–4.8)
LYMPHOCYTES NFR BLD: 40.4 % (ref 22–41)
MAGNESIUM SERPL-MCNC: 2.2 MG/DL (ref 1.5–2.5)
MCH RBC QN AUTO: 27.1 PG (ref 27–33)
MCHC RBC AUTO-ENTMCNC: 31.5 G/DL (ref 32.3–36.5)
MCV RBC AUTO: 86 FL (ref 81.4–97.8)
MONOCYTES # BLD AUTO: 0.97 K/UL (ref 0–0.85)
MONOCYTES NFR BLD AUTO: 14.4 % (ref 0–13.4)
NEUTROPHILS # BLD AUTO: 2.85 K/UL (ref 1.82–7.42)
NEUTROPHILS NFR BLD: 42.5 % (ref 44–72)
NRBC # BLD AUTO: 0 K/UL
NRBC BLD-RTO: 0 /100 WBC (ref 0–0.2)
PLATELET # BLD AUTO: 316 K/UL (ref 164–446)
PMV BLD AUTO: 9.5 FL (ref 9–12.9)
POTASSIUM SERPL-SCNC: 5 MMOL/L (ref 3.6–5.5)
PROT SERPL-MCNC: 7.4 G/DL (ref 6–8.2)
RBC # BLD AUTO: 5.21 M/UL (ref 4.7–6.1)
SODIUM SERPL-SCNC: 138 MMOL/L (ref 135–145)
WBC # BLD AUTO: 6.7 K/UL (ref 4.8–10.8)

## 2024-10-19 PROCEDURE — 36415 COLL VENOUS BLD VENIPUNCTURE: CPT

## 2024-10-19 PROCEDURE — 83735 ASSAY OF MAGNESIUM: CPT

## 2024-10-19 PROCEDURE — 85025 COMPLETE CBC W/AUTO DIFF WBC: CPT

## 2024-10-19 PROCEDURE — 80053 COMPREHEN METABOLIC PANEL: CPT

## 2024-11-11 ENCOUNTER — HOSPITAL ENCOUNTER (OUTPATIENT)
Dept: LAB | Facility: MEDICAL CENTER | Age: 59
End: 2024-11-11
Attending: INTERNAL MEDICINE
Payer: COMMERCIAL

## 2024-11-11 LAB
ALBUMIN SERPL BCP-MCNC: 4.1 G/DL (ref 3.2–4.9)
ALBUMIN/GLOB SERPL: 1.3 G/DL
ALP SERPL-CCNC: 104 U/L (ref 30–99)
ALT SERPL-CCNC: 43 U/L (ref 2–50)
ANION GAP SERPL CALC-SCNC: 13 MMOL/L (ref 7–16)
ANISOCYTOSIS BLD QL SMEAR: ABNORMAL
AST SERPL-CCNC: 39 U/L (ref 12–45)
BASOPHILS # BLD AUTO: 1.7 % (ref 0–1.8)
BASOPHILS # BLD: 0.1 K/UL (ref 0–0.12)
BILIRUB SERPL-MCNC: 0.6 MG/DL (ref 0.1–1.5)
BUN SERPL-MCNC: 11 MG/DL (ref 8–22)
CALCIUM ALBUM COR SERPL-MCNC: 9.6 MG/DL (ref 8.5–10.5)
CALCIUM SERPL-MCNC: 9.7 MG/DL (ref 8.5–10.5)
CHLORIDE SERPL-SCNC: 103 MMOL/L (ref 96–112)
CO2 SERPL-SCNC: 23 MMOL/L (ref 20–33)
CREAT SERPL-MCNC: 0.63 MG/DL (ref 0.5–1.4)
EOSINOPHIL # BLD AUTO: 0.1 K/UL (ref 0–0.51)
EOSINOPHIL NFR BLD: 1.7 % (ref 0–6.9)
ERYTHROCYTE [DISTWIDTH] IN BLOOD BY AUTOMATED COUNT: 69.6 FL (ref 35.9–50)
GFR SERPLBLD CREATININE-BSD FMLA CKD-EPI: 109 ML/MIN/1.73 M 2
GLOBULIN SER CALC-MCNC: 3.2 G/DL (ref 1.9–3.5)
GLUCOSE SERPL-MCNC: 164 MG/DL (ref 65–99)
HCT VFR BLD AUTO: 44.7 % (ref 42–52)
HGB BLD-MCNC: 14.7 G/DL (ref 14–18)
LYMPHOCYTES # BLD AUTO: 2.17 K/UL (ref 1–4.8)
LYMPHOCYTES NFR BLD: 37.4 % (ref 22–41)
MACROCYTES BLD QL SMEAR: ABNORMAL
MAGNESIUM SERPL-MCNC: 1.9 MG/DL (ref 1.5–2.5)
MANUAL DIFF BLD: NORMAL
MCH RBC QN AUTO: 29.3 PG (ref 27–33)
MCHC RBC AUTO-ENTMCNC: 32.9 G/DL (ref 32.3–36.5)
MCV RBC AUTO: 89.2 FL (ref 81.4–97.8)
MICROCYTES BLD QL SMEAR: ABNORMAL
MONOCYTES # BLD AUTO: 0.71 K/UL (ref 0–0.85)
MONOCYTES NFR BLD AUTO: 12.2 % (ref 0–13.4)
MORPHOLOGY BLD-IMP: NORMAL
NEUTROPHILS # BLD AUTO: 2.73 K/UL (ref 1.82–7.42)
NEUTROPHILS NFR BLD: 47 % (ref 44–72)
NRBC # BLD AUTO: 0 K/UL
NRBC BLD-RTO: 0 /100 WBC (ref 0–0.2)
PLATELET # BLD AUTO: 295 K/UL (ref 164–446)
PLATELET BLD QL SMEAR: NORMAL
PMV BLD AUTO: 9.8 FL (ref 9–12.9)
POTASSIUM SERPL-SCNC: 3.6 MMOL/L (ref 3.6–5.5)
PROT SERPL-MCNC: 7.3 G/DL (ref 6–8.2)
RBC # BLD AUTO: 5.01 M/UL (ref 4.7–6.1)
RBC BLD AUTO: PRESENT
SODIUM SERPL-SCNC: 139 MMOL/L (ref 135–145)
WBC # BLD AUTO: 5.8 K/UL (ref 4.8–10.8)

## 2024-11-11 PROCEDURE — 85027 COMPLETE CBC AUTOMATED: CPT

## 2024-11-11 PROCEDURE — 36415 COLL VENOUS BLD VENIPUNCTURE: CPT

## 2024-11-11 PROCEDURE — 85007 BL SMEAR W/DIFF WBC COUNT: CPT

## 2024-11-11 PROCEDURE — 83735 ASSAY OF MAGNESIUM: CPT

## 2024-11-11 PROCEDURE — 80053 COMPREHEN METABOLIC PANEL: CPT

## 2024-12-02 ENCOUNTER — HOSPITAL ENCOUNTER (OUTPATIENT)
Dept: LAB | Facility: MEDICAL CENTER | Age: 59
End: 2024-12-02
Attending: INTERNAL MEDICINE
Payer: COMMERCIAL

## 2024-12-02 LAB
ALBUMIN SERPL BCP-MCNC: 4.2 G/DL (ref 3.2–4.9)
ALBUMIN/GLOB SERPL: 1.4 G/DL
ALP SERPL-CCNC: 100 U/L (ref 30–99)
ALT SERPL-CCNC: 34 U/L (ref 2–50)
ANION GAP SERPL CALC-SCNC: 11 MMOL/L (ref 7–16)
ANISOCYTOSIS BLD QL SMEAR: ABNORMAL
AST SERPL-CCNC: 37 U/L (ref 12–45)
BASOPHILS # BLD AUTO: 0.4 % (ref 0–1.8)
BASOPHILS # BLD: 0.02 K/UL (ref 0–0.12)
BILIRUB SERPL-MCNC: 0.7 MG/DL (ref 0.1–1.5)
BUN SERPL-MCNC: 13 MG/DL (ref 8–22)
CALCIUM ALBUM COR SERPL-MCNC: 9.2 MG/DL (ref 8.5–10.5)
CALCIUM SERPL-MCNC: 9.4 MG/DL (ref 8.5–10.5)
CHLORIDE SERPL-SCNC: 108 MMOL/L (ref 96–112)
CO2 SERPL-SCNC: 24 MMOL/L (ref 20–33)
COMMENT 1642: NORMAL
CREAT SERPL-MCNC: 0.87 MG/DL (ref 0.5–1.4)
EOSINOPHIL # BLD AUTO: 0.18 K/UL (ref 0–0.51)
EOSINOPHIL NFR BLD: 3.2 % (ref 0–6.9)
ERYTHROCYTE [DISTWIDTH] IN BLOOD BY AUTOMATED COUNT: 78.3 FL (ref 35.9–50)
GFR SERPLBLD CREATININE-BSD FMLA CKD-EPI: 99 ML/MIN/1.73 M 2
GLOBULIN SER CALC-MCNC: 3 G/DL (ref 1.9–3.5)
GLUCOSE SERPL-MCNC: 139 MG/DL (ref 65–99)
HCT VFR BLD AUTO: 47.9 % (ref 42–52)
HGB BLD-MCNC: 15.6 G/DL (ref 14–18)
IMM GRANULOCYTES # BLD AUTO: 0.02 K/UL (ref 0–0.11)
IMM GRANULOCYTES NFR BLD AUTO: 0.4 % (ref 0–0.9)
LYMPHOCYTES # BLD AUTO: 2.23 K/UL (ref 1–4.8)
LYMPHOCYTES NFR BLD: 39.4 % (ref 22–41)
MACROCYTES BLD QL SMEAR: ABNORMAL
MAGNESIUM SERPL-MCNC: 2 MG/DL (ref 1.5–2.5)
MCH RBC QN AUTO: 30.5 PG (ref 27–33)
MCHC RBC AUTO-ENTMCNC: 32.6 G/DL (ref 32.3–36.5)
MCV RBC AUTO: 93.7 FL (ref 81.4–97.8)
MICROCYTES BLD QL SMEAR: ABNORMAL
MONOCYTES # BLD AUTO: 1.09 K/UL (ref 0–0.85)
MONOCYTES NFR BLD AUTO: 19.3 % (ref 0–13.4)
MORPHOLOGY BLD-IMP: NORMAL
NEUTROPHILS # BLD AUTO: 2.12 K/UL (ref 1.82–7.42)
NEUTROPHILS NFR BLD: 37.3 % (ref 44–72)
NRBC # BLD AUTO: 0 K/UL
NRBC BLD-RTO: 0 /100 WBC (ref 0–0.2)
PLATELET # BLD AUTO: 255 K/UL (ref 164–446)
PLATELET BLD QL SMEAR: NORMAL
PMV BLD AUTO: 10.4 FL (ref 9–12.9)
POTASSIUM SERPL-SCNC: 5.1 MMOL/L (ref 3.6–5.5)
PROT SERPL-MCNC: 7.2 G/DL (ref 6–8.2)
RBC # BLD AUTO: 5.11 M/UL (ref 4.7–6.1)
RBC BLD AUTO: PRESENT
SODIUM SERPL-SCNC: 143 MMOL/L (ref 135–145)
WBC # BLD AUTO: 5.7 K/UL (ref 4.8–10.8)

## 2024-12-02 PROCEDURE — 83735 ASSAY OF MAGNESIUM: CPT

## 2024-12-02 PROCEDURE — 80053 COMPREHEN METABOLIC PANEL: CPT

## 2024-12-02 PROCEDURE — 36415 COLL VENOUS BLD VENIPUNCTURE: CPT

## 2024-12-02 PROCEDURE — 85025 COMPLETE CBC W/AUTO DIFF WBC: CPT

## 2024-12-16 ENCOUNTER — HOSPITAL ENCOUNTER (OUTPATIENT)
Dept: LAB | Facility: MEDICAL CENTER | Age: 59
End: 2024-12-16
Attending: INTERNAL MEDICINE
Payer: COMMERCIAL

## 2024-12-16 LAB
ALBUMIN SERPL BCP-MCNC: 4.4 G/DL (ref 3.2–4.9)
ALBUMIN/GLOB SERPL: 1.5 G/DL
ALP SERPL-CCNC: 113 U/L (ref 30–99)
ALT SERPL-CCNC: 31 U/L (ref 2–50)
ANION GAP SERPL CALC-SCNC: 10 MMOL/L (ref 7–16)
AST SERPL-CCNC: 33 U/L (ref 12–45)
BASOPHILS # BLD AUTO: 0.4 % (ref 0–1.8)
BASOPHILS # BLD: 0.03 K/UL (ref 0–0.12)
BILIRUB SERPL-MCNC: 0.4 MG/DL (ref 0.1–1.5)
BUN SERPL-MCNC: 16 MG/DL (ref 8–22)
CALCIUM ALBUM COR SERPL-MCNC: 9.1 MG/DL (ref 8.5–10.5)
CALCIUM SERPL-MCNC: 9.4 MG/DL (ref 8.5–10.5)
CHLORIDE SERPL-SCNC: 104 MMOL/L (ref 96–112)
CO2 SERPL-SCNC: 25 MMOL/L (ref 20–33)
COMMENT 1642: NORMAL
CREAT SERPL-MCNC: 0.65 MG/DL (ref 0.5–1.4)
EOSINOPHIL # BLD AUTO: 0.12 K/UL (ref 0–0.51)
EOSINOPHIL NFR BLD: 1.8 % (ref 0–6.9)
ERYTHROCYTE [DISTWIDTH] IN BLOOD BY AUTOMATED COUNT: 67.7 FL (ref 35.9–50)
GFR SERPLBLD CREATININE-BSD FMLA CKD-EPI: 108 ML/MIN/1.73 M 2
GLOBULIN SER CALC-MCNC: 3 G/DL (ref 1.9–3.5)
GLUCOSE SERPL-MCNC: 116 MG/DL (ref 65–99)
HCT VFR BLD AUTO: 46 % (ref 42–52)
HGB BLD-MCNC: 14.9 G/DL (ref 14–18)
IMM GRANULOCYTES # BLD AUTO: 0.02 K/UL (ref 0–0.11)
IMM GRANULOCYTES NFR BLD AUTO: 0.3 % (ref 0–0.9)
LYMPHOCYTES # BLD AUTO: 2.55 K/UL (ref 1–4.8)
LYMPHOCYTES NFR BLD: 37.2 % (ref 22–41)
MAGNESIUM SERPL-MCNC: 2.2 MG/DL (ref 1.5–2.5)
MCH RBC QN AUTO: 29.3 PG (ref 27–33)
MCHC RBC AUTO-ENTMCNC: 32.4 G/DL (ref 32.3–36.5)
MCV RBC AUTO: 90.4 FL (ref 81.4–97.8)
MONOCYTES # BLD AUTO: 0.9 K/UL (ref 0–0.85)
MONOCYTES NFR BLD AUTO: 13.1 % (ref 0–13.4)
MORPHOLOGY BLD-IMP: NORMAL
NEUTROPHILS # BLD AUTO: 3.23 K/UL (ref 1.82–7.42)
NEUTROPHILS NFR BLD: 47.2 % (ref 44–72)
NRBC # BLD AUTO: 0 K/UL
NRBC BLD-RTO: 0 /100 WBC (ref 0–0.2)
PLATELET # BLD AUTO: 270 K/UL (ref 164–446)
PLATELET BLD QL SMEAR: NORMAL
PMV BLD AUTO: 10.2 FL (ref 9–12.9)
POTASSIUM SERPL-SCNC: 3.8 MMOL/L (ref 3.6–5.5)
PROT SERPL-MCNC: 7.4 G/DL (ref 6–8.2)
RBC # BLD AUTO: 5.09 M/UL (ref 4.7–6.1)
RBC BLD AUTO: NORMAL
SODIUM SERPL-SCNC: 139 MMOL/L (ref 135–145)
WBC # BLD AUTO: 6.9 K/UL (ref 4.8–10.8)

## 2024-12-16 PROCEDURE — 83735 ASSAY OF MAGNESIUM: CPT

## 2024-12-16 PROCEDURE — 36415 COLL VENOUS BLD VENIPUNCTURE: CPT

## 2024-12-16 PROCEDURE — 85025 COMPLETE CBC W/AUTO DIFF WBC: CPT

## 2024-12-16 PROCEDURE — 80053 COMPREHEN METABOLIC PANEL: CPT

## 2025-01-06 ENCOUNTER — HOSPITAL ENCOUNTER (OUTPATIENT)
Dept: LAB | Facility: MEDICAL CENTER | Age: 60
End: 2025-01-06
Attending: INTERNAL MEDICINE
Payer: COMMERCIAL

## 2025-01-06 LAB
ALBUMIN SERPL BCP-MCNC: 4.4 G/DL (ref 3.2–4.9)
ALBUMIN/GLOB SERPL: 1.4 G/DL
ALP SERPL-CCNC: 122 U/L (ref 30–99)
ALT SERPL-CCNC: 37 U/L (ref 2–50)
ANION GAP SERPL CALC-SCNC: 13 MMOL/L (ref 7–16)
ANISOCYTOSIS BLD QL SMEAR: ABNORMAL
AST SERPL-CCNC: 38 U/L (ref 12–45)
BASOPHILS # BLD AUTO: 0.9 % (ref 0–1.8)
BASOPHILS # BLD: 0.06 K/UL (ref 0–0.12)
BILIRUB SERPL-MCNC: 0.7 MG/DL (ref 0.1–1.5)
BUN SERPL-MCNC: 14 MG/DL (ref 8–22)
CALCIUM ALBUM COR SERPL-MCNC: 9.5 MG/DL (ref 8.5–10.5)
CALCIUM SERPL-MCNC: 9.8 MG/DL (ref 8.5–10.5)
CHLORIDE SERPL-SCNC: 102 MMOL/L (ref 96–112)
CO2 SERPL-SCNC: 24 MMOL/L (ref 20–33)
COMMENT 1642: NORMAL
CREAT SERPL-MCNC: 0.76 MG/DL (ref 0.5–1.4)
EOSINOPHIL # BLD AUTO: 0.14 K/UL (ref 0–0.51)
EOSINOPHIL NFR BLD: 2.2 % (ref 0–6.9)
ERYTHROCYTE [DISTWIDTH] IN BLOOD BY AUTOMATED COUNT: 76 FL (ref 35.9–50)
GFR SERPLBLD CREATININE-BSD FMLA CKD-EPI: 103 ML/MIN/1.73 M 2
GLOBULIN SER CALC-MCNC: 3.2 G/DL (ref 1.9–3.5)
GLUCOSE SERPL-MCNC: 132 MG/DL (ref 65–99)
HCT VFR BLD AUTO: 50.5 % (ref 42–52)
HGB BLD-MCNC: 16.1 G/DL (ref 14–18)
IMM GRANULOCYTES # BLD AUTO: 0.01 K/UL (ref 0–0.11)
IMM GRANULOCYTES NFR BLD AUTO: 0.2 % (ref 0–0.9)
LYMPHOCYTES # BLD AUTO: 3.06 K/UL (ref 1–4.8)
LYMPHOCYTES NFR BLD: 47.2 % (ref 22–41)
MACROCYTES BLD QL SMEAR: ABNORMAL
MAGNESIUM SERPL-MCNC: 2.2 MG/DL (ref 1.5–2.5)
MCH RBC QN AUTO: 31.2 PG (ref 27–33)
MCHC RBC AUTO-ENTMCNC: 31.9 G/DL (ref 32.3–36.5)
MCV RBC AUTO: 97.9 FL (ref 81.4–97.8)
MONOCYTES # BLD AUTO: 1.01 K/UL (ref 0–0.85)
MONOCYTES NFR BLD AUTO: 15.6 % (ref 0–13.4)
MORPHOLOGY BLD-IMP: NORMAL
NEUTROPHILS # BLD AUTO: 2.2 K/UL (ref 1.82–7.42)
NEUTROPHILS NFR BLD: 33.9 % (ref 44–72)
NRBC # BLD AUTO: 0 K/UL
NRBC BLD-RTO: 0 /100 WBC (ref 0–0.2)
PLATELET # BLD AUTO: 286 K/UL (ref 164–446)
PLATELET BLD QL SMEAR: NORMAL
PMV BLD AUTO: 10.8 FL (ref 9–12.9)
POTASSIUM SERPL-SCNC: 3.9 MMOL/L (ref 3.6–5.5)
PROT SERPL-MCNC: 7.6 G/DL (ref 6–8.2)
RBC # BLD AUTO: 5.16 M/UL (ref 4.7–6.1)
RBC BLD AUTO: PRESENT
SODIUM SERPL-SCNC: 139 MMOL/L (ref 135–145)
WBC # BLD AUTO: 6.5 K/UL (ref 4.8–10.8)

## 2025-01-06 PROCEDURE — 80053 COMPREHEN METABOLIC PANEL: CPT

## 2025-01-06 PROCEDURE — 85025 COMPLETE CBC W/AUTO DIFF WBC: CPT

## 2025-01-06 PROCEDURE — 83735 ASSAY OF MAGNESIUM: CPT

## 2025-01-06 PROCEDURE — 36415 COLL VENOUS BLD VENIPUNCTURE: CPT

## 2025-02-14 ENCOUNTER — HOSPITAL ENCOUNTER (OUTPATIENT)
Dept: LAB | Facility: MEDICAL CENTER | Age: 60
End: 2025-02-14
Payer: COMMERCIAL

## 2025-02-14 LAB
ALBUMIN SERPL BCP-MCNC: 4.1 G/DL (ref 3.2–4.9)
ALBUMIN/GLOB SERPL: 0.8 G/DL
ALP SERPL-CCNC: 101 U/L (ref 30–99)
ALT SERPL-CCNC: 28 U/L (ref 2–50)
ANION GAP SERPL CALC-SCNC: 12 MMOL/L (ref 7–16)
AST SERPL-CCNC: 36 U/L (ref 12–45)
BASOPHILS # BLD AUTO: 0.7 % (ref 0–1.8)
BASOPHILS # BLD: 0.04 K/UL (ref 0–0.12)
BILIRUB SERPL-MCNC: 0.7 MG/DL (ref 0.1–1.5)
BUN SERPL-MCNC: 14 MG/DL (ref 8–22)
CALCIUM ALBUM COR SERPL-MCNC: 9.8 MG/DL (ref 8.5–10.5)
CALCIUM SERPL-MCNC: 9.9 MG/DL (ref 8.5–10.5)
CHLORIDE SERPL-SCNC: 102 MMOL/L (ref 96–112)
CO2 SERPL-SCNC: 24 MMOL/L (ref 20–33)
CREAT SERPL-MCNC: 0.85 MG/DL (ref 0.5–1.4)
EOSINOPHIL # BLD AUTO: 0.18 K/UL (ref 0–0.51)
EOSINOPHIL NFR BLD: 3 % (ref 0–6.9)
ERYTHROCYTE [DISTWIDTH] IN BLOOD BY AUTOMATED COUNT: 62.4 FL (ref 35.9–50)
GFR SERPLBLD CREATININE-BSD FMLA CKD-EPI: 99 ML/MIN/1.73 M 2
GLOBULIN SER CALC-MCNC: 5.3 G/DL (ref 1.9–3.5)
GLUCOSE SERPL-MCNC: 114 MG/DL (ref 65–99)
HCT VFR BLD AUTO: 46.9 % (ref 42–52)
HGB BLD-MCNC: 15.5 G/DL (ref 14–18)
IMM GRANULOCYTES # BLD AUTO: 0.01 K/UL (ref 0–0.11)
IMM GRANULOCYTES NFR BLD AUTO: 0.2 % (ref 0–0.9)
LYMPHOCYTES # BLD AUTO: 2.73 K/UL (ref 1–4.8)
LYMPHOCYTES NFR BLD: 45.8 % (ref 22–41)
MCH RBC QN AUTO: 32.4 PG (ref 27–33)
MCHC RBC AUTO-ENTMCNC: 33 G/DL (ref 32.3–36.5)
MCV RBC AUTO: 98.1 FL (ref 81.4–97.8)
MONOCYTES # BLD AUTO: 0.86 K/UL (ref 0–0.85)
MONOCYTES NFR BLD AUTO: 14.4 % (ref 0–13.4)
NEUTROPHILS # BLD AUTO: 2.14 K/UL (ref 1.82–7.42)
NEUTROPHILS NFR BLD: 35.9 % (ref 44–72)
NRBC # BLD AUTO: 0 K/UL
NRBC BLD-RTO: 0 /100 WBC (ref 0–0.2)
PLATELET # BLD AUTO: 252 K/UL (ref 164–446)
PMV BLD AUTO: 10.4 FL (ref 9–12.9)
POTASSIUM SERPL-SCNC: 3.6 MMOL/L (ref 3.6–5.5)
PROT SERPL-MCNC: 9.4 G/DL (ref 6–8.2)
RBC # BLD AUTO: 4.78 M/UL (ref 4.7–6.1)
SODIUM SERPL-SCNC: 138 MMOL/L (ref 135–145)
WBC # BLD AUTO: 6 K/UL (ref 4.8–10.8)

## 2025-02-14 PROCEDURE — 36415 COLL VENOUS BLD VENIPUNCTURE: CPT

## 2025-02-14 PROCEDURE — 85025 COMPLETE CBC W/AUTO DIFF WBC: CPT

## 2025-02-14 PROCEDURE — 80053 COMPREHEN METABOLIC PANEL: CPT

## 2025-02-28 ENCOUNTER — HOSPITAL ENCOUNTER (OUTPATIENT)
Facility: MEDICAL CENTER | Age: 60
End: 2025-02-28
Attending: INTERNAL MEDICINE
Payer: COMMERCIAL

## 2025-02-28 PROCEDURE — 83735 ASSAY OF MAGNESIUM: CPT

## 2025-02-28 PROCEDURE — 80053 COMPREHEN METABOLIC PANEL: CPT

## 2025-03-01 LAB
ALBUMIN SERPL BCP-MCNC: 4.4 G/DL (ref 3.2–4.9)
ALBUMIN/GLOB SERPL: 1 G/DL
ALP SERPL-CCNC: 109 U/L (ref 30–99)
ALT SERPL-CCNC: 43 U/L (ref 2–50)
ANION GAP SERPL CALC-SCNC: 13 MMOL/L (ref 7–16)
AST SERPL-CCNC: 40 U/L (ref 12–45)
BILIRUB SERPL-MCNC: 0.5 MG/DL (ref 0.1–1.5)
BUN SERPL-MCNC: 13 MG/DL (ref 8–22)
CALCIUM ALBUM COR SERPL-MCNC: 9.4 MG/DL (ref 8.5–10.5)
CALCIUM SERPL-MCNC: 9.7 MG/DL (ref 8.5–10.5)
CHLORIDE SERPL-SCNC: 101 MMOL/L (ref 96–112)
CO2 SERPL-SCNC: 24 MMOL/L (ref 20–33)
CREAT SERPL-MCNC: 0.8 MG/DL (ref 0.5–1.4)
GFR SERPLBLD CREATININE-BSD FMLA CKD-EPI: 101 ML/MIN/1.73 M 2
GLOBULIN SER CALC-MCNC: 4.2 G/DL (ref 1.9–3.5)
GLUCOSE SERPL-MCNC: 102 MG/DL (ref 65–99)
MAGNESIUM SERPL-MCNC: 2.2 MG/DL (ref 1.5–2.5)
POTASSIUM SERPL-SCNC: 4.1 MMOL/L (ref 3.6–5.5)
PROT SERPL-MCNC: 8.6 G/DL (ref 6–8.2)
SODIUM SERPL-SCNC: 138 MMOL/L (ref 135–145)

## 2025-03-24 ENCOUNTER — HOSPITAL ENCOUNTER (OUTPATIENT)
Dept: LAB | Facility: MEDICAL CENTER | Age: 60
End: 2025-03-24
Attending: INTERNAL MEDICINE
Payer: COMMERCIAL

## 2025-03-24 LAB
ALBUMIN SERPL BCP-MCNC: 4.2 G/DL (ref 3.2–4.9)
ALBUMIN/GLOB SERPL: 1.2 G/DL
ALP SERPL-CCNC: 103 U/L (ref 30–99)
ALT SERPL-CCNC: 85 U/L (ref 2–50)
ANION GAP SERPL CALC-SCNC: 12 MMOL/L (ref 7–16)
AST SERPL-CCNC: 57 U/L (ref 12–45)
BASOPHILS # BLD AUTO: 0.4 % (ref 0–1.8)
BASOPHILS # BLD: 0.03 K/UL (ref 0–0.12)
BILIRUB SERPL-MCNC: 0.4 MG/DL (ref 0.1–1.5)
BUN SERPL-MCNC: 19 MG/DL (ref 8–22)
CALCIUM ALBUM COR SERPL-MCNC: 9.7 MG/DL (ref 8.5–10.5)
CALCIUM SERPL-MCNC: 9.9 MG/DL (ref 8.5–10.5)
CHLORIDE SERPL-SCNC: 102 MMOL/L (ref 96–112)
CO2 SERPL-SCNC: 23 MMOL/L (ref 20–33)
CREAT SERPL-MCNC: 0.96 MG/DL (ref 0.5–1.4)
EOSINOPHIL # BLD AUTO: 0.12 K/UL (ref 0–0.51)
EOSINOPHIL NFR BLD: 1.5 % (ref 0–6.9)
ERYTHROCYTE [DISTWIDTH] IN BLOOD BY AUTOMATED COUNT: 59.8 FL (ref 35.9–50)
GFR SERPLBLD CREATININE-BSD FMLA CKD-EPI: 90 ML/MIN/1.73 M 2
GLOBULIN SER CALC-MCNC: 3.4 G/DL (ref 1.9–3.5)
GLUCOSE SERPL-MCNC: 168 MG/DL (ref 65–99)
HCT VFR BLD AUTO: 48.7 % (ref 42–52)
HGB BLD-MCNC: 16 G/DL (ref 14–18)
IMM GRANULOCYTES # BLD AUTO: 0.02 K/UL (ref 0–0.11)
IMM GRANULOCYTES NFR BLD AUTO: 0.3 % (ref 0–0.9)
LYMPHOCYTES # BLD AUTO: 2.67 K/UL (ref 1–4.8)
LYMPHOCYTES NFR BLD: 34.1 % (ref 22–41)
MAGNESIUM SERPL-MCNC: 1.9 MG/DL (ref 1.5–2.5)
MCH RBC QN AUTO: 32.9 PG (ref 27–33)
MCHC RBC AUTO-ENTMCNC: 32.9 G/DL (ref 32.3–36.5)
MCV RBC AUTO: 100 FL (ref 81.4–97.8)
MONOCYTES # BLD AUTO: 1.04 K/UL (ref 0–0.85)
MONOCYTES NFR BLD AUTO: 13.3 % (ref 0–13.4)
NEUTROPHILS # BLD AUTO: 3.95 K/UL (ref 1.82–7.42)
NEUTROPHILS NFR BLD: 50.4 % (ref 44–72)
NRBC # BLD AUTO: 0 K/UL
NRBC BLD-RTO: 0 /100 WBC (ref 0–0.2)
PLATELET # BLD AUTO: 217 K/UL (ref 164–446)
PMV BLD AUTO: 10.8 FL (ref 9–12.9)
POTASSIUM SERPL-SCNC: 4.8 MMOL/L (ref 3.6–5.5)
PROT SERPL-MCNC: 7.6 G/DL (ref 6–8.2)
RBC # BLD AUTO: 4.87 M/UL (ref 4.7–6.1)
SODIUM SERPL-SCNC: 137 MMOL/L (ref 135–145)
WBC # BLD AUTO: 7.8 K/UL (ref 4.8–10.8)

## 2025-03-24 PROCEDURE — 85025 COMPLETE CBC W/AUTO DIFF WBC: CPT

## 2025-03-24 PROCEDURE — 83735 ASSAY OF MAGNESIUM: CPT

## 2025-03-24 PROCEDURE — 36415 COLL VENOUS BLD VENIPUNCTURE: CPT

## 2025-03-24 PROCEDURE — 80053 COMPREHEN METABOLIC PANEL: CPT

## 2025-04-15 ENCOUNTER — HOSPITAL ENCOUNTER (OUTPATIENT)
Dept: LAB | Facility: MEDICAL CENTER | Age: 60
End: 2025-04-15
Attending: INTERNAL MEDICINE
Payer: COMMERCIAL

## 2025-04-15 LAB
ALBUMIN SERPL BCP-MCNC: 4.2 G/DL (ref 3.2–4.9)
ALBUMIN/GLOB SERPL: 1.1 G/DL
ALP SERPL-CCNC: 123 U/L (ref 30–99)
ALT SERPL-CCNC: 52 U/L (ref 2–50)
ANION GAP SERPL CALC-SCNC: 14 MMOL/L (ref 7–16)
AST SERPL-CCNC: 57 U/L (ref 12–45)
BASOPHILS # BLD AUTO: 0.3 % (ref 0–1.8)
BASOPHILS # BLD: 0.02 K/UL (ref 0–0.12)
BILIRUB SERPL-MCNC: 0.5 MG/DL (ref 0.1–1.5)
BUN SERPL-MCNC: 10 MG/DL (ref 8–22)
CALCIUM ALBUM COR SERPL-MCNC: 9.9 MG/DL (ref 8.5–10.5)
CALCIUM SERPL-MCNC: 10.1 MG/DL (ref 8.5–10.5)
CHLORIDE SERPL-SCNC: 104 MMOL/L (ref 96–112)
CO2 SERPL-SCNC: 23 MMOL/L (ref 20–33)
CREAT SERPL-MCNC: 0.62 MG/DL (ref 0.5–1.4)
EOSINOPHIL # BLD AUTO: 0.06 K/UL (ref 0–0.51)
EOSINOPHIL NFR BLD: 0.9 % (ref 0–6.9)
ERYTHROCYTE [DISTWIDTH] IN BLOOD BY AUTOMATED COUNT: 64.8 FL (ref 35.9–50)
GFR SERPLBLD CREATININE-BSD FMLA CKD-EPI: 109 ML/MIN/1.73 M 2
GLOBULIN SER CALC-MCNC: 4 G/DL (ref 1.9–3.5)
GLUCOSE SERPL-MCNC: 150 MG/DL (ref 65–99)
HCT VFR BLD AUTO: 51.7 % (ref 42–52)
HGB BLD-MCNC: 16.7 G/DL (ref 14–18)
IMM GRANULOCYTES # BLD AUTO: 0.01 K/UL (ref 0–0.11)
IMM GRANULOCYTES NFR BLD AUTO: 0.2 % (ref 0–0.9)
LYMPHOCYTES # BLD AUTO: 2.37 K/UL (ref 1–4.8)
LYMPHOCYTES NFR BLD: 36.1 % (ref 22–41)
MAGNESIUM SERPL-MCNC: 2.1 MG/DL (ref 1.5–2.5)
MCH RBC QN AUTO: 32.7 PG (ref 27–33)
MCHC RBC AUTO-ENTMCNC: 32.3 G/DL (ref 32.3–36.5)
MCV RBC AUTO: 101.4 FL (ref 81.4–97.8)
MONOCYTES # BLD AUTO: 1.01 K/UL (ref 0–0.85)
MONOCYTES NFR BLD AUTO: 15.4 % (ref 0–13.4)
NEUTROPHILS # BLD AUTO: 3.1 K/UL (ref 1.82–7.42)
NEUTROPHILS NFR BLD: 47.1 % (ref 44–72)
NRBC # BLD AUTO: 0 K/UL
NRBC BLD-RTO: 0 /100 WBC (ref 0–0.2)
PLATELET # BLD AUTO: 267 K/UL (ref 164–446)
PMV BLD AUTO: 10.4 FL (ref 9–12.9)
POTASSIUM SERPL-SCNC: 4.8 MMOL/L (ref 3.6–5.5)
PROT SERPL-MCNC: 8.2 G/DL (ref 6–8.2)
RBC # BLD AUTO: 5.1 M/UL (ref 4.7–6.1)
SODIUM SERPL-SCNC: 141 MMOL/L (ref 135–145)
WBC # BLD AUTO: 6.6 K/UL (ref 4.8–10.8)

## 2025-04-15 PROCEDURE — 36415 COLL VENOUS BLD VENIPUNCTURE: CPT

## 2025-04-15 PROCEDURE — 80053 COMPREHEN METABOLIC PANEL: CPT

## 2025-04-15 PROCEDURE — 85025 COMPLETE CBC W/AUTO DIFF WBC: CPT

## 2025-04-15 PROCEDURE — 83735 ASSAY OF MAGNESIUM: CPT

## 2025-05-11 ENCOUNTER — OFFICE VISIT (OUTPATIENT)
Dept: URGENT CARE | Facility: PHYSICIAN GROUP | Age: 60
End: 2025-05-11
Payer: COMMERCIAL

## 2025-05-11 VITALS
BODY MASS INDEX: 36.23 KG/M2 | WEIGHT: 273.37 LBS | SYSTOLIC BLOOD PRESSURE: 128 MMHG | HEART RATE: 71 BPM | OXYGEN SATURATION: 92 % | HEIGHT: 73 IN | TEMPERATURE: 98.4 F | RESPIRATION RATE: 14 BRPM | DIASTOLIC BLOOD PRESSURE: 70 MMHG

## 2025-05-11 DIAGNOSIS — J98.8 RESPIRATORY INFECTION: ICD-10-CM

## 2025-05-11 DIAGNOSIS — Z91.89: ICD-10-CM

## 2025-05-11 PROCEDURE — 3078F DIAST BP <80 MM HG: CPT

## 2025-05-11 PROCEDURE — 3074F SYST BP LT 130 MM HG: CPT

## 2025-05-11 PROCEDURE — 99213 OFFICE O/P EST LOW 20 MIN: CPT

## 2025-05-11 RX ORDER — DOXYCYCLINE 100 MG/1
100 CAPSULE ORAL 2 TIMES DAILY
Qty: 10 CAPSULE | Refills: 0 | Status: SHIPPED | OUTPATIENT
Start: 2025-05-11 | End: 2025-05-16

## 2025-05-11 RX ORDER — ASPIRIN 81 MG/1
81 TABLET ORAL DAILY
COMMUNITY

## 2025-05-11 ASSESSMENT — ENCOUNTER SYMPTOMS
COUGH: 1
WHEEZING: 0

## 2025-05-11 ASSESSMENT — FIBROSIS 4 INDEX: FIB4 SCORE: 1.78

## 2025-05-12 NOTE — PROGRESS NOTES
"Subjective     Moe Car is a 59 y.o. male who presents with Cough (X 5 days, some mucus and congetion)    Onset approx 6 days ago.  Patient reports mildly productive cough.  No hemoptysis.  No shortness of breath or chest pain. Had fevers 2 days ago. No nasal congestion, sinus pressure or pain. Denies history of asthma, COPD, pneumonia. Prior tx honey and lemon. He has had poor appetite. Tolerating fluids. No other aggravating or alleviating factors. PMH  malignant tumor of colon. He reports he finished chemotherapy last week.         Review of Systems   Constitutional:  Positive for malaise/fatigue.   Respiratory:  Positive for cough. Negative for wheezing.    All other systems reviewed and are negative.    Medications:    allopurinol Tabs  aspirin  CO Q 10 PO  hydrochlorothiazide  Jardiance Tabs  KRILL OIL PO  metFORMIN ER Tb24  metoprolol  MULTIVITAMIN ADULT PO  NON SPECIFIED  pravastatin  Telmisartan-amLODIPine Tabs  Vitamin D-3 Tabs    Allergies:  Rosuvastatin calcium    Past Social Hx:  Moe Car  reports that he has never smoked. His smokeless tobacco use includes chew. He reports that he does not currently use alcohol after a past usage of about 1.5 oz of alcohol per week. He reports that he does not use drugs.    Past Medical Hx:   Past Medical History:   Diagnosis Date    Diabetes (HCC) 07924117?    Jardiance and Metformin A1c 5.2    High cholesterol 55343735    Pravastatine and diet    Hyperlipidemia 10/21/2009    Hypertension 10/21/2009    Under control w medication    Obstructive sleep apnea 12/02/2009    Pain [id number]    Torn Rotator Cuf    PONV (postoperative nausea and vomiting)                  Objective     /70 (BP Location: Right arm, Patient Position: Sitting, BP Cuff Size: Large adult)   Pulse 71   Temp 36.9 °C (98.4 °F)   Resp 14   Ht 1.854 m (6' 1\")   Wt 124 kg (273 lb 5.9 oz)   SpO2 92%   BMI 36.07 kg/m²      Physical Exam  Vitals reviewed.   Constitutional:  "      Appearance: Normal appearance. He is not toxic-appearing.   HENT:      Head: Normocephalic and atraumatic.      Right Ear: External ear normal.      Left Ear: External ear normal.      Nose: Nose normal.      Mouth/Throat:      Mouth: Mucous membranes are moist.      Pharynx: Uvula midline. No oropharyngeal exudate or posterior oropharyngeal erythema.      Tonsils: No tonsillar exudate or tonsillar abscesses.   Eyes:      Conjunctiva/sclera: Conjunctivae normal.   Cardiovascular:      Rate and Rhythm: Normal rate.      Heart sounds: Normal heart sounds.   Pulmonary:      Breath sounds: Rhonchi present.   Abdominal:      General: Abdomen is flat. Bowel sounds are normal.      Palpations: Abdomen is soft.   Musculoskeletal:      Cervical back: Normal range of motion and neck supple.   Skin:     General: Skin is warm and dry.      Capillary Refill: Capillary refill takes less than 2 seconds.   Neurological:      Mental Status: He is alert and oriented to person, place, and time.   Psychiatric:         Behavior: Behavior normal.                                  Assessment & Plan  Respiratory infection    Orders:    doxycycline (MONODOX) 100 MG capsule; Take 1 Capsule by mouth 2 times a day for 5 days.    At risk for respiratory infection    Orders:    doxycycline (MONODOX) 100 MG capsule; Take 1 Capsule by mouth 2 times a day for 5 days.    Patient remained afebrile during this visit.  However he does report new onset fevers and no improvement of symptoms approximately 6 days ago.  Past medical history does include immunocompromise/colon cancer.  Physical exam today reveals rhonchi.  Unfortunately we do not have chest x-ray available this evening.  Through shared decision, we will err on the side of caution and prescribe antimicrobials.  Strict ED precautions advised.    Differential diagnosis, natural history, supportive care, and indications for immediate follow-up discussed. Follow-up as needed with PCP or RTC  for recheck, reevaluation, and consideration of further management. Recommend Emergency Department or call 911 if symptoms fail to improve, for any change in condition, further concerns, or new concerning symptoms.     Discussed management options (risks, benefits, and alternatives to treatment). Pt/parent/guardian demonstrates understanding and the treatment plan was agreed upon.     Electronically signed by   Alvina Hernandez DNP, FORTUNATO, MARGIE

## 2025-06-06 ENCOUNTER — HOSPITAL ENCOUNTER (OUTPATIENT)
Dept: LAB | Facility: MEDICAL CENTER | Age: 60
End: 2025-06-06
Payer: COMMERCIAL

## 2025-06-06 LAB
ALBUMIN SERPL BCP-MCNC: 4.3 G/DL (ref 3.2–4.9)
ALBUMIN/GLOB SERPL: 0.9 G/DL
ALP SERPL-CCNC: 112 U/L (ref 30–99)
ALT SERPL-CCNC: 34 U/L (ref 2–50)
ANION GAP SERPL CALC-SCNC: 15 MMOL/L (ref 7–16)
AST SERPL-CCNC: 40 U/L (ref 12–45)
BASOPHILS # BLD AUTO: 0.3 % (ref 0–1.8)
BASOPHILS # BLD: 0.02 K/UL (ref 0–0.12)
BILIRUB SERPL-MCNC: 0.9 MG/DL (ref 0.1–1.5)
BUN SERPL-MCNC: 11 MG/DL (ref 8–22)
CALCIUM ALBUM COR SERPL-MCNC: 9.5 MG/DL (ref 8.5–10.5)
CALCIUM SERPL-MCNC: 9.7 MG/DL (ref 8.5–10.5)
CHLORIDE SERPL-SCNC: 104 MMOL/L (ref 96–112)
CHOLEST SERPL-MCNC: 171 MG/DL (ref 100–199)
CO2 SERPL-SCNC: 19 MMOL/L (ref 20–33)
CREAT SERPL-MCNC: 0.67 MG/DL (ref 0.5–1.4)
EOSINOPHIL # BLD AUTO: 0.25 K/UL (ref 0–0.51)
EOSINOPHIL NFR BLD: 3.6 % (ref 0–6.9)
ERYTHROCYTE [DISTWIDTH] IN BLOOD BY AUTOMATED COUNT: 53.3 FL (ref 35.9–50)
EST. AVERAGE GLUCOSE BLD GHB EST-MCNC: 123 MG/DL
FASTING STATUS PATIENT QL REPORTED: NORMAL
GFR SERPLBLD CREATININE-BSD FMLA CKD-EPI: 106 ML/MIN/1.73 M 2
GLOBULIN SER CALC-MCNC: 4.6 G/DL (ref 1.9–3.5)
GLUCOSE SERPL-MCNC: 111 MG/DL (ref 65–99)
HBA1C MFR BLD: 5.9 % (ref 4–5.6)
HCT VFR BLD AUTO: 48.7 % (ref 42–52)
HDLC SERPL-MCNC: 51 MG/DL
HGB BLD-MCNC: 17.5 G/DL (ref 14–18)
IMM GRANULOCYTES # BLD AUTO: 0.02 K/UL (ref 0–0.11)
IMM GRANULOCYTES NFR BLD AUTO: 0.3 % (ref 0–0.9)
LDLC SERPL CALC-MCNC: 93 MG/DL
LYMPHOCYTES # BLD AUTO: 2.6 K/UL (ref 1–4.8)
LYMPHOCYTES NFR BLD: 37.1 % (ref 22–41)
MCH RBC QN AUTO: 34.6 PG (ref 27–33)
MCHC RBC AUTO-ENTMCNC: 35.9 G/DL (ref 32.3–36.5)
MCV RBC AUTO: 96.2 FL (ref 81.4–97.8)
MONOCYTES # BLD AUTO: 0.72 K/UL (ref 0–0.85)
MONOCYTES NFR BLD AUTO: 10.3 % (ref 0–13.4)
NEUTROPHILS # BLD AUTO: 3.4 K/UL (ref 1.82–7.42)
NEUTROPHILS NFR BLD: 48.4 % (ref 44–72)
NRBC # BLD AUTO: 0 K/UL
NRBC BLD-RTO: 0 /100 WBC (ref 0–0.2)
PLATELET # BLD AUTO: 277 K/UL (ref 164–446)
PMV BLD AUTO: 10 FL (ref 9–12.9)
POTASSIUM SERPL-SCNC: 4.1 MMOL/L (ref 3.6–5.5)
PROT SERPL-MCNC: 8.9 G/DL (ref 6–8.2)
PSA SERPL DL<=0.01 NG/ML-MCNC: 1.45 NG/ML (ref 0–4)
RBC # BLD AUTO: 5.06 M/UL (ref 4.7–6.1)
SODIUM SERPL-SCNC: 138 MMOL/L (ref 135–145)
TRIGL SERPL-MCNC: 137 MG/DL (ref 0–149)
WBC # BLD AUTO: 7 K/UL (ref 4.8–10.8)

## 2025-06-06 PROCEDURE — 80061 LIPID PANEL: CPT

## 2025-06-06 PROCEDURE — 85025 COMPLETE CBC W/AUTO DIFF WBC: CPT

## 2025-06-06 PROCEDURE — 36415 COLL VENOUS BLD VENIPUNCTURE: CPT

## 2025-06-06 PROCEDURE — 84153 ASSAY OF PSA TOTAL: CPT

## 2025-06-06 PROCEDURE — 83036 HEMOGLOBIN GLYCOSYLATED A1C: CPT

## 2025-06-06 PROCEDURE — 80053 COMPREHEN METABOLIC PANEL: CPT

## (undated) DEVICE — SENSOR OXIMETER ADULT SPO2 RD SET (20EA/BX)

## (undated) DEVICE — OBTURATOR BLADELESS STANDARD 8MM (6EA/BX)

## (undated) DEVICE — STAPLE 55MM LINEAR BLUE 3.8MM (12EA/BX)

## (undated) DEVICE — GLOVE BIOGEL INDICATOR SZ 7SURGICAL PF LTX - (50/BX 4BX/CA)

## (undated) DEVICE — GLOVE BIOGEL SZ 8 SURGICAL PF LTX - (50PR/BX 4BX/CA)

## (undated) DEVICE — SUTURE GENERAL

## (undated) DEVICE — SUCTION INSTRUMENT YANKAUER BULBOUS TIP W/O VENT (50EA/CA)

## (undated) DEVICE — RELOAD STAPLER SUREFORM 60 WHITE (12EA/BX)

## (undated) DEVICE — GOWN WARMING STANDARD FLEX - (30/CA)

## (undated) DEVICE — CANISTER SUCTION 3000ML MECHANICAL FILTER AUTO SHUTOFF MEDI-VAC NONSTERILE LF DISP (40EA/CA)

## (undated) DEVICE — STAPLER 75MM LINEAR OPEN (3EA/BX)

## (undated) DEVICE — GLOVE BIOGEL INDICATOR SZ 8 SURGICAL PF LTX - (50/BX 4BX/CA)

## (undated) DEVICE — Device

## (undated) DEVICE — GLOVE BIOGEL INDICATOR SZ 7.5 SURGICAL PF LTX - (50PR/BX 4BX/CA)

## (undated) DEVICE — SUTURE 4-0 MONOCRYL PLUS PS-2 - 27 INCH (36/BX)

## (undated) DEVICE — SODIUM CHL. IRRIGATION 0.9% 3000ML (4EA/CA 65CA/PF)

## (undated) DEVICE — SYSTEM CLEARIFY VISUALIZATION (10EA/PK)

## (undated) DEVICE — SUTURE NABSB 2-0 KS 30IN PRLN BLUE (36PK/BX)

## (undated) DEVICE — SYRINGE 30 ML LL (56/BX)

## (undated) DEVICE — GLOVE SZ 6.5 BIOGEL PI MICRO - PF LF (50PR/BX)

## (undated) DEVICE — TUBING CLEARLINK DUO-VENT - C-FLO (48EA/CA)

## (undated) DEVICE — TUBE NG SALEM SUMP 16FR (50EA/CA)

## (undated) DEVICE — SHAVER 5.5 RESECTOR FORMULA (5EA/BX )

## (undated) DEVICE — SPIDER SHOULDER HOLDER (12EA/BX)

## (undated) DEVICE — CHLORAPREP 26 ML APPLICATOR - ORANGE TINT(25/CA)

## (undated) DEVICE — GLOVE BIOGEL SZ 7 SURGICAL PF LTX - (50PR/BX 4BX/CA)

## (undated) DEVICE — SUTURE 3-0 VICRYL PLUS SH - 8X 18 INCH (12/BX)

## (undated) DEVICE — SUTURE 1 PDS PLUS CT-1 36IN (36EA/BX)

## (undated) DEVICE — DRAPE U SPLIT IMP 54 X 76 - (24/CA)

## (undated) DEVICE — SEALER VESSEL EXTEND FROM DAVINCI ENERGY (6EA/BX)

## (undated) DEVICE — NEEDLE SAFETY 18 GA X 1 1/2 IN (100EA/BX)

## (undated) DEVICE — PACK SHOULDER ARTHROSCOPY SM - (2EA/CA)

## (undated) DEVICE — PACK DAVINCI LOW ANTERIOR RESECTION (1EA/CA)

## (undated) DEVICE — STAPLER 60MM BLUE 3.5MM WITH - STAPLE (3EA/BX)

## (undated) DEVICE — STAPLER SUREFORM 60 12 MM (6EA/BX)

## (undated) DEVICE — ABLATOR WAND SERFAS 90-S CRUISE

## (undated) DEVICE — TROCAR 5X100 NON BLADED Z-TH - READ KII (6/BX)

## (undated) DEVICE — PACK TRENGUARD 450 PROCEDURE (12EA/CA)

## (undated) DEVICE — KIT CUSTOM PROCEDURE SINGLE FOR ENDO (15/CA)

## (undated) DEVICE — SUTURE NONABSORBABLE XBRAID #2 26MM (12EA/BX)

## (undated) DEVICE — PAD OR TABLE DA VINCI 2IN X 20IN X 72IN - (12EA/CA)

## (undated) DEVICE — SET LEADWIRE 5 LEAD BEDSIDE DISPOSABLE ECG (1SET OF 5/EA)

## (undated) DEVICE — SUTURE 0 LIGATING REEL VICRYL PLUS (12PK/BX)

## (undated) DEVICE — DRAPE IOBAN II INCISE 23X17 - (10EA/BX 4BX/CA)

## (undated) DEVICE — SPONGE GAUZESTER 4 X 4 4PLY - (128PK/CA)

## (undated) DEVICE — GLOVE BIOGEL PI INDICATOR SZ 6.5 SURGICAL PF LF - (50/BX 4BX/CA)

## (undated) DEVICE — DRAPETIBURON SHOULDER W/POUCH - (5EA/CA)

## (undated) DEVICE — DRAPESURG STERI-DRAPE LONG - (10/BX 4BX/CA)

## (undated) DEVICE — GRASPER SMALL DA VINCI 10X'S REUSABLE

## (undated) DEVICE — COVER TIP ENDOWRIST HOT SHEAR - (10EA/BX) DA VINCI

## (undated) DEVICE — DERMABOND ADVANCED - (12EA/BX)

## (undated) DEVICE — RETRACTOR O C SECTION LRY - (5/BX)

## (undated) DEVICE — GOWN SURGEONS X-LARGE - DISP. (30/CA)

## (undated) DEVICE — BAG SPONGE COUNT 10.25 X 32 - BLUE (250/CA)

## (undated) DEVICE — CANNULA GEMINI PASSPORT 20MM - (5/BX)

## (undated) DEVICE — RESERVOIR SUCTION 100 CC - SILICONE (20EA/CA)

## (undated) DEVICE — BIPOLAR FORCE DA VINCI 12X'S REISABLE

## (undated) DEVICE — COVER LIGHT HANDLE ALC PLUS DISP (18EA/BX)

## (undated) DEVICE — ELECTRODE DUAL RETURN W/ CORD - (50/PK)

## (undated) DEVICE — DRAPE ARM BOX OF 20

## (undated) DEVICE — TUBE CONNECT SUCTION CLEAR 120 X 1/4" (50EA/CA)"

## (undated) DEVICE — SUTURE 3-0 ETHILON FS-1 - (36/BX) 30 INCH

## (undated) DEVICE — GLOVE SZ 7.5 LF PROTEXIS (50PR/BX)

## (undated) DEVICE — GLOVE SZ 6 BIOGEL PI MICRO - PF LF (50PR/BX 4BX/CA)

## (undated) DEVICE — SHAVER4.0 AGGRESSIVE + FORMLA (5EA/BX)

## (undated) DEVICE — SET EXTENSION WITH 2 PORTS (48EA/CA) ***PART #2C8610 IS A SUBSTITUTE*****

## (undated) DEVICE — TUBING DAY USE W/CARTRIDGE (10EA/BX)

## (undated) DEVICE — TRAY CATHETER FOLEY URINE METER W/STATLOCK 350ML (10EA/CA)

## (undated) DEVICE — TOWELS CLOTH SURGICAL - (4/PK 20PK/CA)

## (undated) DEVICE — SODIUM CHL IRRIGATION 0.9% 1000ML (12EA/CA)

## (undated) DEVICE — KIT ENDOSCOPIC LIGHT SIGMOIDOSCOPE WITH OBTURATOR SUCTION INSTRUMENT AND TUBING SET 8FR (10EA/CA)

## (undated) DEVICE — GOWN SURGEONS LARGE - (32/CA)

## (undated) DEVICE — LACTATED RINGERS INJ 1000 ML - (14EA/CA 60CA/PF)

## (undated) DEVICE — SLEEVE VASO DVT COMPRESSION CALF MED - (10PR/CA)

## (undated) DEVICE — SHEARS MONOPOLAR CURVED DA VINCI 10X'S REUSABLE

## (undated) DEVICE — DRAPE COLUMN BOX OF 20

## (undated) DEVICE — SUTURE 3-0 PDS II PLUS SH 27 IN (36EA/BX)

## (undated) DEVICE — TUBING CASSETTE CROSSFLOW INTEGRATED (10EA/CA)

## (undated) DEVICE — TOWEL STOP TIMEOUT SAFETY FLAG (40EA/CA)

## (undated) DEVICE — SUTURE 1 PDS-2 PLUS CTX - (24/BX)

## (undated) DEVICE — SUTURE 2-0 ETHILON FS - (36/BX) 18 INCH